# Patient Record
Sex: FEMALE | Race: WHITE | NOT HISPANIC OR LATINO | Employment: OTHER | URBAN - METROPOLITAN AREA
[De-identification: names, ages, dates, MRNs, and addresses within clinical notes are randomized per-mention and may not be internally consistent; named-entity substitution may affect disease eponyms.]

---

## 2017-02-12 ENCOUNTER — APPOINTMENT (OUTPATIENT)
Dept: PREADMISSION TESTING | Facility: HOSPITAL | Age: 67
End: 2017-02-12

## 2017-02-12 ENCOUNTER — HOSPITAL ENCOUNTER (OUTPATIENT)
Dept: GENERAL RADIOLOGY | Facility: HOSPITAL | Age: 67
Discharge: HOME | End: 2017-02-12
Admitting: ORTHOPAEDIC SURGERY

## 2017-02-12 VITALS — HEIGHT: 64 IN | WEIGHT: 195.99 LBS | BODY MASS INDEX: 33.46 KG/M2

## 2017-02-12 LAB
ANION GAP SERPL CALCULATED.3IONS-SCNC: 4 MMOL/L (ref 3–11)
BUN BLD-MCNC: 8 MG/DL (ref 9–23)
BUN/CREAT SERPL: 8 (ref 7–25)
CALCIUM SPEC-SCNC: 9.5 MG/DL (ref 8.7–10.4)
CHLORIDE SERPL-SCNC: 101 MMOL/L (ref 99–109)
CO2 SERPL-SCNC: 33 MMOL/L (ref 20–31)
CREAT BLD-MCNC: 1 MG/DL (ref 0.6–1.3)
DEPRECATED RDW RBC AUTO: 46.6 FL (ref 37–54)
ERYTHROCYTE [DISTWIDTH] IN BLOOD BY AUTOMATED COUNT: 13.5 % (ref 11.3–14.5)
GFR SERPL CREATININE-BSD FRML MDRD: 55 ML/MIN/1.73
GLUCOSE BLD-MCNC: 85 MG/DL (ref 70–100)
HBA1C MFR BLD: 5.8 % (ref 4.8–5.6)
HCT VFR BLD AUTO: 34.4 % (ref 34.5–44)
HGB BLD-MCNC: 11.6 G/DL (ref 11.5–15.5)
MCH RBC QN AUTO: 31.4 PG (ref 27–31)
MCHC RBC AUTO-ENTMCNC: 33.7 G/DL (ref 32–36)
MCV RBC AUTO: 93.2 FL (ref 80–99)
PLATELET # BLD AUTO: 319 10*3/MM3 (ref 150–450)
PMV BLD AUTO: 8.3 FL (ref 6–12)
POTASSIUM BLD-SCNC: 3.8 MMOL/L (ref 3.5–5.5)
RBC # BLD AUTO: 3.69 10*6/MM3 (ref 3.89–5.14)
SODIUM BLD-SCNC: 138 MMOL/L (ref 132–146)
WBC NRBC COR # BLD: 5.88 10*3/MM3 (ref 3.5–10.8)

## 2017-02-12 PROCEDURE — 93010 ELECTROCARDIOGRAM REPORT: CPT | Performed by: INTERNAL MEDICINE

## 2017-02-12 RX ORDER — INDAPAMIDE 1.25 MG/1
1.25 TABLET, FILM COATED ORAL EVERY MORNING
Status: ON HOLD | COMMUNITY
End: 2021-10-14

## 2017-02-12 RX ORDER — DIPHENOXYLATE HYDROCHLORIDE AND ATROPINE SULFATE 2.5; .025 MG/1; MG/1
1 TABLET ORAL 4 TIMES DAILY PRN
COMMUNITY

## 2017-02-12 RX ORDER — RAMELTEON 8 MG/1
8 TABLET ORAL NIGHTLY
Status: ON HOLD | COMMUNITY
End: 2021-10-14

## 2017-02-12 RX ORDER — FESOTERODINE FUMARATE 8 MG/1
8 TABLET, EXTENDED RELEASE ORAL
COMMUNITY

## 2017-02-12 RX ORDER — PREGABALIN 150 MG/1
300 CAPSULE ORAL EVERY MORNING
Status: ON HOLD | COMMUNITY
End: 2022-04-11 | Stop reason: SDUPTHER

## 2017-02-12 RX ORDER — POTASSIUM CHLORIDE 750 MG/1
10 TABLET, FILM COATED, EXTENDED RELEASE ORAL 2 TIMES DAILY
COMMUNITY
End: 2021-09-27

## 2017-02-12 RX ORDER — LISINOPRIL 20 MG/1
20 TABLET ORAL 2 TIMES DAILY
COMMUNITY

## 2017-02-12 RX ORDER — AMLODIPINE BESYLATE 5 MG/1
5 TABLET ORAL DAILY
COMMUNITY
End: 2018-01-09 | Stop reason: ALTCHOICE

## 2017-02-12 RX ORDER — HYDROCODONE BITARTRATE AND ACETAMINOPHEN 5; 300 MG/1; MG/1
1 TABLET ORAL EVERY 6 HOURS PRN
COMMUNITY
End: 2017-02-14 | Stop reason: HOSPADM

## 2017-02-12 RX ORDER — ZOLMITRIPTAN 5 MG/1
5 TABLET, FILM COATED ORAL ONCE AS NEEDED
Status: ON HOLD | COMMUNITY
End: 2022-10-06

## 2017-02-12 RX ORDER — VILAZODONE HYDROCHLORIDE 40 MG/1
40 TABLET ORAL DAILY
COMMUNITY

## 2017-02-12 RX ORDER — CHLORDIAZEPOXIDE HYDROCHLORIDE 10 MG/1
10 CAPSULE, GELATIN COATED ORAL 3 TIMES DAILY PRN
Status: ON HOLD | COMMUNITY
End: 2021-10-14

## 2017-02-13 ENCOUNTER — ANESTHESIA (OUTPATIENT)
Dept: PERIOP | Facility: HOSPITAL | Age: 67
End: 2017-02-13

## 2017-02-13 ENCOUNTER — HOSPITAL ENCOUNTER (INPATIENT)
Facility: HOSPITAL | Age: 67
LOS: 1 days | Discharge: HOME HEALTH | End: 2017-02-14
Attending: ORTHOPAEDIC SURGERY | Admitting: ORTHOPAEDIC SURGERY

## 2017-02-13 ENCOUNTER — ANESTHESIA EVENT (OUTPATIENT)
Dept: PERIOP | Facility: HOSPITAL | Age: 67
End: 2017-02-13

## 2017-02-13 ENCOUNTER — APPOINTMENT (OUTPATIENT)
Dept: GENERAL RADIOLOGY | Facility: HOSPITAL | Age: 67
End: 2017-02-13

## 2017-02-13 DIAGNOSIS — Z96.612 STATUS POST TOTAL REPLACEMENT OF LEFT SHOULDER: Primary | ICD-10-CM

## 2017-02-13 PROBLEM — I10 HYPERTENSION: Chronic | Status: ACTIVE | Noted: 2017-02-13

## 2017-02-13 PROBLEM — M19.019 OSTEOARTHROSIS, LOCALIZED, PRIMARY, SHOULDER REGION: Status: ACTIVE | Noted: 2017-02-13

## 2017-02-13 PROBLEM — F32.A DEPRESSED: Chronic | Status: ACTIVE | Noted: 2017-02-13

## 2017-02-13 PROCEDURE — 25010000002 VANCOMYCIN HCL IN NACL 1.25-0.9 GM/250ML-% SOLUTION: Performed by: ORTHOPAEDIC SURGERY

## 2017-02-13 PROCEDURE — 25010000002 NEOSTIGMINE PER 0.5 MG: Performed by: NURSE ANESTHETIST, CERTIFIED REGISTERED

## 2017-02-13 PROCEDURE — 25010000002 MIDAZOLAM PER 1 MG: Performed by: ANESTHESIOLOGY

## 2017-02-13 PROCEDURE — 0LS40ZZ REPOSITION LEFT UPPER ARM TENDON, OPEN APPROACH: ICD-10-PCS | Performed by: ORTHOPAEDIC SURGERY

## 2017-02-13 PROCEDURE — C1713 ANCHOR/SCREW BN/BN,TIS/BN: HCPCS | Performed by: ORTHOPAEDIC SURGERY

## 2017-02-13 PROCEDURE — 25010000002 DEXAMETHASONE PER 1 MG: Performed by: NURSE ANESTHETIST, CERTIFIED REGISTERED

## 2017-02-13 PROCEDURE — 73030 X-RAY EXAM OF SHOULDER: CPT

## 2017-02-13 PROCEDURE — 25010000002 HYDROMORPHONE PER 4 MG: Performed by: ORTHOPAEDIC SURGERY

## 2017-02-13 PROCEDURE — 0RRK0JZ REPLACEMENT OF LEFT SHOULDER JOINT WITH SYNTHETIC SUBSTITUTE, OPEN APPROACH: ICD-10-PCS | Performed by: ORTHOPAEDIC SURGERY

## 2017-02-13 PROCEDURE — 97166 OT EVAL MOD COMPLEX 45 MIN: CPT

## 2017-02-13 PROCEDURE — 25010000002 FENTANYL CITRATE (PF) 100 MCG/2ML SOLUTION: Performed by: NURSE ANESTHETIST, CERTIFIED REGISTERED

## 2017-02-13 PROCEDURE — 94799 UNLISTED PULMONARY SVC/PX: CPT

## 2017-02-13 PROCEDURE — 99221 1ST HOSP IP/OBS SF/LOW 40: CPT | Performed by: NURSE PRACTITIONER

## 2017-02-13 PROCEDURE — G0378 HOSPITAL OBSERVATION PER HR: HCPCS

## 2017-02-13 PROCEDURE — 25010000002 ONDANSETRON PER 1 MG: Performed by: NURSE ANESTHETIST, CERTIFIED REGISTERED

## 2017-02-13 PROCEDURE — C1776 JOINT DEVICE (IMPLANTABLE): HCPCS | Performed by: ORTHOPAEDIC SURGERY

## 2017-02-13 PROCEDURE — 97530 THERAPEUTIC ACTIVITIES: CPT

## 2017-02-13 PROCEDURE — 25010000002 PHENYLEPHRINE PER 1 ML: Performed by: NURSE ANESTHETIST, CERTIFIED REGISTERED

## 2017-02-13 PROCEDURE — 25010000003 CEFAZOLIN IN DEXTROSE 2-4 GM/100ML-% SOLUTION: Performed by: ORTHOPAEDIC SURGERY

## 2017-02-13 PROCEDURE — 25010000002 PROPOFOL 10 MG/ML EMULSION: Performed by: NURSE ANESTHETIST, CERTIFIED REGISTERED

## 2017-02-13 PROCEDURE — G8988 SELF CARE GOAL STATUS: HCPCS | Performed by: OCCUPATIONAL THERAPIST

## 2017-02-13 PROCEDURE — 25010000002 ROPIVACAINE PER 1 MG: Performed by: NURSE ANESTHETIST, CERTIFIED REGISTERED

## 2017-02-13 PROCEDURE — G8987 SELF CARE CURRENT STATUS: HCPCS | Performed by: OCCUPATIONAL THERAPIST

## 2017-02-13 DEVICE — TOTL SHLDR ARTHROPLASTY SYS: Type: IMPLANTABLE DEVICE | Site: SHOULDER | Status: FUNCTIONAL

## 2017-02-13 DEVICE — IMPLANTABLE DEVICE: Type: IMPLANTABLE DEVICE | Site: SHOULDER | Status: FUNCTIONAL

## 2017-02-13 DEVICE — HD HUM EQUINOXE SHT 44MM: Type: IMPLANTABLE DEVICE | Site: SHOULDER | Status: FUNCTIONAL

## 2017-02-13 DEVICE — SMARTSET HIGH PERFORMANCE MV MEDIUM VISCOSITY BONE CEMENT 40G
Type: IMPLANTABLE DEVICE | Site: SHOULDER | Status: FUNCTIONAL
Brand: SMARTSET

## 2017-02-13 DEVICE — STEM HUM PRI EQUINOXE PRESSFIT 13MM: Type: IMPLANTABLE DEVICE | Site: SHOULDER | Status: FUNCTIONAL

## 2017-02-13 DEVICE — PLT HUM EQUINOXE REPLICAT OFFST 1.5: Type: IMPLANTABLE DEVICE | Site: SHOULDER | Status: FUNCTIONAL

## 2017-02-13 DEVICE — SCRW EQUINOXE TORQ DEFINE KT SQ: Type: IMPLANTABLE DEVICE | Site: SHOULDER | Status: FUNCTIONAL

## 2017-02-13 RX ORDER — SODIUM CHLORIDE 450 MG/100ML
50 INJECTION, SOLUTION INTRAVENOUS CONTINUOUS
Status: DISCONTINUED | OUTPATIENT
Start: 2017-02-13 | End: 2017-02-14 | Stop reason: HOSPADM

## 2017-02-13 RX ORDER — VANCOMYCIN HYDROCHLORIDE
15 ONCE
Status: COMPLETED | OUTPATIENT
Start: 2017-02-13 | End: 2017-02-13

## 2017-02-13 RX ORDER — ATRACURIUM BESYLATE 10 MG/ML
INJECTION, SOLUTION INTRAVENOUS AS NEEDED
Status: DISCONTINUED | OUTPATIENT
Start: 2017-02-13 | End: 2017-02-13 | Stop reason: SURG

## 2017-02-13 RX ORDER — DEXAMETHASONE SODIUM PHOSPHATE 4 MG/ML
INJECTION, SOLUTION INTRA-ARTICULAR; INTRALESIONAL; INTRAMUSCULAR; INTRAVENOUS; SOFT TISSUE AS NEEDED
Status: DISCONTINUED | OUTPATIENT
Start: 2017-02-13 | End: 2017-02-13 | Stop reason: SURG

## 2017-02-13 RX ORDER — LIDOCAINE HYDROCHLORIDE 10 MG/ML
INJECTION, SOLUTION INFILTRATION; PERINEURAL AS NEEDED
Status: DISCONTINUED | OUTPATIENT
Start: 2017-02-13 | End: 2017-02-13 | Stop reason: SURG

## 2017-02-13 RX ORDER — SODIUM CHLORIDE 0.9 % (FLUSH) 0.9 %
1-10 SYRINGE (ML) INJECTION AS NEEDED
Status: DISCONTINUED | OUTPATIENT
Start: 2017-02-13 | End: 2017-02-13 | Stop reason: HOSPADM

## 2017-02-13 RX ORDER — MAGNESIUM HYDROXIDE 1200 MG/15ML
LIQUID ORAL AS NEEDED
Status: DISCONTINUED | OUTPATIENT
Start: 2017-02-13 | End: 2017-02-13 | Stop reason: HOSPADM

## 2017-02-13 RX ORDER — NALOXONE HCL 0.4 MG/ML
0.1 VIAL (ML) INJECTION
Status: DISCONTINUED | OUTPATIENT
Start: 2017-02-13 | End: 2017-02-14 | Stop reason: HOSPADM

## 2017-02-13 RX ORDER — PROMETHAZINE HYDROCHLORIDE 25 MG/ML
6.25 INJECTION, SOLUTION INTRAMUSCULAR; INTRAVENOUS ONCE AS NEEDED
Status: DISCONTINUED | OUTPATIENT
Start: 2017-02-13 | End: 2017-02-13 | Stop reason: HOSPADM

## 2017-02-13 RX ORDER — SODIUM CHLORIDE, SODIUM LACTATE, POTASSIUM CHLORIDE, CALCIUM CHLORIDE 600; 310; 30; 20 MG/100ML; MG/100ML; MG/100ML; MG/100ML
9 INJECTION, SOLUTION INTRAVENOUS CONTINUOUS PRN
Status: DISCONTINUED | OUTPATIENT
Start: 2017-02-13 | End: 2017-02-13 | Stop reason: HOSPADM

## 2017-02-13 RX ORDER — ACETAMINOPHEN 325 MG/1
650 TABLET ORAL ONCE AS NEEDED
Status: COMPLETED | OUTPATIENT
Start: 2017-02-13 | End: 2017-02-13

## 2017-02-13 RX ORDER — PROPOFOL 10 MG/ML
VIAL (ML) INTRAVENOUS CONTINUOUS PRN
Status: DISCONTINUED | OUTPATIENT
Start: 2017-02-13 | End: 2017-02-13 | Stop reason: SURG

## 2017-02-13 RX ORDER — ESMOLOL HYDROCHLORIDE 10 MG/ML
INJECTION INTRAVENOUS AS NEEDED
Status: DISCONTINUED | OUTPATIENT
Start: 2017-02-13 | End: 2017-02-13 | Stop reason: SURG

## 2017-02-13 RX ORDER — ONDANSETRON 2 MG/ML
INJECTION INTRAMUSCULAR; INTRAVENOUS AS NEEDED
Status: DISCONTINUED | OUTPATIENT
Start: 2017-02-13 | End: 2017-02-13 | Stop reason: SURG

## 2017-02-13 RX ORDER — ROPIVACAINE HYDROCHLORIDE 2 MG/ML
4 INJECTION, SOLUTION EPIDURAL; INFILTRATION CONTINUOUS
Status: DISCONTINUED | OUTPATIENT
Start: 2017-02-13 | End: 2017-02-13

## 2017-02-13 RX ORDER — SENNA AND DOCUSATE SODIUM 50; 8.6 MG/1; MG/1
2 TABLET, FILM COATED ORAL 2 TIMES DAILY
Status: DISCONTINUED | OUTPATIENT
Start: 2017-02-13 | End: 2017-02-14 | Stop reason: HOSPADM

## 2017-02-13 RX ORDER — PREGABALIN 75 MG/1
75 CAPSULE ORAL 2 TIMES DAILY
Status: DISCONTINUED | OUTPATIENT
Start: 2017-02-13 | End: 2017-02-14 | Stop reason: HOSPADM

## 2017-02-13 RX ORDER — FAMOTIDINE 10 MG/ML
20 INJECTION, SOLUTION INTRAVENOUS
Status: DISCONTINUED | OUTPATIENT
Start: 2017-02-13 | End: 2017-02-13 | Stop reason: HOSPADM

## 2017-02-13 RX ORDER — CHLORDIAZEPOXIDE HYDROCHLORIDE 5 MG/1
10 CAPSULE, GELATIN COATED ORAL 3 TIMES DAILY PRN
Status: DISCONTINUED | OUTPATIENT
Start: 2017-02-13 | End: 2017-02-14 | Stop reason: HOSPADM

## 2017-02-13 RX ORDER — VANCOMYCIN HYDROCHLORIDE
15 ONCE
Status: COMPLETED | OUTPATIENT
Start: 2017-02-13 | End: 2017-02-14

## 2017-02-13 RX ORDER — OXYBUTYNIN CHLORIDE 5 MG/1
10 TABLET, EXTENDED RELEASE ORAL DAILY
Status: DISCONTINUED | OUTPATIENT
Start: 2017-02-14 | End: 2017-02-14 | Stop reason: HOSPADM

## 2017-02-13 RX ORDER — PROPOFOL 10 MG/ML
VIAL (ML) INTRAVENOUS AS NEEDED
Status: DISCONTINUED | OUTPATIENT
Start: 2017-02-13 | End: 2017-02-13 | Stop reason: SURG

## 2017-02-13 RX ORDER — ACETAMINOPHEN 650 MG/1
650 SUPPOSITORY RECTAL ONCE AS NEEDED
Status: COMPLETED | OUTPATIENT
Start: 2017-02-13 | End: 2017-02-13

## 2017-02-13 RX ORDER — HYDROCODONE BITARTRATE AND ACETAMINOPHEN 10; 325 MG/1; MG/1
1 TABLET ORAL EVERY 4 HOURS PRN
Status: DISCONTINUED | OUTPATIENT
Start: 2017-02-13 | End: 2017-02-14 | Stop reason: HOSPADM

## 2017-02-13 RX ORDER — OXYCODONE HYDROCHLORIDE AND ACETAMINOPHEN 5; 325 MG/1; MG/1
1 TABLET ORAL ONCE AS NEEDED
Status: COMPLETED | OUTPATIENT
Start: 2017-02-13 | End: 2017-02-13

## 2017-02-13 RX ORDER — CEFAZOLIN SODIUM 2 G/100ML
2 INJECTION, SOLUTION INTRAVENOUS ONCE
Status: COMPLETED | OUTPATIENT
Start: 2017-02-13 | End: 2017-02-13

## 2017-02-13 RX ORDER — PROMETHAZINE HYDROCHLORIDE 25 MG/1
25 SUPPOSITORY RECTAL ONCE AS NEEDED
Status: DISCONTINUED | OUTPATIENT
Start: 2017-02-13 | End: 2017-02-13 | Stop reason: HOSPADM

## 2017-02-13 RX ORDER — LIDOCAINE HYDROCHLORIDE 10 MG/ML
0.5 INJECTION, SOLUTION EPIDURAL; INFILTRATION; INTRACAUDAL; PERINEURAL ONCE AS NEEDED
Status: COMPLETED | OUTPATIENT
Start: 2017-02-13 | End: 2017-02-13

## 2017-02-13 RX ORDER — MIDAZOLAM HYDROCHLORIDE 1 MG/ML
INJECTION INTRAMUSCULAR; INTRAVENOUS AS NEEDED
Status: DISCONTINUED | OUTPATIENT
Start: 2017-02-13 | End: 2017-02-13 | Stop reason: SURG

## 2017-02-13 RX ORDER — ROPIVACAINE HYDROCHLORIDE 2 MG/ML
6 INJECTION, SOLUTION EPIDURAL; INFILTRATION CONTINUOUS
Status: DISCONTINUED | OUTPATIENT
Start: 2017-02-13 | End: 2017-02-14 | Stop reason: HOSPADM

## 2017-02-13 RX ORDER — FENTANYL CITRATE 50 UG/ML
50 INJECTION, SOLUTION INTRAMUSCULAR; INTRAVENOUS
Status: DISCONTINUED | OUTPATIENT
Start: 2017-02-13 | End: 2017-02-13 | Stop reason: HOSPADM

## 2017-02-13 RX ORDER — BUPIVACAINE HYDROCHLORIDE 2.5 MG/ML
INJECTION, SOLUTION EPIDURAL; INFILTRATION; INTRACAUDAL AS NEEDED
Status: DISCONTINUED | OUTPATIENT
Start: 2017-02-13 | End: 2017-02-13 | Stop reason: SURG

## 2017-02-13 RX ORDER — DOCUSATE SODIUM 100 MG/1
100 CAPSULE, LIQUID FILLED ORAL 2 TIMES DAILY PRN
Status: DISCONTINUED | OUTPATIENT
Start: 2017-02-13 | End: 2017-02-14 | Stop reason: HOSPADM

## 2017-02-13 RX ORDER — OXYCODONE HYDROCHLORIDE AND ACETAMINOPHEN 5; 325 MG/1; MG/1
1 TABLET ORAL EVERY 4 HOURS PRN
Status: DISCONTINUED | OUTPATIENT
Start: 2017-02-13 | End: 2017-02-14 | Stop reason: HOSPADM

## 2017-02-13 RX ORDER — FAMOTIDINE 20 MG/1
20 TABLET, FILM COATED ORAL
Status: DISCONTINUED | OUTPATIENT
Start: 2017-02-13 | End: 2017-02-13 | Stop reason: HOSPADM

## 2017-02-13 RX ORDER — PROMETHAZINE HYDROCHLORIDE 25 MG/1
25 TABLET ORAL ONCE AS NEEDED
Status: DISCONTINUED | OUTPATIENT
Start: 2017-02-13 | End: 2017-02-13 | Stop reason: HOSPADM

## 2017-02-13 RX ORDER — GLYCOPYRROLATE 0.2 MG/ML
INJECTION INTRAMUSCULAR; INTRAVENOUS AS NEEDED
Status: DISCONTINUED | OUTPATIENT
Start: 2017-02-13 | End: 2017-02-13 | Stop reason: SURG

## 2017-02-13 RX ADMIN — OXYCODONE AND ACETAMINOPHEN 1 TABLET: 5; 325 TABLET ORAL at 21:40

## 2017-02-13 RX ADMIN — PHENYLEPHRINE HYDROCHLORIDE 100 MCG: 10 INJECTION INTRAVENOUS at 11:15

## 2017-02-13 RX ADMIN — SODIUM CHLORIDE 50 ML/HR: 4.5 INJECTION, SOLUTION INTRAVENOUS at 14:15

## 2017-02-13 RX ADMIN — Medication 3 MG: at 11:51

## 2017-02-13 RX ADMIN — LIDOCAINE HYDROCHLORIDE 50 MG: 10 INJECTION, SOLUTION INFILTRATION; PERINEURAL at 10:28

## 2017-02-13 RX ADMIN — OXYCODONE AND ACETAMINOPHEN 1 TABLET: 5; 325 TABLET ORAL at 12:50

## 2017-02-13 RX ADMIN — EPHEDRINE SULFATE 10 MG: 50 INJECTION INTRAMUSCULAR; INTRAVENOUS; SUBCUTANEOUS at 11:15

## 2017-02-13 RX ADMIN — EPHEDRINE SULFATE 10 MG: 50 INJECTION INTRAMUSCULAR; INTRAVENOUS; SUBCUTANEOUS at 11:10

## 2017-02-13 RX ADMIN — PREGABALIN 75 MG: 75 CAPSULE ORAL at 17:32

## 2017-02-13 RX ADMIN — EPHEDRINE SULFATE 10 MG: 50 INJECTION INTRAMUSCULAR; INTRAVENOUS; SUBCUTANEOUS at 10:35

## 2017-02-13 RX ADMIN — CEFAZOLIN SODIUM 2 G: 2 INJECTION, SOLUTION INTRAVENOUS at 09:25

## 2017-02-13 RX ADMIN — DOCUSATE SODIUM AND SENNOSIDES 2 TABLET: 8.6; 5 TABLET, FILM COATED ORAL at 17:31

## 2017-02-13 RX ADMIN — DEXAMETHASONE SODIUM PHOSPHATE 8 MG: 4 INJECTION, SOLUTION INTRAMUSCULAR; INTRAVENOUS at 10:28

## 2017-02-13 RX ADMIN — LIDOCAINE HYDROCHLORIDE 0.2 ML: 10 INJECTION, SOLUTION EPIDURAL; INFILTRATION; INTRACAUDAL; PERINEURAL at 08:01

## 2017-02-13 RX ADMIN — ESMOLOL HYDROCHLORIDE 30 MG: 10 INJECTION, SOLUTION INTRAVENOUS at 10:28

## 2017-02-13 RX ADMIN — HYDROMORPHONE HYDROCHLORIDE 0.5 MG: 1 INJECTION, SOLUTION INTRAMUSCULAR; INTRAVENOUS; SUBCUTANEOUS at 14:33

## 2017-02-13 RX ADMIN — ACETAMINOPHEN 650 MG: 325 TABLET ORAL at 12:50

## 2017-02-13 RX ADMIN — PROPOFOL 25 MCG/KG/MIN: 10 INJECTION, EMULSION INTRAVENOUS at 10:28

## 2017-02-13 RX ADMIN — SODIUM CHLORIDE, POTASSIUM CHLORIDE, SODIUM LACTATE AND CALCIUM CHLORIDE 9 ML/HR: 600; 310; 30; 20 INJECTION, SOLUTION INTRAVENOUS at 08:01

## 2017-02-13 RX ADMIN — BUPIVACAINE HYDROCHLORIDE 20 ML: 2.5 INJECTION, SOLUTION EPIDURAL; INFILTRATION; INTRACAUDAL; PERINEURAL at 08:30

## 2017-02-13 RX ADMIN — FENTANYL CITRATE 50 MCG: 50 INJECTION, SOLUTION INTRAMUSCULAR; INTRAVENOUS at 12:20

## 2017-02-13 RX ADMIN — VANCOMYCIN HYDROCHLORIDE 1250 MG: 1 INJECTION, POWDER, LYOPHILIZED, FOR SOLUTION INTRAVENOUS at 23:49

## 2017-02-13 RX ADMIN — OXYCODONE AND ACETAMINOPHEN 1 TABLET: 5; 325 TABLET ORAL at 17:32

## 2017-02-13 RX ADMIN — EPHEDRINE SULFATE 10 MG: 50 INJECTION INTRAMUSCULAR; INTRAVENOUS; SUBCUTANEOUS at 10:40

## 2017-02-13 RX ADMIN — HYDROMORPHONE HYDROCHLORIDE 0.5 MG: 1 INJECTION, SOLUTION INTRAMUSCULAR; INTRAVENOUS; SUBCUTANEOUS at 23:48

## 2017-02-13 RX ADMIN — Medication 6 ML/HR: at 12:12

## 2017-02-13 RX ADMIN — VANCOMYCIN HYDROCHLORIDE 1250 MG: 1 INJECTION, POWDER, LYOPHILIZED, FOR SOLUTION INTRAVENOUS at 10:26

## 2017-02-13 RX ADMIN — ROBINUL 0.4 MG: 0.2 INJECTION INTRAMUSCULAR; INTRAVENOUS at 11:51

## 2017-02-13 RX ADMIN — PROPOFOL 150 MG: 10 INJECTION, EMULSION INTRAVENOUS at 10:28

## 2017-02-13 RX ADMIN — HYDROMORPHONE HYDROCHLORIDE 0.5 MG: 1 INJECTION, SOLUTION INTRAMUSCULAR; INTRAVENOUS; SUBCUTANEOUS at 19:44

## 2017-02-13 RX ADMIN — FENTANYL CITRATE 50 MCG: 50 INJECTION, SOLUTION INTRAMUSCULAR; INTRAVENOUS at 12:30

## 2017-02-13 RX ADMIN — ONDANSETRON 4 MG: 2 INJECTION INTRAMUSCULAR; INTRAVENOUS at 11:40

## 2017-02-13 RX ADMIN — ATRACURIUM BESYLATE 30 MG: 10 INJECTION, SOLUTION INTRAVENOUS at 10:28

## 2017-02-13 RX ADMIN — MIDAZOLAM HYDROCHLORIDE 2 MG: 1 INJECTION, SOLUTION INTRAMUSCULAR; INTRAVENOUS at 08:25

## 2017-02-13 RX ADMIN — FAMOTIDINE 20 MG: 20 TABLET ORAL at 08:02

## 2017-02-13 NOTE — NURSING NOTE
Acute Pain Service:  On-Q teaching completed with patient's sister Tiffanie Gonzalez.  Video demonstration, handout and bracelet provided with [facility] on call central phone number.  Instructed to call with any questions or concerns.  Patient verbalized understanding.  Service will continue to follow until catheter DC'd.  Please contact Tiffanie at [phone] if needed.

## 2017-02-13 NOTE — PLAN OF CARE
Problem: Patient Care Overview (Adult)  Goal: Plan of Care Review  Outcome: Ongoing (interventions implemented as appropriate)    02/13/17 3840   Coping/Psychosocial Response Interventions   Plan Of Care Reviewed With patient   Patient Care Overview   Progress improving   Outcome Evaluation   Outcome Summary/Follow up Plan Patient ambulating and in halls with therapy and pain is better.       Goal: Adult Individualization and Mutuality  Outcome: Ongoing (interventions implemented as appropriate)  Goal: Discharge Needs Assessment  Outcome: Ongoing (interventions implemented as appropriate)    Problem: Perioperative Period (Adult)  Goal: Signs and Symptoms of Listed Potential Problems Will be Absent or Manageable (Perioperative Period)  Outcome: Ongoing (interventions implemented as appropriate)    Problem: Fall Risk (Adult)  Goal: Identify Related Risk Factors and Signs and Symptoms  Outcome: Ongoing (interventions implemented as appropriate)  Goal: Absence of Falls  Outcome: Ongoing (interventions implemented as appropriate)

## 2017-02-13 NOTE — ANESTHESIA PREPROCEDURE EVALUATION
Anesthesia Evaluation     Patient summary reviewed and Nursing notes reviewed   history of anesthetic complications: PONV     Airway   Mallampati: III  TM distance: >3 FB  Neck ROM: full  possible difficult intubation  Dental    (+) implants    Pulmonary    (+) sleep apnea, decreased breath sounds,   Cardiovascular - normal exam  Exercise tolerance: good (4-7 METS)    ECG reviewed  Rhythm: regular  Rate: normal    (+) hypertension well controlled,       Neuro/Psych  (+) headaches, psychiatric history Depression,    GI/Hepatic/Renal/Endo    (+)  GERD well controlled,     Musculoskeletal     Abdominal   (+) obese,     Abdomen: soft.   Substance History      OB/GYN          Other   (+) arthritis                             MILD ANEMIA    Anesthesia Plan    ASA 2     general and regional     intravenous induction   Anesthetic plan and risks discussed with patient.    Plan discussed with CRNA.

## 2017-02-13 NOTE — BRIEF OP NOTE
TOTAL SHOULDER REVISION  Procedure Note    Jaylyn Fuentes  2/13/2017    Pre-op Diagnosis:   * No pre-op diagnosis entered *    Post-op Diagnosis:     Post-Op Diagnosis Codes:     * Primary localized osteoarthrosis of shoulder region, left [M19.012]     * Left bicipital tenosynovitis [M75.22]    Procedure/CPT® Codes:  OH RECONSTR TOTAL SHOULDER IMPLANT [80942]  OH REPAIR BICEPS LONG TENDON [67162]    Procedure(s):  TOTAL SHOULDER REPLACEMENT LEFT    Surgeon(s):  MD Freeman Robertson MD, Sports Fellow    Anesthesia: General with Block    Staff:   Circulator: Alexandra Mary RN; Sonia Cordon RN  Scrub Person: Carmine Marley; Rosanne Cerda  Vendor Representative: Luis Sweeney  Nursing Assistant: Elisabeth Washington  Assistant: WAYLON Lancaster    Estimated Blood Loss: 75 mL    Specimens:                * No specimens in log *      Drains:           Findings: per dictation    Complications: none      Benigno Chavez MD     Date: 2/13/2017  Time: 11:58 AM

## 2017-02-13 NOTE — PROGRESS NOTES
Acute Care - Occupational Therapy Initial Evaluation  [facility]     Patient Name: Jaylyn Fuentes  : 1950  MRN: [id number]  Today's Date: 2017  Onset of Illness/Injury or Date of Surgery Date: (P) 17  Date of Referral to OT: (P) 17  Referring Physician: (VANNESA) MD Scott    Admit Date: 2017     No diagnosis found.  Patient Active Problem List   Diagnosis   • Osteoarthrosis, localized, primary, shoulder region   • Hypertension   • Depressed   • Status post reverse total arthroplasty of left shoulder     Past Medical History   Diagnosis Date   • Arthritis    • Depressed    • GERD (gastroesophageal reflux disease)    • History of transfusion    • Hypertension    • Migraine    • PONV (postoperative nausea and vomiting)      SCOPOLAMINE HELPS-USED DURING LAST SURGERY    • Urinary incontinence    • Wears contact lenses    • Wears dentures      UPPER AND LOWER    • Wears hearing aid      DOESN'T HAVE WITH HER     Past Surgical History   Procedure Laterality Date   • Cholecystectomy     • Hysterectomy     • Joint replacement       BILATERAL KNEES, BILATERAL HIPS   • Back surgery       LUMBAR FUSION    • Cervical fusion     • Colonoscopy       2 YEARS AGO    • Shoulder ligament repair Right    • Tonsillectomy     • Eye surgery       LASER FOR KERITONOSIS           OT ASSESSMENT FLOWSHEET (last 72 hours)      OT Evaluation       17 1800 17 1600 17 1531 17 1341 17 0744    Rehab Evaluation    Document Type   (P)  evaluation  -HK      Subjective Information   (P)  agree to therapy;no complaints  -HK      Patient Effort, Rehab Treatment   (P)  good  -HK      Symptoms Noted During/After Treatment   (P)  other (see comments)   lethargic  -HK      General Information    Patient Profile Review   (P)  yes  -HK      Onset of Illness/Injury or Date of Surgery Date   (P)  17  -HK      Referring Physician   (P)  MD Scott  -HK      General Observations   (P)  Pt supine  in bed with hospitalist at bedside.   -HK      Pertinent History Of Current Problem   (P)  Pt is a 66 YOF admitted for surgical management for progressive shoulder pain that failed to improve w/ conservative measures. POD #0 L TSR. OT orders indicate PROM forward elevation no limit, IR to chest, ER 30 degrees and AROM elbow, wrist, hand, and scapular retraction. Prior to surgery pt reports having difficulty driving, drying her hair, and reaching in cabinets.   -HK      Precautions/Limitations   (P)  fall precautions;shoulder precautions;non-weight bearing status;insensate limb   interscalene nerve cath  -HK      Prior Level of Function   (P)  independent:;all household mobility;community mobility;min assist:;ADL's;home management  -HK      Equipment Currently Used at Home   (P)  none  -HK  none  -PC    Plans/Goals Discussed With   (P)  patient and family;agreed upon  -HK      Risks Reviewed   (P)  patient and family:;LOB;nausea/vomiting;dizziness;increased discomfort;change in vital signs  -HK      Benefits Reviewed   (P)  patient and family:;improve function;increase independence;increase strength;increase balance;decrease pain;decrease risk of DVT;increase knowledge  -HK      Barriers to Rehab   (P)  none identified  -HK      Living Environment    Lives With   (P)  alone  -HK  alone  -PC    Living Arrangements   (P)  house  -HK  house  -PC    Home Accessibility   (P)  bed and bath are not on the first floor   Pt reports that kitchen, bathroom, bedroom are on 2nd floor  -HK  no concerns  -PC    Stair Railings at Home   (P)  other (see comments)   pt reports brother is currently installing railings  -HK      Type of Financial/Environmental Concern   (P)  none  -HK  none  -PC    Transportation Available   (P)  car  -HK  car  -PC    Living Environment Comment   (P)  Pt lives alone, sister will be staying with her 1 night to assist. Pt unsure of discharge disposition at present  -HK      Clinical Impression    Date of  Referral to OT   (P)  02/13/17  -HK      OT Diagnosis   (P)  Decreased independence w/ ADLs and mobility.   -HK      Patient/Family Goals Statement   (P)  Patient wants to return home   -HK      Criteria for Skilled Therapeutic Interventions Met   (P)  yes;treatment indicated  -HK      Rehab Potential   (P)  good, to achieve stated therapy goals  -HK      Therapy Frequency   (P)  daily   per prioriy policy  -HK      Anticipated Discharge Disposition   (P)  home with assist   If sister is able to provide ongoing care  -HK      Functional Level Prior    Ambulation     0-->independent  -PC    Transferring     0-->independent  -PC    Toileting     0-->independent  -PC    Bathing     0-->independent  -PC    Dressing     0-->independent  -PC    Eating     0-->independent  -PC    Communication     0-->understands/communicates without difficulty  -PC    Swallowing     0-->swallows foods/liquids without difficulty  -PC    Vital Signs    Pre SpO2 (%)   (P)  94  -HK      O2 Delivery Pre Treatment   (P)  supplemental O2  -HK      Intra SpO2 (%)   (P)  86  -HK      O2 Delivery Intra Treatment   (P)  room air  -HK      Post SpO2 (%)   (P)  94  -HK      O2 Delivery Post Treatment   (P)  supplemental O2  -HK      Pain Assessment    Pain Assessment   (P)  No/denies pain  -HK      Vision Assessment/Intervention    Visual Impairment   (P)  WFL with corrective lenses  -HK      Visual Impairment Comment   (P)  Pt states that she wears contacts   -HK      Cognitive Assessment/Intervention    Current Cognitive/Communication Assessment   (P)  functional  -HK      Orientation Status   (P)  oriented x 4  -HK      Follows Commands/Answers Questions   (P)  able to follow single-step instructions;75% of the time;100% of the time   limited by lethargy  -HK      Personal Safety   (P)  WNL/WFL  -HK      Personal Safety Interventions   (P)  fall prevention program maintained;gait belt;nonskid shoes/slippers when out of bed  -HK      ROM (Range of  Motion)    General ROM   (P)  --  -HK      General ROM Detail   (P)  RUE WFL LUE deferred  -HK      MMT (Manual Muscle Testing)    General MMT Assessment   (P)  --  -HK      General MMT Assessment Detail   (P)  RUE WFL LUE deferred  -HK      Muscle Tone Assessment    LUE Muscle Tone Assessment    moderately decreased tone  -TB     Mobility Assessment/Training    Extremity Weight-Bearing Status   (P)  left upper extremity  -HK      Left Upper Extremity Weight-Bearing   (P)  non weight-bearing  -HK      Bed Mobility, Assessment/Treatment    Bed Mobility, Assistive Device   (P)  overhead trapeze  -HK      Bed Mob, Supine to Sit, [place]   (P)  supervision required  -HK      Bed Mob, Sit to Supine, [place]   (P)  supervision required  -HK      Transfer Assessment/Treatment    Transfers, Sit-Stand [place]   (P)  contact guard assist  -HK      Transfers, Stand-Sit [place]   (P)  contact guard assist  -HK      Transfer, Comment   (P)  Pt was recently medicated with IV pain medication   -HK      Functional Mobility    Functional Mobility- Ind. Level   (P)  contact guard assist  -HK      Functional Mobility-Distance (Feet)   (P)  100  -HK      Upper Body Bathing Assessment/Training    UB Bathing Assess/Train, Comment   (P)  Pt educated on shoulder precautions and left axilla care  -HK      Upper Body Dressing Assessment/Training    UB Dressing Assess/Train, Comment   (P)  Pt. educated on ADL retraining to maintain precautions, sling management including application, wear schedule, and proper fit. Care of ON-Q ball during UB ADLS  -HK      Motor Skills/Interventions    Additional Documentation   (P)  Balance Skills Training (Group)  -HK      Balance Skills Training    Sitting-Level of Assistance   (P)  Close supervision  -HK      Sitting-Balance Support   (P)  Right upper extremity supported;Feet supported  -HK      Standing-Level of Assistance   (P)  Contact guard  -HK      Therapy Exercises    Left Upper  Extremity   (P)  AROM:;10 reps;supine;shoulder protraction/retraction;hand pumps;pronation/supination;AAROM:;elbow flexion/extension;PROM:;shoulder ER/IR;shoulder extension/flexion   issued and reviewed LUE HEP, family not present  -HK      Exercise Protocols   (P)  --   tolerated PROM shoulder , IR to chest ER to 30  -HK      Sensory Assessment/Intervention    Light Touch LUE  -TB LUE  -TB  LUE  -TB     LUE Light Touch mild impairment  -TB mild impairment  -TB  mild impairment  -TB     General Therapy Interventions    ADL Retraining   (P)  Pt educated on shoulder precautions, ADL retraining to maintain, sling management, care of ON-Q ball   -HK      Bed Mobility Training   (P)  educated on maintaining non weight bearing LUE during bed mobility   -HK      Home Exercise Program   (P)  issued and reviewed LUE HEP  -HK      Transfer Training   (P)  reviewed safe transfer training  -HK      Positioning and Restraints    Pre-Treatment Position   (P)  in bed  -HK      Post Treatment Position   (P)  bed  -HK      In Bed   (P)  supine;call light within reach;encouraged to call for assist;exit alarm on;with family/caregiver;SCD pump applied;with brace  -HK        User Key  (r) = Recorded By, (t) = Taken By, (c) = Cosigned By    Initials Name Effective Dates    TB Frandy Donaldson RN 06/16/16 -     PC Jaylyn Caceres RN 06/16/16 -     HK Annalee Collado, OT Student 12/19/16 -            Occupational Therapy Education     Title: PT OT SLP Therapies (Done)     Topic: Occupational Therapy (Done)     Point: ADL training (Done)    Description: Instruct learner(s) on proper safety adaptation and remediation techniques during self care or transfers.   Instruct in proper use of assistive devices.    Learning Progress Summary    Learner Readiness Method Response Comment Documented by Status   Patient Acceptance E,D VU Pt educated on L shoulder precautions, ADL retraining to maintain, sling management, care of ON-Q with ADLS,  NWB LUE, LUE HEP, home safety.  02/13/17 1758 Done               Point: Home exercise program (Done)    Description: Instruct learner(s) on appropriate technique for monitoring, assisting and/or progressing therapeutic exercises/activities.    Learning Progress Summary    Learner Readiness Method Response Comment Documented by Status   Patient Acceptance E,D VU Pt educated on L shoulder precautions, ADL retraining to maintain, sling management, care of ON-Q with ADLS, NWB LUE, LUE HEP, home safety.  02/13/17 1758 Done               Point: Precautions (Done)    Description: Instruct learner(s) on prescribed precautions during self-care and functional transfers.    Learning Progress Summary    Learner Readiness Method Response Comment Documented by Status   Patient Acceptance E,D VU Pt educated on L shoulder precautions, ADL retraining to maintain, sling management, care of ON-Q with ADLS, NWB LUE, LUE HEP, home safety.  02/13/17 1758 Done               Point: Body mechanics (Done)    Description: Instruct learner(s) on proper positioning and spine alignment during self-care, functional mobility activities and/or exercises.    Learning Progress Summary    Learner Readiness Method Response Comment Documented by Status   Patient Acceptance E,D VU Pt educated on L shoulder precautions, ADL retraining to maintain, sling management, care of ON-Q with ADLS, NWB LUE, LUE HEP, home safety.  02/13/17 1758 Done                      User Key     Initials Effective Dates Name Provider Type Discipline     12/19/16 -  Annalee Collado OT Student OT Student OT                  OT Recommendation and Plan  Anticipated Discharge Disposition: (P) home with assist (If sister is able to provide ongoing care)  Therapy Frequency: (P) daily (per prioriy policy)  Plan of Care Review  Plan Of Care Reviewed With: (P) patient, sibling  Progress: (P) improving  Outcome Summary/Follow up Plan: (P) OT evaluation complete and family present  for portion of teaching. Pt had no complaints of pain. She tolerated PROM , IR chest, ER 30. Pt lives alone and is unsure of discharge desire. Due to lethargy from pain meds the pt not candidate for SROM teaching today.           OT Goals       02/13/17 1800          Bed Mobility OT LTG    Bed Mobility OT LTG, Date Established (P)  02/13/17  -HK      Bed Mobility OT LTG, Time to Achieve (P)  by discharge  -HK      Bed Mobility OT LTG, Activity Type (P)  supine to sit/sit to supine  -HK      Bed Mobility OT LTG, [place] Level (P)  supervision required   while maintaining NWB LUE  -HK      Bed Mobility OT LTG, Outcome (P)  goal met  -HK      Transfer Training OT LTG    Transfer Training OT LTG, Date Established (P)  02/13/17  -HK      Transfer Training OT LTG, Time to Achieve (P)  by discharge  -HK      Transfer Training OT LTG, Activity Type (P)  sit to stand/stand to sit  -HK      Transfer Training OT LTG, [place] Level (P)  contact guard assist;verbal cues required  -HK      Transfer Training OT LTG, Outcome (P)  goal met  -HK      Patient Education OT LTG    Patient Education OT LTG, Date Established (P)  02/13/17  -HK      Patient Education OT LTG, Time to Achieve (P)  by discharge  -HK      Patient Education OT LTG, Education Type (P)  HEP;precautions per surgeon;home safety;1 hand/fariba technique;brace use/care;melissa/doff brace  -HK      Patient Education OT LTG, Education Understanding (P)  demonstrates adequately;verbalizes understanding  -HK      UB Dressing OT LTG    UB Dressing Goal OT LTG, Date Established (P)  02/13/17  -HK      UB Dressing Goal OT LTG, Time to Achieve (P)  by discharge  -HK      UB Dressing Goal OT LTG, Activity Type (P)  pt and family will don/doff LUE sling w/ min assist and min cues in prep for safe discharge home   -HK        User Key  (r) = Recorded By, (t) = Taken By, (c) = Cosigned By    Initials Name Provider Type    SHABBIR Collado, OT Student OT Student                 Outcome Measures       02/13/17 1531          How much help from another is currently needed...    Putting on and taking off regular lower body clothing? (P)  2  -HK      Bathing (including washing, rinsing, and drying) (P)  2  -HK      Toileting (which includes using toilet bed pan or urinal) (P)  2  -HK      Putting on and taking off regular upper body clothing (P)  2  -HK      Taking care of personal grooming (such as brushing teeth) (P)  3  -HK      Eating meals (P)  4  -HK      Score (P)  15  -HK      Functional Assessment    Outcome Measure Options (P)  AM-PAC 6 Clicks Daily Activity (OT)  -HK        User Key  (r) = Recorded By, (t) = Taken By, (c) = Cosigned By    Initials Name Provider Type     Annalee Collado OT Student OT Student          Time Calculation:   OT Start Time: (P) 1531    Therapy Charges for Today     Code Description Service Date Service Provider Modifiers Qty    [id number]  OT EVAL MOD COMPLEXITY 4 2/13/2017 Annalee Collado, OT Student GO 1    [id number]  OT THERAPEUTIC ACT EA 15 MIN 2/13/2017 Annalee Collado, OT Student GO 1    [id number]  OT THER SUPP EA 15 MIN 2/13/2017 Annalee Collado, OT Student GO 3          OT G-codes  OT Professional Judgement Used?: (P) Yes  OT Functional Scales Options: (P) AM-PAC 6 Clicks Daily Activity (OT)  Functional Assessment Tool Used: (P) 6 clicks   Score: (P) 15  Functional Limitation: (P) Self care  Self Care Current Status (): (P) At least 40 percent but less than 60 percent impaired, limited or restricted  Self Care Goal Status (): (P) At least 20 percent but less than 40 percent impaired, limited or restricted    Annalee Collado OT Student  2/13/2017

## 2017-02-13 NOTE — ANESTHESIA PROCEDURE NOTES
Airway  Urgency: elective    Airway not difficult    General Information and Staff    Patient location during procedure: OR  CRNA: KETAN ESCOBEDO    Indications and Patient Condition  Indications for airway management: airway protection    Preoxygenated: yes  MILS not maintained throughout  Mask difficulty assessment: 1 - vent by mask    Final Airway Details  Final airway type: endotracheal airway      Successful airway: ETT  Cuffed: yes   Successful intubation technique: direct laryngoscopy  Endotracheal tube insertion site: oral  Blade: Janes  Blade size: #3  ETT size: 6.5 mm  Cormack-Lehane Classification: grade I - full view of glottis  Placement verified by: chest auscultation and capnometry   Cuff volume (mL): 7  Measured from: lips  ETT to lips (cm): 20  Number of attempts at approach: 1    Additional Comments  Negative epigastric sounds, Breath sound equal bilaterally with symmetric chest rise and fall. Atraumatic, no damage to lips or teeth during intubation

## 2017-02-13 NOTE — ANESTHESIA PROCEDURE NOTES
Peripheral Block    Patient location during procedure: pre-op  Start time: 2/13/2017 8:25 AM  Stop time: 2/13/2017 8:33 AM  Reason for block: at surgeon's request and post-op pain management  Performed by  Anesthesiologist: JAMES CARSON  Preanesthetic Checklist  Completed: patient identified, site marked, surgical consent, pre-op evaluation, timeout performed, IV checked, risks and benefits discussed and monitors and equipment checked  Peripheral Block Prep:  Sterile barriers:cap, gloves, mask and sterile barriers  Prep: ChloraPrep  Patient monitoring: blood pressure monitoring, continuous pulse oximetry and EKG  Peripheral Procedure  Sedation:yes  Guidance:ultrasound guided  Images:still images obtained  Laterality:leftBlock Type:interscalene  Injection Technique:catheterNeedle Type:Tuohy  Needle Gauge:18 G  Catheter Size:20 G (20g)  Medications  Preservative Free Saline:10ml  Local Injected:bupivacaine 0.25% without epinephrine Local Amount Injected:20mL  Post Assessment  Patient Tolerance:comfortable throughout block  Complications:no  Additional Notes  Procedure:                Catheter at skin 10 cm                                      Anesthesia was provide by 2mg midazolam      The pt was placed in semifowlers position with a slight tilt of the thorax contralateral to the insertion site.  The Insertion Site was prepped and draped in sterile fashion.  The skin was anesthetized with Lidocaine 1% 1ml injection utilizing a 25g needle.  Utilizing ultrasound guidance, a BBraun 2 inch 18 g Contiplex echogenic touhy needle was advanced in-plane.  Hydro dissection of tissue was achieved with Normal saline. Major vessels(carotid and Internal Jugular) where visualized as the brachial plexus was approached at the approximate level of C-7/ T-1.  Cervical 5 and Branches of Cervical 6 nerve roots where visualized and the needle tip was placed posterior at the level of C-6 roots.  LA spread was visualized and  injection was made incrementally every 5 mls with aspiration. Injection pressure was normal or little, there was no intraneural injection, no vascular injection.      The BBraun 20 g wire stylet  catheter was then placed under US guidance on the posterior aspect of the Brachial Plexus.  Location of catheter was confirmed with NS injection visualized with US . The tuohy was then removed and the skin was sealed with Skin AFix at catheter insertion site.  Skin was prepped with mastisol and the labeled catheter  was secured with steristrips and a CHG tegaderm. Thank You.

## 2017-02-13 NOTE — OP NOTE
DATE OF OPERATION: 02/13/17  PREOPERATIVE DIAGNOSIS: Left shoulder degenerative joint disease with pain and limitation of function and motion.    POSTOPERATIVE DIAGNOSES:  1. Left shoulder degenerative joint disease with pain and limitation of function and motion.    2. Biceps tenosynovitis.    PROCEDURES PERFORMED:  1. Left total shoulder arthroplasty.    2. Left biceps tenodesis.    SURGEON: Benigno Chavez MD  ASSISTANTS:  1. Freeman Ladd MD, Sports Fellow  2. Kaitlynn Altman PA-C, medically necessary.    ANESTHESIA: General plus block.    ESTIMATED BLOOD LOSS:75mL.    COMPLICATIONS: None.    DISPOSITION: Recovery room in stable condition.    IMPLANTS: [employer] Equinoxe total shoulder system, 13 mm stem press-fit, 1.5 replicator plate, 44 short humeral head, and small alpha cage glenoid peripherally cemented.    INDICATIONS: This is a 66-year-old female with left shoulder pain and limited function and motion secondary to DJD. They have failed conservative treatment and after a discussion of risks, benefits, and alternatives, wished to proceed with shoulder arthroplasty.  DESCRIPTION OF PROCEDURE: On the day of surgery, she identified the left shoulder as the correct operative extremity. This was initialed by the surgeon with the patients's acknowledgment. The patient underwent placement of an interscalene block and was taken to the operating room and placed in the supine position. Upon induction of adequate anesthesia, the patient was brought up to the beach chair position and the shoulder and upper extremity were prepped and draped in the usual sterile fashion. Timeout confirmed the correct patient and operative extremity as well as that antibiotics were on board. A standard deltopectoral approach to the shoulder was carried out. It was carried sharply through the skin and subcutaneous tissue. Medial and lateral flaps were developed over the deltopectoral fascia. The cephalic vein was identified and mobilized  medially with the deltoid. The subdeltoid and subpectoral spaces were mobilized and a blunt retractor was placed deep to this. The clavipectoral fascia was opened on the lateral edge of the conjoined tendon and the retractor was moved deep to this. The leading edge of the pectoralis was released exposing the long head of the biceps. This was tenosynovitic. It was tenodesed to the pectoralis and released proximal to this. The clavipectoral fascia was opened on the lateral edge of conjoined tendon and the retractor moved deep to this. The 3 sisters were identified and coagulated. A subscapularis tenotomy was performed 1 cm medial to the lesser tuberosity and rotator interval was released to the glenoid exposing the humeral head. The inferior capsule was released directly off the humerus to allow greater than 90° of external rotation. A large inferior osteophyte was present which was removed with rongeur. The anatomic neck was exposed and the humeral head osteotomy was performed in approximately 30° of retroversion. The remainder of the osteophytes were removed. The canal was then entered, reamed, and broached. The final stem impacted in in approximately 30° of retroversion. A head protector was placed. The humerus was subluxed posteriorly. The glenoid exposed. Circumferential labral excision and capsular release were performed. A 270° mobilization of the subscapularis was carried out as well.  A centering hole was drilled. The glenoid was gently reamed and peripheral holes were drilled and trialing was carried out. The appropriate size was chosen. Thrombin-soaked Gelfoam was inserted into the holes to obtain hemostasis. Once the cement was prepared, Gelfoam was removed. Cement was inserted into the 3 peripheral holes and pressurized twice prior to impaction of the caged glenoid. Excellent fit was achieved with no rocking or instability. No excess cement was identified. The humerus was carefully subluxed back  anteriorly. A 1.5 replicator plate was chosen and trialing was carried out. The appropriate head size and position were chosen and allowed approximately 50% posterior subluxation with spontaneous reduction, 25% inferior subluxation with spontaneous reduction, and full passive range of motion. The joint was copiously irrigated with orthopedic irrigation mixed with Betadine after the final implants were assembled and locked into place. The shoulder was reduced. The subscapularis was meticulously repaired with #2 FiberWire as was the rotator interval. This was stable to approximately 60° of external rotation, but will be limited to 30° in the perioperative period. The deltopectoral interval was approximated with 0 Vicryl, the subcutaneous tissue with 2-0 Vicryl, and the skin with Monocryl and Dermabond. A sterile dressing was placed. Anesthesia was reversed and the patient was taken to the recovery room in stable condition. All instrument, needle, and sponge counts were correct.      Benigno Chavez MD*

## 2017-02-13 NOTE — CONSULTS
Hospital Medicine Consult    Date of Admission: 2/13/2017  Date of Consult: 02/13/17    Primary Care Physician: Annie Romero MD  Referring Physician: Dr Chavez    Chief complaint/Reason for consultation:  Medical management s/p left shoulder arthroplasty    Subjective   History of Present Illness   Patient status post left shoulder arthroplasty this afternoon.  Patient just got to the floor prior to consult.  Patient states that she was having some pain in the axilla area, but got a pain shot and that is resolved.  Patient moving around with very little difficulty.  OT came into the room to do an evaluation.    Review of Systems   Constitutional: Negative for activity change, appetite change, chills, fatigue and fever.   HENT: Negative for congestion, facial swelling, rhinorrhea and sore throat.    Eyes: Negative.    Respiratory: Negative for cough, shortness of breath, wheezing and stridor.    Cardiovascular: Negative for chest pain, palpitations and leg swelling.   Gastrointestinal: Negative for abdominal distention, abdominal pain, constipation, diarrhea, nausea and vomiting.   Genitourinary: Negative for difficulty urinating, dysuria, flank pain, frequency and urgency.   Musculoskeletal: Positive for joint swelling. Negative for back pain, gait problem, myalgias, neck pain and neck stiffness.   Skin: Negative for pallor, rash and wound.   Neurological: Negative for dizziness, tremors, facial asymmetry, speech difficulty, weakness, light-headedness and headaches.   Psychiatric/Behavioral: Negative for agitation, behavioral problems, confusion, sleep disturbance and suicidal ideas. The patient is not nervous/anxious.    Otherwise complete ROS is negative except as mentioned above.    Past Medical History:  has a past medical history of Arthritis; Depressed; GERD (gastroesophageal reflux disease); History of transfusion; Hypertension; Migraine; PONV (postoperative nausea and vomiting); Urinary incontinence;  Wears contact lenses; Wears dentures; and Wears hearing aid.    Past Surgical History:  has a past surgical history that includes Cholecystectomy; Hysterectomy; Joint replacement; Back surgery; Cervical fusion; Colonoscopy; Shoulder Ligament Repair (Right); Tonsillectomy; and Eye surgery.    Family History: family history is not on file.    Social History:  reports that she has never smoked. She has never used smokeless tobacco. She reports that she drinks alcohol. She reports that she does not use illicit drugs.    Medications: Scheduled Meds:  sennosides-docusate sodium 2 tablet Oral BID   vancomycin 15 mg/kg Intravenous Once     Continuous Infusions:    ropivacaine 6 mL/hr Last Rate: 6 mL/hr (02/13/17 1212)   sodium chloride 50 mL/hr Last Rate: 50 mL/hr (02/13/17 1415)     PRN Meds:.•  bisacodyl  •  docusate sodium  •  HYDROcodone-acetaminophen  •  HYDROmorphone  •  magnesium hydroxide  •  [DISCONTINUED] HYDROmorphone **AND** naloxone  •  oxyCODONE-acetaminophen  Allergies   Allergen Reactions   • Keflex [Cephalexin] GI Intolerance   • Sulfa Antibiotics Rash     SKIN BRIGHT RED      Objective    Physical Exam:   Vital Signs have been reviewed  Physical Exam   Constitutional: She appears well-developed and well-nourished. No distress.   HENT:   Head: Normocephalic and atraumatic.   Mouth/Throat: Oropharynx is clear and moist.   Eyes: Conjunctivae and EOM are normal. Pupils are equal, round, and reactive to light. No scleral icterus.   Neck: Normal range of motion. Neck supple. No JVD present. No tracheal deviation present.   Cardiovascular: Normal rate, regular rhythm, normal heart sounds and intact distal pulses.  Exam reveals no gallop and no friction rub.    No murmur heard.  Pulmonary/Chest: Effort normal and breath sounds normal. No stridor. No respiratory distress. She has no wheezes. She has no rales.   Abdominal: Soft. Bowel sounds are normal. She exhibits no distension. There is no tenderness. There is no  rebound and no guarding.   Musculoskeletal: She exhibits edema (Left shoulder) and tenderness (Left axilla).   No E/C/C. FROM except left shoulder that is in a shoulder harness status post POD 0 completed left shoulder arthroplasty   Neurological: She is alert.   Oriented to person, place, time and situation.  Speech is clear, follows all commands, recent remote memory intact.   Skin: Skin is warm and dry. No rash noted. She is not diaphoretic. No erythema. No pallor.   Psychiatric: She has a normal mood and affect. Her behavior is normal. Judgment and thought content normal.   Pleasant and cooperative   Nursing note and vitals reviewed.    Results Reviewed:  I have personally reviewed current lab, radiology, and data and agree with results.    Results from last 7 days  Lab Units 02/12/17  1225   WBC 10*3/mm3 5.88   HEMOGLOBIN g/dL 11.6   PLATELETS 10*3/mm3 319       Results from last 7 days  Lab Units 02/12/17  1225   SODIUM mmol/L 138   POTASSIUM mmol/L 3.8   TOTAL CO2 mmol/L 33.0*   BUN mg/dL 8*   CREATININE mg/dL 1.00   GLUCOSE mg/dL 85   CALCIUM mg/dL 9.5         Assessment & Plan  Active Problems:    Osteoarthrosis, localized, primary, shoulder region    Hypertension  -Hold home meds since bBP is low  -Will restart as needed    Depressed  -continue with home meds; ordered    Status post reverse total arthroplasty of left shoulder  -Managed per Dr Chavez  -Shoulder sling  -Pain medication  -PT/OT    DVT ppx: per ortho  Dispo: per ortho, but according to patient, she will be discharged in AM    GRACIE Centeno  02/13/17  3:19 PM    Consults

## 2017-02-13 NOTE — H&P
Pre-Op H&P    Chief complaint: Left shoulder pain    HPI:    Patient is a 66 y.o.female presents with osteoarthritis and here today for left total shoulder replacement     Review of Systems:  General ROS: negative for chills, fever or skin lesions;  No changes since last office visit  Cardiovascular ROS: no chest pain or dyspnea on exertion  Respiratory ROS: no cough, shortness of breath, or wheezing    Allergies:   Allergies   Allergen Reactions   • Keflex [Cephalexin] GI Intolerance   • Sulfa Antibiotics Rash     SKIN BRIGHT RED        Home Meds    Prescriptions Prior to Admission   Medication Sig Dispense Refill Last Dose   • amLODIPine (NORVASC) 5 MG tablet Take 5 mg by mouth Daily.   2/13/2017 at 0600   • chlordiazePOXIDE (LIBRIUM) 10 MG capsule Take 10 mg by mouth 3 (Three) Times a Day As Needed for anxiety.   2/13/2017 at 0600   • diphenoxylate-atropine (LOMOTIL) 2.5-0.025 MG per tablet Take 1 tablet by mouth 4 (Four) Times a Day As Needed for diarrhea.   2/13/2017 at 0600   • esomeprazole (nexIUM) 20 MG capsule Take 20 mg by mouth Every Morning Before Breakfast.   2/13/2017 at 0600   • fesoterodine fumarate (TOVIAZ ER) 8 MG tablet sustained-release 24 hour capsule Take  by mouth Daily.   2/13/2017 at 0600   • Hydrocodone-Acetaminophen (VICODIN) 5-300 MG per tablet Take 1 tablet by mouth Every 6 (Six) Hours As Needed for moderate pain (4-6).   2/12/2017 at 1500   • indapamide (LOZOL) 1.25 MG tablet Take 1.25 mg by mouth Every Morning.   2/13/2017 at 0600   • lisinopril (PRINIVIL,ZESTRIL) 20 MG tablet Take 20 mg by mouth Daily.   2/13/2017 at 0600   • potassium chloride (K-DUR) 10 MEQ CR tablet Take 10 mEq by mouth 2 (Two) Times a Day.   2/13/2017 at 0600   • pregabalin (LYRICA) 75 MG capsule Take 75 mg by mouth 2 (Two) Times a Day.   2/13/2017 at 0600   • vilazodone (VIIBRYD) 40 MG tablet tablet Take 40 mg by mouth Daily.   2/13/2017 at 0600   • ramelteon (ROZEREM) 8 MG tablet Take 8 mg by mouth Every Night.       • ZOLMitriptan (ZOMIG) 5 MG tablet Take 5 mg by mouth 1 (One) Time As Needed for migraine.   More than a month at Unknown time       PMH:   Past Medical History   Diagnosis Date   • Arthritis    • Depressed    • GERD (gastroesophageal reflux disease)    • History of transfusion    • Hypertension    • Migraine    • PONV (postoperative nausea and vomiting)      SCOPOLAMINE HELPS-USED DURING LAST SURGERY    • Urinary incontinence    • Wears contact lenses    • Wears dentures      UPPER AND LOWER    • Wears hearing aid      DOESN'T HAVE WITH HER     PSH:    Past Surgical History   Procedure Laterality Date   • Cholecystectomy     • Hysterectomy     • Joint replacement       BILATERAL KNEES, BILATERAL HIPS   • Back surgery       LUMBAR FUSION    • Cervical fusion     • Colonoscopy       2 YEARS AGO    • Shoulder ligament repair Right    • Tonsillectomy     • Eye surgery       LASER FOR KERITONOSIS        Immunization History:  Influenza: yes 2016  Pneumococcal: yes 2016  Tetanus: unknown    Social History:   Tobacco:   History   Smoking Status   • Never Smoker   Smokeless Tobacco   • Never Used      Alcohol:   History   Alcohol Use   • Yes     Comment: [employer]        Physical Exam:  Visit Vitals   • /83 (BP Location: Right arm, Patient Position: Lying)   • Pulse 74   • Temp 97.4 °F (36.3 °C) (Temporal Artery )   • Resp 18   • SpO2 97%       General Appearance:    Alert, cooperative, no distress, appears stated age   Head:    Normocephalic, without obvious abnormality, atraumatic   Lungs:     Clear to auscultation bilaterally, respirations unlabored    Heart:   Regular rate and rhythm, S1 and S2 normal, no murmur, rub    or gallop    Abdomen:    Soft, non-tender.  +bowel sounds   Breast Exam:    deferred   Genitalia:    deferred   Extremities:   Extremities normal, atraumatic, no cyanosis or edema   Skin:   Skin color, texture, turgor normal, no rashes or lesions   Neurologic:   Grossly intact    Results Review  I reviewed the patient's new clinical results.    Cancer Staging (if applicable)  Cancer Patient: __ yes _x_no __unknown; If yes, clinical stage T:__ N:__M:__, stage group    Impression: Osteoarthritis    Plan: Left total shoulder replacement    Jes Coleman, APRN 2/13/2017 8:18 AM

## 2017-02-13 NOTE — ANESTHESIA POSTPROCEDURE EVALUATION
Patient: Jaylyn Fuentes    Procedure Summary     Date Anesthesia Start Anesthesia Stop Room / Location    02/13/17 1026 1217 [facility] / [facility]       Procedure Diagnosis Surgeon Provider    TOTAL SHOULDER REPLACEMENT LEFT (Left Shoulder) Primary localized osteoarthrosis of shoulder region, left; Left bicipital tenosynovitis MD Jamil [facility] MD          Anesthesia Type: general, regional  Last vitals  BP   127/64   Temp   98   Pulse  58   Resp   16   SpO2   94     Post Anesthesia Care and Evaluation    Patient location during evaluation: PACU  Patient participation: complete - patient participated  Level of consciousness: awake and alert  Pain score: 0  Pain management: adequate  Airway patency: patent  Anesthetic complications: No anesthetic complications  PONV Status: none  Cardiovascular status: hemodynamically stable and acceptable  Respiratory status: nonlabored ventilation, acceptable and nasal cannula  Hydration status: acceptable

## 2017-02-13 NOTE — PLAN OF CARE
Problem: Patient Care Overview (Adult)  Goal: Plan of Care Review  Outcome: Ongoing (interventions implemented as appropriate)    02/13/17 1800   Coping/Psychosocial Response Interventions   Plan Of Care Reviewed With patient;sibling   Patient Care Overview   Progress improving   Outcome Evaluation   Outcome Summary/Follow up Plan OT evaluation complete and family present for portion of teaching. Pt had no complaints of pain. She tolerated PROM , IR chest, ER 30. Pt lives alone and is unsure of discharge desire. Due to lethargy from pain meds the pt not candidate for SROM teaching today. Pt passed mobility screen with score of 18, DC PT order.          Problem: Inpatient Occupational Therapy  Goal: Bed Mobility Goal LTG- OT  Outcome: Outcome(s) achieved Date Met:  02/13/17 02/13/17 1800   Bed Mobility OT LTG   Bed Mobility OT LTG, Date Established 02/13/17   Bed Mobility OT LTG, Time to Achieve by discharge   Bed Mobility OT LTG, Activity Type supine to sit/sit to supine   Bed Mobility OT LTG, Wise Level supervision required  (while maintaining NWB LUE)   Bed Mobility OT LTG, Outcome goal met       Goal: Transfer Training Goal 1 LTG- OT  Outcome: Outcome(s) achieved Date Met:  02/13/17 02/13/17 1800   Transfer Training OT LTG   Transfer Training OT LTG, Date Established 02/13/17   Transfer Training OT LTG, Time to Achieve by discharge   Transfer Training OT LTG, Activity Type sit to stand/stand to sit   Transfer Training OT LTG, Wise Level contact guard assist;verbal cues required   Transfer Training OT LTG, Outcome goal met       Goal: Patient Education Goal LTG- OT  Outcome: Ongoing (interventions implemented as appropriate)    02/13/17 1800   Patient Education OT LTG   Patient Education OT LTG, Date Established 02/13/17   Patient Education OT LTG, Time to Achieve by discharge   Patient Education OT LTG, Education Type HEP;precautions per surgeon;home safety;1 hand/fariba technique;brace  use/care;melissa/doff brace   Patient Education OT LTG, Education Understanding demonstrates adequately;verbalizes understanding       Goal: UB Dressing Goal LTG- OT  Outcome: Ongoing (interventions implemented as appropriate)    02/13/17 1800   UB Dressing OT LTG   UB Dressing Goal OT LTG, Date Established 02/13/17   UB Dressing Goal OT LTG, Time to Achieve by discharge   UB Dressing Goal OT LTG, Activity Type pt and family will don/doff LUE sling w/ min assist and min cues in prep for safe discharge home

## 2017-02-14 VITALS
OXYGEN SATURATION: 92 % | HEART RATE: 86 BPM | SYSTOLIC BLOOD PRESSURE: 118 MMHG | HEIGHT: 64 IN | BODY MASS INDEX: 33.29 KG/M2 | RESPIRATION RATE: 17 BRPM | DIASTOLIC BLOOD PRESSURE: 65 MMHG | WEIGHT: 195 LBS | TEMPERATURE: 98.5 F

## 2017-02-14 LAB
ANION GAP SERPL CALCULATED.3IONS-SCNC: 6 MMOL/L (ref 3–11)
BASOPHILS # BLD AUTO: 0.01 10*3/MM3 (ref 0–0.2)
BASOPHILS NFR BLD AUTO: 0.1 % (ref 0–1)
BUN BLD-MCNC: 17 MG/DL (ref 9–23)
BUN/CREAT SERPL: 18.9 (ref 7–25)
CALCIUM SPEC-SCNC: 9.4 MG/DL (ref 8.7–10.4)
CHLORIDE SERPL-SCNC: 102 MMOL/L (ref 99–109)
CO2 SERPL-SCNC: 29 MMOL/L (ref 20–31)
CREAT BLD-MCNC: 0.9 MG/DL (ref 0.6–1.3)
DEPRECATED RDW RBC AUTO: 47 FL (ref 37–54)
EOSINOPHIL # BLD AUTO: 0 10*3/MM3 (ref 0.1–0.3)
EOSINOPHIL NFR BLD AUTO: 0 % (ref 0–3)
ERYTHROCYTE [DISTWIDTH] IN BLOOD BY AUTOMATED COUNT: 13.6 % (ref 11.3–14.5)
GFR SERPL CREATININE-BSD FRML MDRD: 62 ML/MIN/1.73
GLUCOSE BLD-MCNC: 145 MG/DL (ref 70–100)
HCT VFR BLD AUTO: 32.1 % (ref 34.5–44)
HGB BLD-MCNC: 10.7 G/DL (ref 11.5–15.5)
IMM GRANULOCYTES # BLD: 0.03 10*3/MM3 (ref 0–0.03)
IMM GRANULOCYTES NFR BLD: 0.2 % (ref 0–0.6)
LYMPHOCYTES # BLD AUTO: 0.88 10*3/MM3 (ref 0.6–4.8)
LYMPHOCYTES NFR BLD AUTO: 6.5 % (ref 24–44)
MCH RBC QN AUTO: 31.4 PG (ref 27–31)
MCHC RBC AUTO-ENTMCNC: 33.3 G/DL (ref 32–36)
MCV RBC AUTO: 94.1 FL (ref 80–99)
MONOCYTES # BLD AUTO: 0.96 10*3/MM3 (ref 0–1)
MONOCYTES NFR BLD AUTO: 7.1 % (ref 0–12)
NEUTROPHILS # BLD AUTO: 11.58 10*3/MM3 (ref 1.5–8.3)
NEUTROPHILS NFR BLD AUTO: 86.1 % (ref 41–71)
PLATELET # BLD AUTO: 321 10*3/MM3 (ref 150–450)
PMV BLD AUTO: 8.4 FL (ref 6–12)
POTASSIUM BLD-SCNC: 3.8 MMOL/L (ref 3.5–5.5)
RBC # BLD AUTO: 3.41 10*6/MM3 (ref 3.89–5.14)
SODIUM BLD-SCNC: 137 MMOL/L (ref 132–146)
WBC NRBC COR # BLD: 13.46 10*3/MM3 (ref 3.5–10.8)

## 2017-02-14 PROCEDURE — 85025 COMPLETE CBC W/AUTO DIFF WBC: CPT | Performed by: ORTHOPAEDIC SURGERY

## 2017-02-14 PROCEDURE — G8988 SELF CARE GOAL STATUS: HCPCS | Performed by: OCCUPATIONAL THERAPIST

## 2017-02-14 PROCEDURE — 80048 BASIC METABOLIC PNL TOTAL CA: CPT | Performed by: ORTHOPAEDIC SURGERY

## 2017-02-14 PROCEDURE — 99239 HOSP IP/OBS DSCHRG MGMT >30: CPT | Performed by: NURSE PRACTITIONER

## 2017-02-14 PROCEDURE — 97530 THERAPEUTIC ACTIVITIES: CPT | Performed by: OCCUPATIONAL THERAPIST

## 2017-02-14 PROCEDURE — G8987 SELF CARE CURRENT STATUS: HCPCS | Performed by: OCCUPATIONAL THERAPIST

## 2017-02-14 PROCEDURE — G8989 SELF CARE D/C STATUS: HCPCS | Performed by: OCCUPATIONAL THERAPIST

## 2017-02-14 RX ORDER — ACETAMINOPHEN 325 MG/1
650 TABLET ORAL EVERY 6 HOURS PRN
Status: DISCONTINUED | OUTPATIENT
Start: 2017-02-14 | End: 2017-02-14 | Stop reason: HOSPADM

## 2017-02-14 RX ORDER — HYDROCODONE BITARTRATE AND ACETAMINOPHEN 10; 325 MG/1; MG/1
1 TABLET ORAL EVERY 4 HOURS PRN
Qty: 50 TABLET | Refills: 0 | Status: SHIPPED | OUTPATIENT
Start: 2017-02-14 | End: 2017-02-23

## 2017-02-14 RX ADMIN — DOCUSATE SODIUM AND SENNOSIDES 2 TABLET: 8.6; 5 TABLET, FILM COATED ORAL at 08:56

## 2017-02-14 RX ADMIN — ACETAMINOPHEN 650 MG: 325 TABLET, FILM COATED ORAL at 04:36

## 2017-02-14 RX ADMIN — OXYCODONE AND ACETAMINOPHEN 1 TABLET: 5; 325 TABLET ORAL at 02:01

## 2017-02-14 RX ADMIN — OXYBUTYNIN CHLORIDE 10 MG: 5 TABLET, EXTENDED RELEASE ORAL at 08:56

## 2017-02-14 RX ADMIN — HYDROCODONE BITARTRATE AND ACETAMINOPHEN 1 TABLET: 10; 325 TABLET ORAL at 10:57

## 2017-02-14 RX ADMIN — PREGABALIN 75 MG: 75 CAPSULE ORAL at 08:56

## 2017-02-14 NOTE — PROGRESS NOTES
Acute Care - Occupational Therapy Treatment Note  [facility]     Patient Name: Jaylyn Fuentes  : 1950  MRN: [id number]  Today's Date: 2017  Onset of Illness/Injury or Date of Surgery Date: 17  Date of Referral to OT: 17  Referring Physician: MD Scott      Admit Date: 2017    Visit Dx:     ICD-10-CM ICD-9-CM   1. Status post total replacement of left shoulder Z96.612 V43.61     Patient Active Problem List   Diagnosis   • Osteoarthrosis, localized, primary, shoulder region   • Hypertension   • Depressed   • Status post total replacement of left shoulder             Adult Rehabilitation Note       17 0755          Rehab Assessment/Intervention    Discipline occupational therapist   POD#1 left TSA  -AR      Document Type therapy note (daily note);discharge summary  -AR      Subjective Information no complaints;agree to therapy  -AR      Patient Effort, Rehab Treatment excellent  -AR      Symptoms Noted During/After Treatment none  -AR      Precautions/Limitations shoulder precautions;non-weight bearing status;other (see comments)   interscalene nerve catheter  -AR      Equipment Issued to Patient --   DVD of shoulder sling application  -AR      Recorded by [AR] Yessica Chaudhry OT      Pain Assessment    Pain Assessment 0-10  -AR      Pain Score 1  -AR      Post Pain Score 2  -AR      Pain Type Acute pain  -AR      Pain Location Shoulder  -AR      Pain Orientation Left   axilla area  -AR      Pain Intervention(s) Medication (See MAR);Repositioned;Ambulation/increased activity  -AR      Response to Interventions tolerated  -AR      Recorded by [AR] Yessica Chaudhry OT      Cognitive Assessment/Intervention    Current Cognitive/Communication Assessment functional  -AR      Orientation Status oriented x 4  -AR      Follows Commands/Answers Questions 100% of the time;able to follow single-step instructions  -AR      Personal Safety WNL/WFL  -AR      Personal Safety  Interventions gait belt;nonskid shoes/slippers when out of bed;supervised activity  -AR      Additional Documentation --   limited carry-over with new learning of sling application/HE  -AR      Recorded by [AR] Yessica Chaudhry OT      Mobility Assessment/Training    Extremity Weight-Bearing Status left lower extremity  -AR      Left Upper Extremity Weight-Bearing non weight-bearing  -AR      Recorded by [AR] Yessica Chaudhry OT      Bed Mobility, Assessment/Treatment    Bed Mobility, Scoot/Bridge, [place] independent  -AR      Recorded by [AR] Yessica Chaudhry OT      Transfer Assessment/Treatment    Transfers, Sit-Stand [place] independent  -AR      Transfers, Stand-Sit [place] independent  -AR      Recorded by [AR] Yessica Chaudhry OT      Functional Mobility    Functional Mobility- Ind. Level independent  -AR      Recorded by [AR] Yessica Chaudhry OT      Upper Body Bathing Assessment/Training    UB Bathing Assess/Train, Comment Reviewed shoulder precautions and left axilla care, pt did not recall previous teaching; however sister did and OT re-educated pt on maintaining precautions during axilla care   -AR      Recorded by [AR] Yessica Chaudhry OT      Upper Body Dressing Assessment/Training    UB Dressing Assess/Train, Clothing Type doffing:;donning:;pull over   sling  -AR      UB Dressing Assess/Train, Assist Device fariba technique  -AR      UB Dressing Assess/Train, Position sitting  -AR      UB Dressing Assess/Train, [place] moderate assist (50% patient effort);maximum assist (25% patient effort);verbal cues required  -AR      UB Dressing Assess/Train, Impairments ROM decreased;strength decreased   shoulder precautions  -AR      UB Dressing Assess/Train, Comment Reviewed shoulder precautions and ADL retraining to maintain, fariba-dressing, sling mgmt and care of On_Q ball with ADLs. Issued DVD on sling application. Pt and sister donned sling with supervision. Pt's siter  has been sling herself and is familiar with application. Following extensive teaching on sling, at end of ssession pt asked when she would learn how to don sling. Pt declined rehab placement and lives alone. Sister verbalized good understanding of sling mgmt and HEP and reports she will assist her. Recommend home health for reinforcement, called Dr. Chavez's office and left message requesting HH order.   -AR      Recorded by [AR] Yessica Chaudhry OT      Therapy Exercises    Left Upper Extremity AROM:;10 reps;sitting;elbow flexion/extension;hand pumps;pronation/supination;shoulder protraction/retraction;PROM:;shoulder ER/IR;shoulder extension/flexion   Educated on HEP, tolerated PROM /IR chest/ER30  -AR      Exercise Protocols --   Pt completed SROM with vc and supervision  -AR      Recorded by [AR] Yessica Chaudhry OT      Orthotics Prosthetics    Additional Documentation --   pt wanted to complete wall walking, stressed HEP ONLY   -AR      Recorded by [AR] Yessica Chaudhry OT      Positioning and Restraints    Pre-Treatment Position in bed  -AR      Post Treatment Position bed  -AR      In Bed sitting EOB;call light within reach;encouraged to call for assist;with family/caregiver;with brace  -AR      Recorded by [AR] Yessica Chaudhry OT        User Key  (r) = Recorded By, (t) = Taken By, (c) = Cosigned By    Initials Name Effective Dates    EMILIANO Chaudhry OT 06/22/15 -                 OT Goals       02/14/17 1115 02/13/17 1800       Bed Mobility OT LTG    Bed Mobility OT LTG, Date Established  02/13/17  -AR (r) HK (t) AR (c)     Bed Mobility OT LTG, Time to Achieve  by discharge  -AR (r) HK (t) AR (c)     Bed Mobility OT LTG, Activity Type  supine to sit/sit to supine  -AR (r) HK (t) AR (c)     Bed Mobility OT LTG, [place] Level  supervision required   while maintaining NWB LUE  -AR (r) HK (t) AR (c)     Bed Mobility OT LTG, Outcome  goal met  -AR (r) HK (t) AR (c)     Transfer  Training OT LTG    Transfer Training OT LTG, Date Established  02/13/17  -AR (r) HK (t) AR (c)     Transfer Training OT LTG, Time to Achieve  by discharge  -AR (r) HK (t) AR (c)     Transfer Training OT LTG, Activity Type  sit to stand/stand to sit  -AR (r) HK (t) AR (c)     Transfer Training OT LTG, [place] Level  contact guard assist;verbal cues required  -AR (r) HK (t) AR (c)     Transfer Training OT LTG, Outcome  goal met  -AR (r) HK (t) AR (c)     Patient Education OT LTG    Patient Education OT LTG, Date Established  02/13/17  -AR (r) HK (t) AR (c)     Patient Education OT LTG, Time to Achieve  by discharge  -AR (r) HK (t) AR (c)     Patient Education OT LTG, Education Type  HEP;precautions per surgeon;home safety;1 hand/fariba technique;brace use/care;melissa/doff brace  -AR (r) HK (t) AR (c)     Patient Education OT LTG, Education Understanding  demonstrates adequately;verbalizes understanding  -AR (r) HK (t) AR (c)     Patient Education OT LTG Outcome goal not met   pt needs reinfoercment,sister met goal  -AR      Patient Education OT LTG, Reason Goal Not Met discharged from facility  -AR      UB Dressing OT LTG    UB Dressing Goal OT LTG, Date Established  02/13/17  -AR (r) HK (t) AR (c)     UB Dressing Goal OT LTG, Time to Achieve  by discharge  -AR (r) HK (t) AR (c)     UB Dressing Goal OT LTG, Activity Type  pt and family will don/doff LUE sling w/ min assist and min cues in prep for safe discharge home   -AR (r) HK (t) AR (c)     UB Dressing Goal OT LTG, Outcome goal met  -AR        User Key  (r) = Recorded By, (t) = Taken By, (c) = Cosigned By    Initials Name Provider Type    EMILIANO Chaudhry, OT Occupational Therapist     Annalee Collado, OT Student OT Student          Occupational Therapy Education     Title: PT OT SLP Therapies (Done)     Topic: Occupational Therapy (Done)     Point: ADL training (Done)    Description: Instruct learner(s) on proper safety adaptation and remediation techniques  during self care or transfers.   Instruct in proper use of assistive devices.    Learning Progress Summary    Learner Readiness Method Response Comment Documented by Status   Patient Eager GRACE SIMPSON D,H,V LINDSAY BRANDON Reviewed L shoulder precautions, ADL retraining to maintain, sling management including application, wear schedule and proper fit, care of ON-Q ball during ADL routine, home safety, Issued DVD on sling, pt needs reinforcement, dtr demo/verb understanding AR 02/14/17 1113 Done    Acceptance JOE SIMPSON Pt educated on L shoulder precautions, ADL retraining to maintain, sling management, care of ON-Q with ADLS, NWB LUE, LUE HEP, home safety.  02/13/17 1758 Done   Family EagGRACE Sewell D,H,V LINDSAY BRANDON Reviewed L shoulder precautions, ADL retraining to maintain, sling management including application, wear schedule and proper fit, care of ON-Q ball during ADL routine, home safety, Issued DVD on sling, pt needs reinforcement, dtr demo/verb understanding AR 02/14/17 1113 Done               Point: Home exercise program (Done)    Description: Instruct learner(s) on appropriate technique for monitoring, assisting and/or progressing therapeutic exercises/activities.    Learning Progress Summary    Learner Readiness Method Response Comment Documented by Status   Patient Jadaer GRACE SIMPSON D,H,V LINDSAY BRANDON Reviewed L shoulder precautions, ADL retraining to maintain, sling management including application, wear schedule and proper fit, care of ON-Q ball during ADL routine, home safety, Issued DVD on sling, pt needs reinforcement, dtr demo/verb understanding AR 02/14/17 1113 Done    Acceptance JOE SIMPSON Pt educated on L shoulder precautions, ADL retraining to maintain, sling management, care of ON-Q with ADLS, NWB LUE, LUE HEP, home safety.  02/13/17 1758 Done   Family Eager GRACE SIMPSON D,H,V LINDSAY BRANDON Reviewed L shoulder precautions, ADL retraining to maintain, sling management including application, wear schedule and proper fit, care of ON-Q ball during ADL  routine, home safety, Issued DVD on sling, pt needs reinforcement, dtr demo/verb understanding AR 02/14/17 1113 Done               Point: Precautions (Done)    Description: Instruct learner(s) on prescribed precautions during self-care and functional transfers.    Learning Progress Summary    Learner Readiness Method Response Comment Documented by Status   Patient Eager E,TB,D,H,V VU,LINDSAY Reviewed L shoulder precautions, ADL retraining to maintain, sling management including application, wear schedule and proper fit, care of ON-Q ball during ADL routine, home safety, Issued DVD on sling, pt needs reinforcement, dtr demo/verb understanding AR 02/14/17 1113 Done    Acceptance E,D VU Pt educated on L shoulder precautions, ADL retraining to maintain, sling management, care of ON-Q with ADLS, NWB LUE, LUE HEP, home safety.  02/13/17 1758 Done   Family Eager TATIANA,TB,JOE,H,V ROMA,LINDSAY Reviewed L shoulder precautions, ADL retraining to maintain, sling management including application, wear schedule and proper fit, care of ON-Q ball during ADL routine, home safety, Issued DVD on sling, pt needs reinforcement, dtr demo/verb understanding AR 02/14/17 1113 Done               Point: Body mechanics (Done)    Description: Instruct learner(s) on proper positioning and spine alignment during self-care, functional mobility activities and/or exercises.    Learning Progress Summary    Learner Readiness Method Response Comment Documented by Status   Patient Eager E,TB,D,H,V ROMA,LINDSAY Reviewed L shoulder precautions, ADL retraining to maintain, sling management including application, wear schedule and proper fit, care of ON-Q ball during ADL routine, home safety, Issued DVD on sling, pt needs reinforcement, dtr demo/verb understanding AR 02/14/17 1113 Done    Acceptance E,D VU Pt educated on L shoulder precautions, ADL retraining to maintain, sling management, care of ON-Q with ADLS, NWB LUE, LUE HEP, home safety.  02/13/17 1758 Done   Family Jadamy  E,TB,D,H,V LINDSAY BRANDON Reviewed L shoulder precautions, ADL retraining to maintain, sling management including application, wear schedule and proper fit, care of ON-Q ball during ADL routine, home safety, Issued DVD on sling, pt needs reinforcement, dtr demo/verb understanding AR 02/14/17 1113 Done                      User Key     Initials Effective Dates Name Provider Type Discipline    AR 06/22/15 -  Yessica Chaudhry, OT Occupational Therapist OT     12/19/16 -  Annalee Collado OT Student OT Student OT                  OT Recommendation and Plan  Anticipated Discharge Disposition: home with home health, home with assist  Therapy Frequency: daily (per prioriy policy)  Plan of Care Review  Plan Of Care Reviewed With: patient, sibling  Outcome Summary/Follow up Plan: Pt continues require reinforcment with LUE HEP and sling application. Stressed to patient she is to compelte written HEP only and no additional exercises at this time until advised otherwise by surgeon. Pt's sistger at bedside and demo good understanding on precautions, HEP, sling and care on On-Q ball with ADLS. Pt's pain well controlled. Recommend HHPT to assist pt with carry-over of new learning.         Outcome Measures       02/14/17 0755 02/13/17 1531       How much help from another is currently needed...    Putting on and taking off regular lower body clothing? 2  -AR 2  -AR (r) HK (t) AR (c)     Bathing (including washing, rinsing, and drying) 2  -AR 2  -AR (r) HK (t) AR (c)     Toileting (which includes using toilet bed pan or urinal) 2  -AR 2  -AR (r) HK (t) AR (c)     Putting on and taking off regular upper body clothing 2  -AR 2  -AR (r) HK (t) AR (c)     Taking care of personal grooming (such as brushing teeth) 3  -AR 3  -AR (r) HK (t) AR (c)     Eating meals 4  -AR 4  -AR (r) HK (t) AR (c)     Score 15  -AR 15  -AR (r) HK (t)     Functional Assessment    Outcome Measure Options AM-PAC 6 Clicks Daily Activity (OT)  -AR AM-PAC 6 Clicks Daily  Activity (OT)  -AR (r) HK (t) AR (c)       User Key  (r) = Recorded By, (t) = Taken By, (c) = Cosigned By    Initials Name Provider Type    AR Yessica Chaudhry OT Occupational Therapist    HK Annalee Collado, OT Student OT Student           Time Calculation:         Time Calculation- OT       02/14/17 1123          Time Calculation- OT    OT Start Time 0755  -AR      Total Timed Code Minutes- OT 70 minute(s)  -AR      OT Received On 02/14/17  -AR      OT Goal Re-Cert Due Date 02/24/17  -AR        User Key  (r) = Recorded By, (t) = Taken By, (c) = Cosigned By    Initials Name Provider Type    AR Yessica Chaudhry OT Occupational Therapist           Therapy Charges for Today     Code Description Service Date Service Provider Modifiers Qty    03840571185 HC OT SELFCARE CURRENT 2/13/2017 Yessica Chaudhry, OT GO, CK 1    31078070909 HC OT SELFCARE PROJECTED 2/13/2017 Yessica Chaudhry OT GO, CJ 1    83297616868 HC OT SELFCARE CURRENT 2/14/2017 Yessicabarb Chaudhry, OT GO, CK 1    60295945624 HC OT SELFCARE PROJECTED 2/14/2017 Yessica Chaudhry, OT GO, CJ 1    00411469369 HC OT SELFCARE DISCHARGE 2/14/2017 Yessica Chaudhry OT GO, CK 1    44508780300 HC OT THERAPEUTIC ACT EA 15 MIN 2/14/2017 Yessica Chaudhry OT GO 5          OT G-codes  OT Professional Judgement Used?: Yes  OT Functional Scales Options: AM-PAC 6 Clicks Basic Mobility (PT)  Functional Assessment Tool Used: 6 clicks  Score: 15  Functional Limitation: Self care  Self Care Current Status (): At least 40 percent but less than 60 percent impaired, limited or restricted  Self Care Goal Status (): At least 20 percent but less than 40 percent impaired, limited or restricted  Self Care Discharge Status (): At least 40 percent but less than 60 percent impaired, limited or restricted    Yessica Chaudhry OT  2/14/2017

## 2017-02-14 NOTE — PROGRESS NOTES
[facility]    Acute pain service Inpatient Progress Note    Patient Name: Jaylyn Fuentes  :  1950  MRN:  [id number]        Acute Pain  Service Inpatient Progress Note:    Analgesia:Excellent  Pain Score:0/10  LOC: alert and awake  Side Effects:None  Catheter Site:clean, dressing intact and dry  Cath type: peripheral nerve cath with ON Q  Infusion rate: 6ml/hr  Catheter Plan:Continue catheter infusion rate unchanged and Catheter to remain Insitu  Comments: Doing great, moves fingers, thumb slightly numb

## 2017-02-14 NOTE — PROGRESS NOTES
Discharge Planning Assessment  [facility]     Patient Name: Jaylyn Fuentes  MRN: [id number]  Today's Date: 2/14/2017    Admit Date: 2/13/2017          Discharge Needs Assessment       02/14/17 0924    Living Environment    Lives With alone    Living Arrangements house    Quality Of Family Relationships supportive    Able to Return to Prior Living Arrangements yes    Living Arrangement Comments Ms. Fuentes lives alone in a 2 story home, avoidable stairs, in [facility].  Her sister Tiffanie Gonzalez, will be assisting her at home after discharge.    Discharge Needs Assessment    Concerns To Be Addressed no discharge needs identified    Community Agency Name(S) --   [facility], ph [phone]    Equipment Currently Used at Home cane, straight   walking stick    Equipment Needed After Discharge none    Transportation Available car;family or friend will provide    Discharge Disposition home or self-care            Discharge Plan       02/14/17 0993    Case Management/Social Work Plan    Plan Home    Patient/Family In Agreement With Plan yes    Additional Comments Met with Ms. Fuentes and her sister, Tiffanie, at the bedside to discuss discharge planning.  Prior to admission, Ms. Fuentes was independent with her ADL's and is currently ambulating independently.  No home health in the home.  Ms. Fuentes is requesting [facility] for physical therapy.  She has been given excercises by [facility] PT/OT for her shoulder.  PT has called Dr. Chavez's office for orders.  If agreeable, patient has requested [facility] in [place].  CM wll continue to follow.        Discharge Placement     No information found        Expected Discharge Date and Time     Expected Discharge Date Expected Discharge Time    Feb 14, 2017               Demographic Summary       02/14/17 0920    Referral Information    Arrived From home or self-care    Reason For Consult discharge planning    Record Reviewed clinical discipline  documentation;history and physical;medical record    Contact Information    Permission Granted to Share Information With     Primary Care Physician Information    Name Annie Romero MD            Functional Status       02/14/17 0927    Activity Tolerance    Current Activity Limitations none    Usual Activity Tolerance good      02/14/17 0922    Functional Status Prior    Ambulation 0-->independent    Transferring 0-->independent    Toileting 0-->independent    Bathing 0-->independent    Dressing 0-->independent    Eating 0-->independent    Communication 0-->understands/communicates without difficulty    Swallowing 0-->swallows foods/liquids without difficulty    IADL    Medications independent    Meal Preparation independent    Housekeeping independent    Laundry independent    Shopping independent    Oral Care independent    Cognitive/Perceptual/Developmental    Current Mental Status/Cognitive Functioning no deficits noted    Recent Changes in Mental Status/Cognitive Functioning no changes    Employment/Financial    Employment/Finance Comments Ms. Fuentes has prescription drug coverage with [facility] Medicare Replacement.  Verified insurance with patient.  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            Psychosocial     None            Abuse/Neglect     None            Legal     None            Substance Abuse       02/14/17 0923    Substance Use, Patient    Substance Use current alcohol use    Alcohol Type wine;beer    Alcohol Frequency 2-4 times/mo    Reported Level Of Alcohol Use social            Patient Forms     None          Olimpia Chacko

## 2017-02-14 NOTE — DISCHARGE SUMMARY
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     Presenting Problem/History of Present Illness  Osteoarthrosis, localized, primary, shoulder region, left [M19.012]     Chief Complaint on Day of Discharge:   Patient still complaining of left axilla pain, but states it's much improved since yesterday.    Hospital Course  Ms. Fuentes is a 66-year-old female who was admitted on 12/13/17 status post total left shoulder replacement by Dr. Chavez.  Patient was very mobile immediately after her surgery and apparently walked the halls several time yesterday evening and this morning.  Patient worked with PT/OT this morning and is very comfortable doing her discharge exercises.  Patient states that her left axilla pain has improved, but understands that that will take the longest to resolve.  Patient was dressed and sitting on the side of the bed waiting to be discharged.  Patient's ride was in the room.  Patient has her follow-up instructions from Dr. Chavez.      Procedures Performed  Procedure(s):  TOTAL SHOULDER REPLACEMENT LEFT     Consults:   Consults     Date and Time Order Name Status Description    2/13/2017 1933 Inpatient Consult to Hospitalist In process         Pertinent Test Results:  Imaging Results (last 7 days)     Procedure Component Value Units Date/Time    XR Shoulder 2+ View Left [[id number]] Collected:  02/13/17 1345     Updated:  02/13/17 1400     Narrative:       EXAMINATION: XR SHOULDER 2+ VIEWS, LEFT-02/13/2017:      INDICATION: Postop.      COMPARISON: NONE     FINDINGS: Views of the left shoulder demonstrate a total left shoulder  prosthesis. The prosthetic device is well positioned. There is no  loosening or dislocation. There is no fracture.           Impression:       Expected postoperative findings.     D:  02/13/2017  E:  02/13/2017     This report was finalized on 2/13/2017 1:58 PM by Dr. Alverto Disla MD.           Lab Results (last 7 days)     Procedure Component Value Units Date/Time    CBC Auto Differential [[id number]]  (Abnormal) Collected:  02/14/17 0446    Specimen:  Blood Updated:  02/14/17 0601     WBC 13.46 (H) 10*3/mm3      RBC 3.41 (L) 10*6/mm3      Hemoglobin 10.7 (L) g/dL      Hematocrit 32.1 (L) %      MCV 94.1 fL      MCH 31.4 (H) pg      MCHC 33.3 g/dL      RDW 13.6 %      RDW-SD 47.0 fl      MPV 8.4 fL      Platelets 321 10*3/mm3      Neutrophil % 86.1 (H) %      Lymphocyte % 6.5 (L) %      Monocyte % 7.1 %      Eosinophil % 0.0 %      Basophil % 0.1 %      Immature Grans % 0.2 %      Neutrophils, Absolute 11.58 (H) 10*3/mm3      Lymphocytes, Absolute 0.88 10*3/mm3      Monocytes, Absolute 0.96 10*3/mm3      Eosinophils, Absolute 0.00 (L) 10*3/mm3      Basophils, Absolute 0.01 10*3/mm3      Immature Grans, Absolute 0.03 10*3/mm3     CBC & Differential [[id number]] Collected:  02/14/17 0446    Specimen:  Blood Updated:  02/14/17 0601    Narrative:       The following orders were created for panel order CBC & Differential.  Procedure                               Abnormality         Status                     ---------                               -----------         ------                     CBC Auto Differential[[id number]]         Abnormal            Final result                 Please view results for these tests on the individual orders.    Basic Metabolic Panel [[id number]]  (Abnormal) Collected:  02/14/17 0446    Specimen:  Blood  "Updated:  02/14/17 0622     Glucose 145 (H) mg/dL      BUN 17 mg/dL      Creatinine 0.90 mg/dL      Sodium 137 mmol/L      Potassium 3.8 mmol/L      Chloride 102 mmol/L      CO2 29.0 mmol/L      Calcium 9.4 mg/dL      eGFR Non African Amer 62 mL/min/1.73      BUN/Creatinine Ratio 18.9      Anion Gap 6.0 mmol/L     Narrative:       [employer] Guidelines    Stage                           Description                             GFR                      1                               Normal or High                          90+  2                               Mild decrease                            60-89  3                               Moderate decrease                   30-59  4                               Severe decrease                       15-29  5                               Kidney failure                             <15          Condition on Discharge:  Stable    Physical Exam on Discharge:  Visit Vitals   • /65   • Pulse 86   • Temp 98.5 °F (36.9 °C) (Oral)   • Resp 17   • Ht 64\" (162.6 cm)   • Wt 195 lb (88.5 kg)   • SpO2 92%   • BMI 33.47 kg/m2     Physical Exam  Constitutional: She appears well-developed and well-nourished. No distress.   HENT:   Head: Normocephalic and atraumatic.   Mouth/Throat: Oropharynx is clear and moist.   Eyes: Conjunctivae and EOM are normal. Pupils are equal, round, and reactive to light. No scleral icterus.   Neck: Normal range of motion. Neck supple. No JVD present. No tracheal deviation present.   Cardiovascular: Normal rate, regular rhythm, normal heart sounds and intact distal pulses. Exam reveals no gallop and no friction rub.   No murmur heard.  Pulmonary/Chest: Effort normal and breath sounds normal. No stridor. No respiratory distress. She has no wheezes. She has no rales.   Abdominal: Soft. Bowel sounds are normal. She exhibits no distension. There is no tenderness. There is no rebound and no guarding.   Musculoskeletal: She exhibits edema " (Left shoulder) and tenderness (Left axilla).   No E/C/C. FROM except left shoulder that is in a shoulder harness status post POD 0 completed left shoulder arthroplasty   Neurological: She is alert.   Oriented to person, place, time and situation. Speech is clear, follows all commands, recent remote memory intact.   Skin: Skin is warm and dry. No rash noted. She is not diaphoretic. No erythema. No pallor.   Psychiatric: She has a normal mood and affect. Her behavior is normal. Judgment and thought content normal.   Pleasant and cooperative   Nursing note and vitals reviewed.    Discharge Disposition  Home or Self Care    Discharge Medications   Jaylyn Fuentes TATIANA   Home Medication Instructions SERJIO:[id number]    Printed on:02/14/17 1042   Medication Information                      amLODIPine (NORVASC) 5 MG tablet  Take 5 mg by mouth Daily.             chlordiazePOXIDE (LIBRIUM) 10 MG capsule  Take 10 mg by mouth 3 (Three) Times a Day As Needed for anxiety.             diphenoxylate-atropine (LOMOTIL) 2.5-0.025 MG per tablet  Take 1 tablet by mouth 4 (Four) Times a Day As Needed for diarrhea.             esomeprazole (nexIUM) 20 MG capsule  Take 20 mg by mouth Every Morning Before Breakfast.             fesoterodine fumarate (TOVIAZ ER) 8 MG tablet sustained-release 24 hour capsule  Take  by mouth Daily.             HYDROcodone-acetaminophen (NORCO)  MG per tablet  Take 1 tablet by mouth Every 4 (Four) Hours As Needed for moderate pain (4-6) for up to 9 days.             indapamide (LOZOL) 1.25 MG tablet  Take 1.25 mg by mouth Every Morning.             lisinopril (PRINIVIL,ZESTRIL) 20 MG tablet  Take 20 mg by mouth Daily.             potassium chloride (K-DUR) 10 MEQ CR tablet  Take 10 mEq by mouth 2 (Two) Times a Day.             pregabalin (LYRICA) 75 MG capsule  Take 75 mg by mouth 2 (Two) Times a Day.             ramelteon (ROZEREM) 8 MG tablet  Take 8 mg by mouth Every Night.             vilazodone  (VIIBRYD) 40 MG tablet tablet  Take 40 mg by mouth Daily.             ZOLMitriptan (ZOMIG) 5 MG tablet  Take 5 mg by mouth 1 (One) Time As Needed for migraine.               Discharge Diet:   Diet Instructions     Diet: Regular; Thin Liquids, No Restrictions       Discharge Diet:  Regular   Fluid Consistency:  Thin Liquids, No Restrictions               Discharge Care Plan / Instructions:  Activity at Discharge:   Activity Instructions     Activity as Tolerated                   Follow-up Appointments  No future appointments.  Referrals and Follow-ups to Schedule     Discharge Follow-up    As directed    Follow Up:  1 Week               Test Results Pending at Discharge  None     GRACIE Centeno 02/14/17 10:42 AM    Time: Discharge 35 min    Please note that portions of this note may have been completed with a voice recognition program. Efforts were made to edit the dictations, but occasionally words are mistranscribed.

## 2017-02-14 NOTE — PROGRESS NOTES
Continued Stay Note  [facility]     Patient Name: Jaylyn Fuentes  MRN: [id number]  Today's Date: 2/14/2017    Admit Date: 2/13/2017          Discharge Plan       02/14/17 1236    Case Management/Social Work Plan    Plan Home with [facility]    Patient/Family In Agreement With Plan yes    Additional Comments Contacted [facility] per patient's request and they are unable to take Ms. Fuentes's insurance.  Spoke with Ms. Fuentes and she was agreeable to [facility].  Contacted Molly with [facility] and she will forward referral to [place] office.                Discharge Codes       02/14/17 1235    Discharge Codes    Discharge Codes DD  Home with A other        Expected Discharge Date and Time     Expected Discharge Date Expected Discharge Time    Feb 14, 2017             Olimpia Chacko

## 2017-02-14 NOTE — PLAN OF CARE
Problem: Patient Care Overview (Adult)  Goal: Plan of Care Review    02/14/17 0347   Coping/Psychosocial Response Interventions   Plan Of Care Reviewed With patient   Patient Care Overview   Progress improving   Outcome Evaluation   Outcome Summary/Follow up Plan Pt is alert and oriented x4, VSS, c/o pain managed with PRN pain medication. Pt ambulated in [place] approximately 300 feet with minimal assist x1 with a gait belt. Left radial pulse strong,  moderate, no c/o numbness or tingling.        Goal: Discharge Needs Assessment    02/14/17 0347   Discharge Needs Assessment   Concerns To Be Addressed no discharge needs identified   Readmission Within The Last 30 Days no previous admission in last 30 days   Self-Care   Equipment Currently Used at Home none         Problem: Fall Risk (Adult)  Intervention: Monitor/Assist with Self Care    02/13/17 2140 02/14/17 0300   Monitor/Assist with Self Care   Ambulation 2-->assistive person --    Transferring 2-->assistive person --    Toileting 2-->assistive person --    Bathing 2-->assistive person --    Dressing 2-->assistive person --    Eating 0-->independent --    Communication 0-->understands/communicates without difficulty --    Swallowing (if score 2 or more for any item, consult Rehab Services) 0-->swallows foods/liquids without difficulty --    Activity   Activity Type --  activity adjusted per tolerance   Activity Level of Assistance assistance, 1 person --        Intervention: Reduce Risk/Promote Restraint Free Environment    02/14/17 0300   Safety Interventions   Safety/Security Measures bed alarm set   Environmental Safety Modification assistive device/personal items within reach;clutter free environment maintained;lighting adjusted;room organization consistent   Restraint Interventions   Safety Promotion/Fall Prevention safety round/check completed;fall prevention program maintained;nonskid shoes/slippers when out of bed;toileting scheduled         Goal:  Identify Related Risk Factors and Signs and Symptoms    02/14/17 0347   Fall Risk   Fall Risk: Related Risk Factors gait/mobility problems  (recent surgery)   Fall Risk: Signs and Symptoms presence of risk factors       Goal: Absence of Falls    02/14/17 0347   Fall Risk (Adult)   Absence of Falls making progress toward outcome

## 2017-02-14 NOTE — PLAN OF CARE
Problem: Patient Care Overview (Adult)  Goal: Plan of Care Review  Outcome: Outcome(s) achieved Date Met:  02/14/17 02/14/17 1115   Coping/Psychosocial Response Interventions   Plan Of Care Reviewed With patient;sibling   Outcome Evaluation   Outcome Summary/Follow up Plan Pt continues requires reinforcment with LUE HEP and sling application. Stressed to patient she is to compelte written HEP only and no additional exercises at this time until advised otherwise by surgeon. Pt's sistger at bedside and demo good understanding on precautions, HEP, sling and care on On-Q ball with ADLS. Pt's pain well controlled. Recommend HHPT to assist pt with carry-over of new learning.Pt continues decline rehab.         Problem: Inpatient Occupational Therapy  Goal: Patient Education Goal LTG- OT  Outcome: Unable to achieve outcome(s) by discharge Date Met:  02/14/17 02/13/17 1800 02/14/17 1115   Patient Education OT LTG   Patient Education OT LTG, Date Established 02/13/17 --    Patient Education OT LTG, Time to Achieve by discharge --    Patient Education OT LTG, Education Type HEP;precautions per surgeon;home safety;1 hand/fariba technique;brace use/care;melissa/doff brace --    Patient Education OT LTG, Education Understanding demonstrates adequately;verbalizes understanding --    Patient Education OT LTG Outcome --  goal not met  (pt needs reinfoercment,sister met goal)   Patient Education OT LTG, Reason Goal Not Met --  discharged from facility       Goal: UB Dressing Goal LTG- OT  Outcome: Outcome(s) achieved Date Met:  02/14/17 02/13/17 1800 02/14/17 1115   UB Dressing OT LTG   UB Dressing Goal OT LTG, Date Established 02/13/17 --    UB Dressing Goal OT LTG, Time to Achieve by discharge --    UB Dressing Goal OT LTG, Activity Type pt and family will don/doff LUE sling w/ min assist and min cues in prep for safe discharge home  --    UB Dressing Goal OT LTG, Outcome --  goal met

## 2017-02-14 NOTE — PROGRESS NOTES
Orthopedic Daily Progress Note      CC: Left shoulder pain      Pain controlled. Complains of some pain in axilla.  General: no fevers, chills  Abdomen: No nausea, vomiting, or diarrhea    No other complaints    Physical Exam:  I have reviewed the vital signs.  Temp:  [97.2 °F (36.2 °C)-98.1 °F (36.7 °C)] 97.2 °F (36.2 °C)  Heart Rate:  [73-85] 85  Resp:  [16-18] 16  BP: ()/(59-74) 118/73    Objective  General Appearance:    Alert, cooperative, no distress  Extremities: No clubbing, cyanosis, or edema to lower extremities  Pulses:  2+ in distal surgical extremity  Skin: Dressing Clean/dry/intact      Results Review:    I have reviewed the labs, radiology results and diagnostic studies:Yes      Results from last 7 days  Lab Units 02/14/17  0446   WBC 10*3/mm3 13.46*   HEMOGLOBIN g/dL 10.7*   PLATELETS 10*3/mm3 321       Results from last 7 days  Lab Units 02/14/17  0446   SODIUM mmol/L 137   POTASSIUM mmol/L 3.8   TOTAL CO2 mmol/L 29.0   CREATININE mg/dL 0.90   GLUCOSE mg/dL 145*       I have reviewed the medications.    Assessment/Problem List  POD# 1 S/p Left total shoulder arthroplasty    Plan  - PT exercises  - D/c home today      Discharge Planning: I expect patient to be discharged to home today.     Boyd Fitzgerald MD  02/14/17  7:51 AM

## 2017-02-15 NOTE — PROGRESS NOTES
[facility]    Nerve Cath Post Op Call    Patient Name: Jaylyn Fuentes  :  1950  MRN:  [id number]  Date of Discharge: 2017    Nerve Cath Post Op Call:    Catheter Plan:Patient called/No answer/Message left to call [facility] pain service for any questions or complaints

## 2017-02-15 NOTE — THERAPY DISCHARGE NOTE
Acute Care - Occupational Therapy Discharge Summary  [facility]     Patient Name: Jaylyn Fuentes  : 1950  MRN: [id number]    Today's Date: 2/15/2017  Onset of Illness/Injury or Date of Surgery Date: 17    Date of Referral to OT: 17  Referring Physician: MD Scott      Admit Date: 2017        OT Recommendation and Plan    Visit Dx:    ICD-10-CM ICD-9-CM   1. Status post total replacement of left shoulder Z96.612 V43.61                     OT Goals       17 1115 17 1800       Bed Mobility OT LTG    Bed Mobility OT LTG, Date Established  17  -AR (r) HK (t) AR (c)     Bed Mobility OT LTG, Time to Achieve  by discharge  -AR (r) HK (t) AR (c)     Bed Mobility OT LTG, Activity Type  supine to sit/sit to supine  -AR (r) HK (t) AR (c)     Bed Mobility OT LTG, [place] Level  supervision required   while maintaining NWB LUE  -AR (r) HK (t) AR (c)     Bed Mobility OT LTG, Outcome  goal met  -AR (r) HK (t) AR (c)     Transfer Training OT LTG    Transfer Training OT LTG, Date Established  17  -AR (r) HK (t) AR (c)     Transfer Training OT LTG, Time to Achieve  by discharge  -AR (r) HK (t) AR (c)     Transfer Training OT LTG, Activity Type  sit to stand/stand to sit  -AR (r) HK (t) AR (c)     Transfer Training OT LTG, [place] Level  contact guard assist;verbal cues required  -AR (r) HK (t) AR (c)     Transfer Training OT LTG, Outcome  goal met  -AR (r) HK (t) AR (c)     Patient Education OT LTG    Patient Education OT LTG, Date Established  17  -AR (r) HK (t) AR (c)     Patient Education OT LTG, Time to Achieve  by discharge  -AR (r) HK (t) AR (c)     Patient Education OT LTG, Education Type  HEP;precautions per surgeon;home safety;1 hand/fariba technique;brace use/care;melissa/doff brace  -AR (r) HK (t) AR (c)     Patient Education OT LTG, Education Understanding  demonstrates adequately;verbalizes understanding  -AR (r) HK (t) AR (c)     Patient Education OT LTG  Outcome goal not met   pt needs reinfoercment,sister met goal  -AR      Patient Education OT LTG, Reason Goal Not Met discharged from facility  -AR      UB Dressing OT LTG    UB Dressing Goal OT LTG, Date Established  02/13/17  -AR (r) HK (t) AR (c)     UB Dressing Goal OT LTG, Time to Achieve  by discharge  -AR (r) HK (t) AR (c)     UB Dressing Goal OT LTG, Activity Type  pt and family will don/doff LUE sling w/ min assist and min cues in prep for safe discharge home   -AR (r) HK (t) AR (c)     UB Dressing Goal OT LTG, Outcome goal met  -AR        User Key  (r) = Recorded By, (t) = Taken By, (c) = Cosigned By    Initials Name Provider Type    EMILIANO Chaudhry OT Occupational Therapist    SHABBIR Collado, OT Student OT Student                Outcome Measures       02/14/17 0755 02/13/17 1531       How much help from another is currently needed...    Putting on and taking off regular lower body clothing? 2  -AR 2  -AR (r) HK (t) AR (c)     Bathing (including washing, rinsing, and drying) 2  -AR 2  -AR (r) HK (t) AR (c)     Toileting (which includes using toilet bed pan or urinal) 2  -AR 2  -AR (r) HK (t) AR (c)     Putting on and taking off regular upper body clothing 2  -AR 2  -AR (r) HK (t) AR (c)     Taking care of personal grooming (such as brushing teeth) 3  -AR 3  -AR (r) HK (t) AR (c)     Eating meals 4  -AR 4  -AR (r) HK (t) AR (c)     Score 15  -AR 15  -AR (r) HK (t)     Functional Assessment    Outcome Measure Options AM-PAC 6 Clicks Daily Activity (OT)  -AR AM-PAC 6 Clicks Daily Activity (OT)  -AR (r) HK (t) AR (c)       User Key  (r) = Recorded By, (t) = Taken By, (c) = Cosigned By    Initials Name Provider Type    EMILIANO Chaudhry OT Occupational Therapist    SHABBIR Collado, OT Student OT Student              OT Discharge Summary  Reason for Discharge: Discharge from facility  Outcomes Achieved: Refer to plan of care for updates on goals achieved  Discharge Destination: Home      Nathalia GARCIA  Jorge, OT  2/15/2017

## 2017-02-16 NOTE — PROGRESS NOTES
[facility]    Nerve Cath Post Op Call    Patient Name: Jaylyn Fuentes  :  1950  MRN:  [id number]  Date of Discharge: 2017    Nerve Cath Post Op Call:    Catheter Plan:Patient called/No answer/Message left to call [facility] pain service for any questions or complaints

## 2017-02-17 NOTE — PROGRESS NOTES
[facility]    Nerve Cath Post Op Call    Patient Name: Jaylyn Fuentes  :  1950  MRN:  [id number]  Date of Discharge: 2017    Nerve Cath Post Op Call:    Catheter Plan:Patient/Family member report nerve catheter previously discontinued, tip intact

## 2017-02-20 ENCOUNTER — TRANSCRIBE ORDERS (OUTPATIENT)
Dept: ADMINISTRATIVE | Facility: HOSPITAL | Age: 67
End: 2017-02-20

## 2017-02-20 ENCOUNTER — HOSPITAL ENCOUNTER (OUTPATIENT)
Dept: CARDIOLOGY | Facility: HOSPITAL | Age: 67
Discharge: HOME | End: 2017-02-20
Admitting: ORTHOPAEDIC SURGERY

## 2017-02-20 DIAGNOSIS — I82.622 ARM DVT (DEEP VENOUS THROMBOEMBOLISM), ACUTE, LEFT (HCC): Primary | ICD-10-CM

## 2017-02-20 DIAGNOSIS — I82.622 ARM DVT (DEEP VENOUS THROMBOEMBOLISM), ACUTE, LEFT (HCC): ICD-10-CM

## 2017-02-20 LAB
BH CV UPPER VENOUS LEFT AXILLARY AUGMENT: NORMAL
BH CV UPPER VENOUS LEFT AXILLARY COMPETENT: NORMAL
BH CV UPPER VENOUS LEFT AXILLARY COMPRESS: NORMAL
BH CV UPPER VENOUS LEFT AXILLARY PHASIC: NORMAL
BH CV UPPER VENOUS LEFT AXILLARY SPONT: NORMAL
BH CV UPPER VENOUS LEFT BASILIC FOREARM COMPRESS: NORMAL
BH CV UPPER VENOUS LEFT BASILIC UPPER COMPRESS: NORMAL
BH CV UPPER VENOUS LEFT BRACHIAL COMPRESS: NORMAL
BH CV UPPER VENOUS LEFT CEPHALIC FOREARM COMPRESS: NORMAL
BH CV UPPER VENOUS LEFT CEPHALIC UPPER COLOR: 1
BH CV UPPER VENOUS LEFT CEPHALIC UPPER COMPRESS: NORMAL
BH CV UPPER VENOUS LEFT CEPHALIC UPPER THROMBUS: NORMAL
BH CV UPPER VENOUS LEFT INTERNAL JUGULAR AUGMENT: NORMAL
BH CV UPPER VENOUS LEFT INTERNAL JUGULAR COMPETENT: NORMAL
BH CV UPPER VENOUS LEFT INTERNAL JUGULAR COMPRESS: NORMAL
BH CV UPPER VENOUS LEFT INTERNAL JUGULAR PHASIC: NORMAL
BH CV UPPER VENOUS LEFT INTERNAL JUGULAR SPONT: NORMAL
BH CV UPPER VENOUS LEFT RADIAL COMPRESS: NORMAL
BH CV UPPER VENOUS LEFT SUBCLAVIAN AUGMENT: NORMAL
BH CV UPPER VENOUS LEFT SUBCLAVIAN COMPETENT: NORMAL
BH CV UPPER VENOUS LEFT SUBCLAVIAN COMPRESS: NORMAL
BH CV UPPER VENOUS LEFT SUBCLAVIAN PHASIC: NORMAL
BH CV UPPER VENOUS LEFT SUBCLAVIAN SPONT: NORMAL
BH CV UPPER VENOUS LEFT ULNAR COMPRESS: NORMAL
BH CV UPPER VENOUS RIGHT INTERNAL JUGULAR AUGMENT: NORMAL
BH CV UPPER VENOUS RIGHT INTERNAL JUGULAR COMPETENT: NORMAL
BH CV UPPER VENOUS RIGHT INTERNAL JUGULAR COMPRESS: NORMAL
BH CV UPPER VENOUS RIGHT INTERNAL JUGULAR PHASIC: NORMAL
BH CV UPPER VENOUS RIGHT INTERNAL JUGULAR SPONT: NORMAL
BH CV UPPER VENOUS RIGHT SUBCLAVIAN AUGMENT: NORMAL
BH CV UPPER VENOUS RIGHT SUBCLAVIAN COMPETENT: NORMAL
BH CV UPPER VENOUS RIGHT SUBCLAVIAN COMPRESS: NORMAL
BH CV UPPER VENOUS RIGHT SUBCLAVIAN PHASIC: NORMAL
BH CV UPPER VENOUS RIGHT SUBCLAVIAN SPONT: NORMAL

## 2017-02-20 PROCEDURE — 93971 EXTREMITY STUDY: CPT

## 2017-02-20 NOTE — PROGRESS NOTES
Left upper extremity venous doppler study completed. Preliminary positive for acute superficial thrombophlebitis, no extension to the deep system called to Ade. Report of Cephalic vein prox to the [facility] no extension to the SCV given to Dr. Chavez. OK to release patient, she will se him in the morning

## 2018-01-09 ENCOUNTER — TRANSCRIBE ORDERS (OUTPATIENT)
Dept: ADMINISTRATIVE | Facility: HOSPITAL | Age: 68
End: 2018-01-09

## 2018-01-09 ENCOUNTER — HOSPITAL ENCOUNTER (OUTPATIENT)
Dept: INFUSION THERAPY | Facility: HOSPITAL | Age: 68
Discharge: HOME | End: 2018-01-09
Admitting: INTERNAL MEDICINE

## 2018-01-09 VITALS
TEMPERATURE: 98.3 F | SYSTOLIC BLOOD PRESSURE: 125 MMHG | HEART RATE: 86 BPM | RESPIRATION RATE: 16 BRPM | DIASTOLIC BLOOD PRESSURE: 83 MMHG | OXYGEN SATURATION: 96 %

## 2018-01-09 DIAGNOSIS — E86.0 DEHYDRATION: Primary | ICD-10-CM

## 2018-01-09 PROCEDURE — 96361 HYDRATE IV INFUSION ADD-ON: CPT

## 2018-01-09 PROCEDURE — 96360 HYDRATION IV INFUSION INIT: CPT

## 2018-01-09 RX ORDER — METOPROLOL TARTRATE 50 MG/1
50 TABLET, FILM COATED ORAL 2 TIMES DAILY
Status: ON HOLD | COMMUNITY
End: 2021-10-14

## 2018-01-09 RX ORDER — SODIUM CHLORIDE 9 MG/ML
1000 INJECTION, SOLUTION INTRAVENOUS ONCE
Status: COMPLETED | OUTPATIENT
Start: 2018-01-09 | End: 2018-01-09

## 2018-01-09 RX ADMIN — SODIUM CHLORIDE 1000 ML: 9 INJECTION, SOLUTION INTRAVENOUS at 15:25

## 2018-01-09 NOTE — PROGRESS NOTES
Pt tolerate infusion well.  Sat in recliner most of infusion in bed for a short part of infusion.  Up to bathroom with assistance several times.  Had ginger ale and crackers.  D/C per ambulation with friend @ 6620.

## 2020-02-14 ENCOUNTER — APPOINTMENT (OUTPATIENT)
Dept: GENERAL RADIOLOGY | Facility: HOSPITAL | Age: 70
End: 2020-02-14

## 2020-02-14 ENCOUNTER — APPOINTMENT (OUTPATIENT)
Dept: CT IMAGING | Facility: HOSPITAL | Age: 70
End: 2020-02-14

## 2020-02-14 ENCOUNTER — HOSPITAL ENCOUNTER (EMERGENCY)
Facility: HOSPITAL | Age: 70
Discharge: HOME | End: 2020-02-15
Attending: EMERGENCY MEDICINE | Admitting: EMERGENCY MEDICINE

## 2020-02-14 DIAGNOSIS — R11.2 NON-INTRACTABLE VOMITING WITH NAUSEA, UNSPECIFIED VOMITING TYPE: ICD-10-CM

## 2020-02-14 DIAGNOSIS — R10.12 COLICKY LUQ ABDOMINAL PAIN: Primary | ICD-10-CM

## 2020-02-14 LAB
ALBUMIN SERPL-MCNC: 4.4 G/DL (ref 3.5–5.2)
ALBUMIN/GLOB SERPL: 1.3 G/DL
ALP SERPL-CCNC: 79 U/L (ref 39–117)
ALT SERPL W P-5'-P-CCNC: 7 U/L (ref 1–33)
ANION GAP SERPL CALCULATED.3IONS-SCNC: 12.3 MMOL/L (ref 5–15)
AST SERPL-CCNC: 13 U/L (ref 1–32)
BACTERIA UR QL AUTO: ABNORMAL /HPF
BASOPHILS # BLD AUTO: 0.03 10*3/MM3 (ref 0–0.2)
BASOPHILS NFR BLD AUTO: 0.4 % (ref 0–1.5)
BILIRUB SERPL-MCNC: 0.5 MG/DL (ref 0.2–1.2)
BILIRUB UR QL STRIP: NEGATIVE
BUN BLD-MCNC: 13 MG/DL (ref 8–23)
BUN/CREAT SERPL: 12 (ref 7–25)
CALCIUM SPEC-SCNC: 9.4 MG/DL (ref 8.6–10.5)
CHLORIDE SERPL-SCNC: 99 MMOL/L (ref 98–107)
CLARITY UR: ABNORMAL
CO2 SERPL-SCNC: 30.7 MMOL/L (ref 22–29)
COLOR UR: YELLOW
CREAT BLD-MCNC: 1.08 MG/DL (ref 0.57–1)
DEPRECATED RDW RBC AUTO: 47.8 FL (ref 37–54)
EOSINOPHIL # BLD AUTO: 0.07 10*3/MM3 (ref 0–0.4)
EOSINOPHIL NFR BLD AUTO: 1 % (ref 0.3–6.2)
ERYTHROCYTE [DISTWIDTH] IN BLOOD BY AUTOMATED COUNT: 15.5 % (ref 12.3–15.4)
GFR SERPL CREATININE-BSD FRML MDRD: 50 ML/MIN/1.73
GLOBULIN UR ELPH-MCNC: 3.4 GM/DL
GLUCOSE BLD-MCNC: 104 MG/DL (ref 65–99)
GLUCOSE UR STRIP-MCNC: NEGATIVE MG/DL
HCT VFR BLD AUTO: 35.4 % (ref 34–46.6)
HGB BLD-MCNC: 12.1 G/DL (ref 12–15.9)
HGB UR QL STRIP.AUTO: ABNORMAL
HOLD SPECIMEN: NORMAL
HOLD SPECIMEN: NORMAL
HYALINE CASTS UR QL AUTO: ABNORMAL /LPF
IMM GRANULOCYTES # BLD AUTO: 0.02 10*3/MM3 (ref 0–0.05)
IMM GRANULOCYTES NFR BLD AUTO: 0.3 % (ref 0–0.5)
KETONES UR QL STRIP: ABNORMAL
LEUKOCYTE ESTERASE UR QL STRIP.AUTO: ABNORMAL
LIPASE SERPL-CCNC: 16 U/L (ref 13–60)
LYMPHOCYTES # BLD AUTO: 1.31 10*3/MM3 (ref 0.7–3.1)
LYMPHOCYTES NFR BLD AUTO: 19 % (ref 19.6–45.3)
MCH RBC QN AUTO: 29.2 PG (ref 26.6–33)
MCHC RBC AUTO-ENTMCNC: 34.2 G/DL (ref 31.5–35.7)
MCV RBC AUTO: 85.3 FL (ref 79–97)
MONOCYTES # BLD AUTO: 0.65 10*3/MM3 (ref 0.1–0.9)
MONOCYTES NFR BLD AUTO: 9.4 % (ref 5–12)
NEUTROPHILS # BLD AUTO: 4.82 10*3/MM3 (ref 1.7–7)
NEUTROPHILS NFR BLD AUTO: 69.9 % (ref 42.7–76)
NITRITE UR QL STRIP: NEGATIVE
NRBC BLD AUTO-RTO: 0 /100 WBC (ref 0–0.2)
PH UR STRIP.AUTO: 6 [PH] (ref 5–8)
PLATELET # BLD AUTO: 304 10*3/MM3 (ref 140–450)
PMV BLD AUTO: 8.4 FL (ref 6–12)
POTASSIUM BLD-SCNC: 3.1 MMOL/L (ref 3.5–5.2)
PROT SERPL-MCNC: 7.8 G/DL (ref 6–8.5)
PROT UR QL STRIP: NEGATIVE
RBC # BLD AUTO: 4.15 10*6/MM3 (ref 3.77–5.28)
RBC # UR: ABNORMAL /HPF
REF LAB TEST METHOD: ABNORMAL
SODIUM BLD-SCNC: 142 MMOL/L (ref 136–145)
SP GR UR STRIP: 1.01 (ref 1–1.03)
SQUAMOUS #/AREA URNS HPF: ABNORMAL /HPF
TROPONIN T SERPL-MCNC: <0.01 NG/ML (ref 0–0.03)
UROBILINOGEN UR QL STRIP: ABNORMAL
WBC NRBC COR # BLD: 6.9 10*3/MM3 (ref 3.4–10.8)
WBC UR QL AUTO: ABNORMAL /HPF
WHOLE BLOOD HOLD SPECIMEN: NORMAL
WHOLE BLOOD HOLD SPECIMEN: NORMAL

## 2020-02-14 PROCEDURE — 84484 ASSAY OF TROPONIN QUANT: CPT

## 2020-02-14 PROCEDURE — 83690 ASSAY OF LIPASE: CPT

## 2020-02-14 PROCEDURE — 96375 TX/PRO/DX INJ NEW DRUG ADDON: CPT

## 2020-02-14 PROCEDURE — 99284 EMERGENCY DEPT VISIT MOD MDM: CPT

## 2020-02-14 PROCEDURE — 74177 CT ABD & PELVIS W/CONTRAST: CPT

## 2020-02-14 PROCEDURE — 25010000002 ONDANSETRON PER 1 MG: Performed by: EMERGENCY MEDICINE

## 2020-02-14 PROCEDURE — 81001 URINALYSIS AUTO W/SCOPE: CPT

## 2020-02-14 PROCEDURE — 85025 COMPLETE CBC W/AUTO DIFF WBC: CPT

## 2020-02-14 PROCEDURE — 93005 ELECTROCARDIOGRAM TRACING: CPT

## 2020-02-14 PROCEDURE — 93010 ELECTROCARDIOGRAM REPORT: CPT | Performed by: INTERNAL MEDICINE

## 2020-02-14 PROCEDURE — 25010000002 MORPHINE PER 10 MG: Performed by: EMERGENCY MEDICINE

## 2020-02-14 PROCEDURE — 25010000002 IOPAMIDOL 61 % SOLUTION: Performed by: EMERGENCY MEDICINE

## 2020-02-14 PROCEDURE — 80053 COMPREHEN METABOLIC PANEL: CPT

## 2020-02-14 PROCEDURE — 96374 THER/PROPH/DIAG INJ IV PUSH: CPT

## 2020-02-14 PROCEDURE — 93005 ELECTROCARDIOGRAM TRACING: CPT | Performed by: EMERGENCY MEDICINE

## 2020-02-14 RX ORDER — ONDANSETRON 2 MG/ML
4 INJECTION INTRAMUSCULAR; INTRAVENOUS ONCE
Status: COMPLETED | OUTPATIENT
Start: 2020-02-14 | End: 2020-02-14

## 2020-02-14 RX ORDER — FAMOTIDINE 10 MG/ML
20 INJECTION, SOLUTION INTRAVENOUS ONCE
Status: COMPLETED | OUTPATIENT
Start: 2020-02-14 | End: 2020-02-14

## 2020-02-14 RX ORDER — MORPHINE SULFATE 2 MG/ML
2 INJECTION, SOLUTION INTRAMUSCULAR; INTRAVENOUS ONCE
Status: COMPLETED | OUTPATIENT
Start: 2020-02-14 | End: 2020-02-14

## 2020-02-14 RX ORDER — SODIUM CHLORIDE 0.9 % (FLUSH) 0.9 %
10 SYRINGE (ML) INJECTION AS NEEDED
Status: DISCONTINUED | OUTPATIENT
Start: 2020-02-14 | End: 2020-02-15 | Stop reason: HOSPADM

## 2020-02-14 RX ADMIN — MORPHINE SULFATE 2 MG: 2 INJECTION, SOLUTION INTRAMUSCULAR; INTRAVENOUS at 22:34

## 2020-02-14 RX ADMIN — FAMOTIDINE 20 MG: 10 INJECTION INTRAVENOUS at 22:34

## 2020-02-14 RX ADMIN — ONDANSETRON 4 MG: 2 INJECTION INTRAMUSCULAR; INTRAVENOUS at 22:34

## 2020-02-14 RX ADMIN — SODIUM CHLORIDE 1000 ML: 9 INJECTION, SOLUTION INTRAVENOUS at 22:34

## 2020-02-14 RX ADMIN — IOPAMIDOL 85 ML: 612 INJECTION, SOLUTION INTRAVENOUS at 23:06

## 2020-02-15 VITALS
TEMPERATURE: 98.5 F | OXYGEN SATURATION: 96 % | SYSTOLIC BLOOD PRESSURE: 129 MMHG | RESPIRATION RATE: 16 BRPM | DIASTOLIC BLOOD PRESSURE: 92 MMHG | HEART RATE: 84 BPM

## 2020-02-15 PROCEDURE — 63710000001 ONDANSETRON ODT 4 MG TABLET DISPERSIBLE: Performed by: EMERGENCY MEDICINE

## 2020-02-15 RX ORDER — ONDANSETRON 4 MG/1
4 TABLET, ORALLY DISINTEGRATING ORAL ONCE
Status: COMPLETED | OUTPATIENT
Start: 2020-02-15 | End: 2020-02-15

## 2020-02-15 RX ORDER — ONDANSETRON HYDROCHLORIDE 8 MG/1
8 TABLET, FILM COATED ORAL EVERY 8 HOURS PRN
Qty: 15 TABLET | Refills: 0 | Status: SHIPPED | OUTPATIENT
Start: 2020-02-15 | End: 2022-10-03 | Stop reason: SDUPTHER

## 2020-02-15 RX ADMIN — ONDANSETRON 4 MG: 4 TABLET, ORALLY DISINTEGRATING ORAL at 00:19

## 2020-02-15 NOTE — ED TRIAGE NOTES
Pt c/o LUQ abd pain, nausea, vomiting, diarrhea, cough that started yesterday. Pt not actively vomiting at this time.

## 2020-02-15 NOTE — ED PROVIDER NOTES
EMERGENCY DEPARTMENT ENCOUNTER    Room Number:  32/32  Date of encounter:  2/15/2020  PCP: Annie Romero MD  Historian: Patient      HPI:  Chief Complaint: Abdominal pain  A complete HPI/ROS/PMH/PSH/SH/FH are unobtainable due to: Nothing    Context: Jaylyn Fuentes is a 69 y.o. female who presents to the ED c/o severe left upper quadrant abdominal pain that started yesterday.  The pain is constant at this point and nonradiating.  At first it was intermittent and less severe, but earlier today it became constant and severe.    Patient said that yesterday when this all started she had quite a bit of vomiting and diarrhea but no fever.  She denies cough.    Patient says she has been so sick that she cannot take any of her medications, and she is never had anything like this happen to her before.      PAST MEDICAL HISTORY  Active Ambulatory Problems     Diagnosis Date Noted   • Osteoarthrosis, localized, primary, shoulder region 02/13/2017   • Hypertension 02/13/2017   • Depressed 02/13/2017   • Status post total replacement of left shoulder 02/13/2017     Resolved Ambulatory Problems     Diagnosis Date Noted   • No Resolved Ambulatory Problems     Past Medical History:   Diagnosis Date   • Arthritis    • GERD (gastroesophageal reflux disease)    • History of transfusion    • Migraine    • PONV (postoperative nausea and vomiting)    • Urinary incontinence    • Wears contact lenses    • Wears dentures    • Wears hearing aid          PAST SURGICAL HISTORY  Past Surgical History:   Procedure Laterality Date   • BACK SURGERY      LUMBAR FUSION    • CERVICAL FUSION      plates, rods, and screws   • CHOLECYSTECTOMY     • COLONOSCOPY      2 YEARS AGO    • EYE SURGERY      LASER FOR KERITONOSIS    • HYSTERECTOMY     • JOINT REPLACEMENT      BILATERAL KNEES, BILATERAL HIPS   • ND RECONSTR TOTAL SHOULDER IMPLANT Left 2/13/2017    Procedure: TOTAL SHOULDER REPLACEMENT LEFT;  Surgeon: Benigno Chavez MD;  Location:   [facility];  Service: Orthopedics   • SHOULDER LIGAMENT REPAIR Right    • TONSILLECTOMY           FAMILY HISTORY  No family history on file.      SOCIAL HISTORY  Social History     Socioeconomic History   • Marital status: Single     Spouse name: Not on file   • Number of children: Not on file   • Years of education: Not on file   • Highest education level: Not on file   Tobacco Use   • Smoking status: Never Smoker   • Smokeless tobacco: Never Used   Substance and Sexual Activity   • Alcohol use: Yes     Comment: [employer] SOCIALLY    • Drug use: No   • Sexual activity: Defer         ALLERGIES  Keflex [cephalexin] and Sulfa antibiotics        REVIEW OF SYSTEMS  Review of Systems     All systems reviewed and negative except for those discussed in HPI.       PHYSICAL EXAM    I have reviewed the triage vital signs and nursing notes.    ED Triage Vitals [02/14/20 1915]   Temp Heart Rate Resp BP SpO2   98.5 °F (36.9 °C) 105 18 130/90 100 %      Temp src Heart Rate Source Patient Position BP Location FiO2 (%)   Tympanic Monitor -- -- --       Physical Exam  GENERAL: Moderate distress, anxious and tremulous  HENT: nares patent, dry mucous membrane  EYES: no scleral icterus  CV: Sinus tachycardia  RESPIRATORY: normal effort  ABDOMEN: soft, mild left upper quadrant tenderness but no rebound or guarding.  No hepatosplenomegaly is palpated, and the CVA is nontender bilaterally  MUSCULOSKELETAL: no deformity  NEURO: alert, moves all extremities, follows commands  SKIN: warm, dry        LAB RESULTS  Recent Results (from the past 24 hour(s))   Comprehensive Metabolic Panel    Collection Time: 02/14/20  8:46 PM   Result Value Ref Range    Glucose 104 (H) 65 - 99 mg/dL    BUN 13 8 - 23 mg/dL    Creatinine 1.08 (H) 0.57 - 1.00 mg/dL    Sodium 142 136 - 145 mmol/L    Potassium 3.1 (L) 3.5 - 5.2 mmol/L    Chloride 99 98 - 107 mmol/L    CO2 30.7 (H) 22.0 - 29.0 mmol/L    Calcium 9.4 8.6 - 10.5 mg/dL    Total Protein 7.8 6.0 - 8.5 g/dL     Albumin 4.40 3.50 - 5.20 g/dL    ALT (SGPT) 7 1 - 33 U/L    AST (SGOT) 13 1 - 32 U/L    Alkaline Phosphatase 79 39 - 117 U/L    Total Bilirubin 0.5 0.2 - 1.2 mg/dL    eGFR Non African Amer 50 (L) >60 mL/min/1.73    Globulin 3.4 gm/dL    A/G Ratio 1.3 g/dL    BUN/Creatinine Ratio 12.0 7.0 - 25.0    Anion Gap 12.3 5.0 - 15.0 mmol/L   Lipase    Collection Time: 02/14/20  8:46 PM   Result Value Ref Range    Lipase 16 13 - 60 U/L   Troponin    Collection Time: 02/14/20  8:46 PM   Result Value Ref Range    Troponin T <0.010 0.000 - 0.030 ng/mL   Urinalysis With Microscopic If Indicated (No Culture) - Urine, Clean Catch    Collection Time: 02/14/20  8:46 PM   Result Value Ref Range    Color, UA Yellow Yellow, Straw    Appearance, UA Cloudy (A) Clear    pH, UA 6.0 5.0 - 8.0    Specific Gravity, UA 1.015 1.005 - 1.030    Glucose, UA Negative Negative    Ketones, UA 15 mg/dL (1+) (A) Negative    Bilirubin, UA Negative Negative    Blood, UA Moderate (2+) (A) Negative    Protein, UA Negative Negative    Leuk Esterase, UA Small (1+) (A) Negative    Nitrite, UA Negative Negative    Urobilinogen, UA 0.2 E.U./dL 0.2 - 1.0 E.U./dL   Light Blue Top    Collection Time: 02/14/20  8:46 PM   Result Value Ref Range    Extra Tube hold for add-on    Green Top (Gel)    Collection Time: 02/14/20  8:46 PM   Result Value Ref Range    Extra Tube Hold for add-ons.    Lavender Top    Collection Time: 02/14/20  8:46 PM   Result Value Ref Range    Extra Tube hold for add-on    Gold Top - SST    Collection Time: 02/14/20  8:46 PM   Result Value Ref Range    Extra Tube Hold for add-ons.    CBC Auto Differential    Collection Time: 02/14/20  8:46 PM   Result Value Ref Range    WBC 6.90 3.40 - 10.80 10*3/mm3    RBC 4.15 3.77 - 5.28 10*6/mm3    Hemoglobin 12.1 12.0 - 15.9 g/dL    Hematocrit 35.4 34.0 - 46.6 %    MCV 85.3 79.0 - 97.0 fL    MCH 29.2 26.6 - 33.0 pg    MCHC 34.2 31.5 - 35.7 g/dL    RDW 15.5 (H) 12.3 - 15.4 %    RDW-SD 47.8 37.0 - 54.0 fl     MPV 8.4 6.0 - 12.0 fL    Platelets 304 140 - 450 10*3/mm3    Neutrophil % 69.9 42.7 - 76.0 %    Lymphocyte % 19.0 (L) 19.6 - 45.3 %    Monocyte % 9.4 5.0 - 12.0 %    Eosinophil % 1.0 0.3 - 6.2 %    Basophil % 0.4 0.0 - 1.5 %    Immature Grans % 0.3 0.0 - 0.5 %    Neutrophils, Absolute 4.82 1.70 - 7.00 10*3/mm3    Lymphocytes, Absolute 1.31 0.70 - 3.10 10*3/mm3    Monocytes, Absolute 0.65 0.10 - 0.90 10*3/mm3    Eosinophils, Absolute 0.07 0.00 - 0.40 10*3/mm3    Basophils, Absolute 0.03 0.00 - 0.20 10*3/mm3    Immature Grans, Absolute 0.02 0.00 - 0.05 10*3/mm3    nRBC 0.0 0.0 - 0.2 /100 WBC   Urinalysis, Microscopic Only - Urine, Clean Catch    Collection Time: 02/14/20  8:46 PM   Result Value Ref Range    RBC, UA 6-12 (A) None Seen, 0-2 /HPF    WBC, UA 3-5 (A) None Seen, 0-2 /HPF    Bacteria, UA None Seen None Seen /HPF    Squamous Epithelial Cells, UA 0-2 None Seen, 0-2 /HPF    Hyaline Casts, UA 0-2 None Seen /LPF    Methodology Manual Light Microscopy        Ordered the above labs and independently reviewed the results.        RADIOLOGY  Ct Abdomen Pelvis With Contrast    Result Date: 2/15/2020  CT OF THE ABDOMEN AND PELVIS WITH CONTRAST  HISTORY: Left upper quadrant pain  COMPARISON: 03/20/2009  TECHNIQUE: Axial CT imaging was obtained from the dome the diaphragm to the symphysis pubis. IV contrast was administered.  FINDINGS: Images through the lung bases demonstrate some bibasilar scarring, with potentially some additional atelectasis noted at the left lung base. The stomach and duodenum appear unremarkable, as are the adrenal glands. No focal hepatic lesions are seen. There is dilatation of the common bile duct, measuring up to 1.4 cm. This was present in 2009, but has increased, and there is increasing intrahepatic biliary dilatation as well. Some of the appearance may be postcholecystectomy nature, but correlation with liver function tests is recommended. MRCP or ERCP could be considered for additional  evaluation, if these are abnormal. The patient does have some pancreatic atrophy. Assessment of the kidneys is somewhat limited due to streak artifact from spinal hardware. Kidneys appear to enhance symmetrically, and there is no hydronephrosis. The pelvis is not well assessed, due to streak artifact from bilateral hip arthroplasties. Urinary bladder appears grossly unremarkable. The patient has no evidence of mechanical bowel obstruction. I do question a mildly thick-walled appearance to the splenic flexure of the colon. This may represent colitis, especially at the given the patient's history of left upper quadrant pain. No pneumatosis or free air is seen. There is no evidence of obstruction. The appendix is not well-seen on these images, but there is no evidence of appendicitis. There is calcification of the abdominal aorta. Extensive spinal fusion hardware is seen. No acute osseous abnormalities are identified.       1. Mildly thick-walled appearance to the patient's splenic flexure of the colon. This may represent colitis, given history. There is no evidence of obstruction. No pneumatosis, free air, or free fluid is seen. 2. Increasing intra and extrahepatic biliary dilatation when compared to the prior study. Some of the appearance may be postcholecystectomy nature, but correlation with liver function tests is recommended. If these are abnormal, MRCP or ERCP could be considered for additional evaluation.  Radiation dose reduction techniques were utilized, including automated exposure control and exposure modulation based on body size.  This report was finalized on 2/15/2020 12:01 AM by Dr. Saba Armijo M.D.        I ordered the above noted radiological studies. Reviewed by me and discussed with radiologist.  See dictation for official radiology interpretation.      PROCEDURES    Procedures      MEDICATIONS GIVEN IN ER    Medications   sodium chloride 0.9 % flush 10 mL (has no administration in time range)    sodium chloride 0.9 % bolus 1,000 mL (0 mL Intravenous Stopped 2/15/20 0025)   ondansetron (ZOFRAN) injection 4 mg (4 mg Intravenous Given 2/14/20 2234)   famotidine (PEPCID) injection 20 mg (20 mg Intravenous Given 2/14/20 2234)   morphine injection 2 mg (2 mg Intravenous Given 2/14/20 2234)   iopamidol (ISOVUE-300) 61 % injection 100 mL (85 mL Intravenous Given by Other 2/14/20 2306)   ondansetron ODT (ZOFRAN-ODT) disintegrating tablet 4 mg (4 mg Oral Given 2/15/20 0019)         PROGRESS, DATA ANALYSIS, CONSULTS, AND MEDICAL DECISION MAKING    All labs have been independently reviewed by me.  All radiology studies have been reviewed by me and discussed with radiologist dictating the report.   EKG's independently viewed and interpreted by me.  Discussion below represents my analysis of pertinent findings related to patient's condition, differential diagnosis, treatment plan and final disposition.        ED Course as of Feb 15 0212   Sat Feb 15, 2020   0210 CBC is unremarkable, chemistry shows mild hypokalemia and a few ketones in the urine    [DP]   0210 Lipase and troponin are negative    [DP]   0211 EKG at 1936  Sinus tachycardia at 107  Normal CA, QRS and QT  No acute ST segment elevations or depressions and this is unchanged when compared to February 12, 2017    [DP]   0211 Patient was given IV morphine, Zofran and Pepcid.  She was also given a liter of normal saline.    [DP]   0211 CT scan of the abdomen pelvis was negative except for small area of localized inflammation in the wall of the splenic flexure.  There is no pneumatosis, significant stranding or free air.  This would go along with the symptoms she has been having.  Based on the evaluation I would not think this is bacterial in any way.  We will just treat her symptomatically at home    [DP]   0211 On re-eval prior to discharge, the patient felt much better, she was no longer tremulous and anxious.  Her pain was much improved and she was no longer  nauseated.  Her abdomen was nontender to palpation on exam    [DP]      ED Course User Index  [DP] Deepak Sheriff MD           AS OF 2:12 AM VITALS:    BP - 129/92  HR - 84  TEMP - 98.5 °F (36.9 °C) (Tympanic)  O2 SATS - 96%        DIAGNOSIS  Final diagnoses:   Colicky LUQ abdominal pain   Non-intractable vomiting with nausea, unspecified vomiting type         DISPOSITION  Discharge           Deepak Sheriff MD  02/15/20 0217

## 2020-06-06 ENCOUNTER — HOSPITAL ENCOUNTER (EMERGENCY)
Facility: HOSPITAL | Age: 70
Discharge: HOME | End: 2020-06-06
Attending: EMERGENCY MEDICINE | Admitting: EMERGENCY MEDICINE

## 2020-06-06 VITALS
OXYGEN SATURATION: 93 % | TEMPERATURE: 99.7 F | BODY MASS INDEX: 33.29 KG/M2 | WEIGHT: 195 LBS | RESPIRATION RATE: 16 BRPM | SYSTOLIC BLOOD PRESSURE: 140 MMHG | HEIGHT: 64 IN | DIASTOLIC BLOOD PRESSURE: 81 MMHG | HEART RATE: 85 BPM

## 2020-06-06 DIAGNOSIS — S81.811A LACERATION OF RIGHT LOWER LEG, INITIAL ENCOUNTER: Primary | ICD-10-CM

## 2020-06-06 PROCEDURE — 90715 TDAP VACCINE 7 YRS/> IM: CPT | Performed by: EMERGENCY MEDICINE

## 2020-06-06 PROCEDURE — 25010000002 TDAP 5-2.5-18.5 LF-MCG/0.5 SUSPENSION: Performed by: EMERGENCY MEDICINE

## 2020-06-06 PROCEDURE — 99283 EMERGENCY DEPT VISIT LOW MDM: CPT

## 2020-06-06 PROCEDURE — 90471 IMMUNIZATION ADMIN: CPT | Performed by: EMERGENCY MEDICINE

## 2020-06-06 RX ORDER — LIDOCAINE HYDROCHLORIDE AND EPINEPHRINE 10; 10 MG/ML; UG/ML
10 INJECTION, SOLUTION INFILTRATION; PERINEURAL ONCE
Status: DISCONTINUED | OUTPATIENT
Start: 2020-06-06 | End: 2020-06-06 | Stop reason: HOSPADM

## 2020-06-06 RX ADMIN — TETANUS TOXOID, REDUCED DIPHTHERIA TOXOID AND ACELLULAR PERTUSSIS VACCINE, ADSORBED 0.5 ML: 5; 2.5; 8; 8; 2.5 SUSPENSION INTRAMUSCULAR at 17:15

## 2021-07-13 ENCOUNTER — TRANSCRIBE ORDERS (OUTPATIENT)
Dept: PHYSICAL THERAPY | Facility: CLINIC | Age: 71
End: 2021-07-13

## 2021-07-13 DIAGNOSIS — G89.18 POST-OPERATIVE PAIN: ICD-10-CM

## 2021-07-13 DIAGNOSIS — M43.10 DEGENERATIVE SPONDYLOLISTHESIS: ICD-10-CM

## 2021-07-13 DIAGNOSIS — Z98.1 S/P LUMBAR FUSION: Primary | ICD-10-CM

## 2021-07-13 DIAGNOSIS — R26.81 UNSTABLE GAIT: ICD-10-CM

## 2021-07-13 DIAGNOSIS — M54.16 RADICULOPATHY, LUMBAR REGION: ICD-10-CM

## 2021-09-15 ENCOUNTER — APPOINTMENT (OUTPATIENT)
Dept: CT IMAGING | Facility: HOSPITAL | Age: 71
End: 2021-09-15

## 2021-09-15 ENCOUNTER — APPOINTMENT (OUTPATIENT)
Dept: GENERAL RADIOLOGY | Facility: HOSPITAL | Age: 71
End: 2021-09-15

## 2021-09-15 ENCOUNTER — HOSPITAL ENCOUNTER (EMERGENCY)
Facility: HOSPITAL | Age: 71
Discharge: HOME | End: 2021-09-15
Attending: EMERGENCY MEDICINE | Admitting: EMERGENCY MEDICINE

## 2021-09-15 VITALS
BODY MASS INDEX: 36.14 KG/M2 | OXYGEN SATURATION: 98 % | SYSTOLIC BLOOD PRESSURE: 145 MMHG | RESPIRATION RATE: 20 BRPM | HEART RATE: 81 BPM | HEIGHT: 63 IN | DIASTOLIC BLOOD PRESSURE: 90 MMHG | TEMPERATURE: 98.2 F | WEIGHT: 204 LBS

## 2021-09-15 DIAGNOSIS — R10.84 GENERALIZED ABDOMINAL PAIN: ICD-10-CM

## 2021-09-15 DIAGNOSIS — K29.80 DUODENITIS: Primary | ICD-10-CM

## 2021-09-15 DIAGNOSIS — R07.89 ATYPICAL CHEST PAIN: ICD-10-CM

## 2021-09-15 LAB
ALBUMIN SERPL-MCNC: 4.6 G/DL (ref 3.5–5.2)
ALBUMIN/GLOB SERPL: 1.4 G/DL
ALP SERPL-CCNC: 89 U/L (ref 39–117)
ALT SERPL W P-5'-P-CCNC: 19 U/L (ref 1–33)
ANION GAP SERPL CALCULATED.3IONS-SCNC: 13.9 MMOL/L (ref 5–15)
AST SERPL-CCNC: 27 U/L (ref 1–32)
BACTERIA UR QL AUTO: NORMAL /HPF
BASOPHILS # BLD AUTO: 0.04 10*3/MM3 (ref 0–0.2)
BASOPHILS NFR BLD AUTO: 0.4 % (ref 0–1.5)
BILIRUB SERPL-MCNC: 0.7 MG/DL (ref 0–1.2)
BILIRUB UR QL STRIP: NEGATIVE
BUN SERPL-MCNC: 18 MG/DL (ref 8–23)
BUN/CREAT SERPL: 17.8 (ref 7–25)
CALCIUM SPEC-SCNC: 9.5 MG/DL (ref 8.6–10.5)
CHLORIDE SERPL-SCNC: 97 MMOL/L (ref 98–107)
CLARITY UR: CLEAR
CO2 SERPL-SCNC: 28.1 MMOL/L (ref 22–29)
COLOR UR: ABNORMAL
CREAT SERPL-MCNC: 1.01 MG/DL (ref 0.57–1)
DEPRECATED RDW RBC AUTO: 44.4 FL (ref 37–54)
EOSINOPHIL # BLD AUTO: 0.03 10*3/MM3 (ref 0–0.4)
EOSINOPHIL NFR BLD AUTO: 0.3 % (ref 0.3–6.2)
ERYTHROCYTE [DISTWIDTH] IN BLOOD BY AUTOMATED COUNT: 14.1 % (ref 12.3–15.4)
GFR SERPL CREATININE-BSD FRML MDRD: 54 ML/MIN/1.73
GLOBULIN UR ELPH-MCNC: 3.2 GM/DL
GLUCOSE SERPL-MCNC: 108 MG/DL (ref 65–99)
GLUCOSE UR STRIP-MCNC: NEGATIVE MG/DL
HCT VFR BLD AUTO: 41.1 % (ref 34–46.6)
HGB BLD-MCNC: 14.5 G/DL (ref 12–15.9)
HGB UR QL STRIP.AUTO: ABNORMAL
HYALINE CASTS UR QL AUTO: NORMAL /LPF
IMM GRANULOCYTES # BLD AUTO: 0.04 10*3/MM3 (ref 0–0.05)
IMM GRANULOCYTES NFR BLD AUTO: 0.4 % (ref 0–0.5)
KETONES UR QL STRIP: ABNORMAL
LEUKOCYTE ESTERASE UR QL STRIP.AUTO: ABNORMAL
LYMPHOCYTES # BLD AUTO: 1.61 10*3/MM3 (ref 0.7–3.1)
LYMPHOCYTES NFR BLD AUTO: 17.1 % (ref 19.6–45.3)
MAGNESIUM SERPL-MCNC: 1.9 MG/DL (ref 1.6–2.4)
MCH RBC QN AUTO: 30.7 PG (ref 26.6–33)
MCHC RBC AUTO-ENTMCNC: 35.3 G/DL (ref 31.5–35.7)
MCV RBC AUTO: 87.1 FL (ref 79–97)
MONOCYTES # BLD AUTO: 0.83 10*3/MM3 (ref 0.1–0.9)
MONOCYTES NFR BLD AUTO: 8.8 % (ref 5–12)
NEUTROPHILS NFR BLD AUTO: 6.88 10*3/MM3 (ref 1.7–7)
NEUTROPHILS NFR BLD AUTO: 73 % (ref 42.7–76)
NITRITE UR QL STRIP: NEGATIVE
NRBC BLD AUTO-RTO: 0 /100 WBC (ref 0–0.2)
PH UR STRIP.AUTO: 6 [PH] (ref 5–8)
PLATELET # BLD AUTO: 366 10*3/MM3 (ref 140–450)
PMV BLD AUTO: 8.8 FL (ref 6–12)
POTASSIUM SERPL-SCNC: 3.1 MMOL/L (ref 3.5–5.2)
PROT SERPL-MCNC: 7.8 G/DL (ref 6–8.5)
PROT UR QL STRIP: ABNORMAL
RBC # BLD AUTO: 4.72 10*6/MM3 (ref 3.77–5.28)
RBC # UR: NORMAL /HPF
REF LAB TEST METHOD: NORMAL
SARS-COV-2 RNA RESP QL NAA+PROBE: NOT DETECTED
SODIUM SERPL-SCNC: 139 MMOL/L (ref 136–145)
SP GR UR STRIP: 1.03 (ref 1–1.03)
SQUAMOUS #/AREA URNS HPF: NORMAL /HPF
TROPONIN T SERPL-MCNC: <0.01 NG/ML (ref 0–0.03)
UROBILINOGEN UR QL STRIP: ABNORMAL
WBC # BLD AUTO: 9.43 10*3/MM3 (ref 3.4–10.8)
WBC UR QL AUTO: NORMAL /HPF

## 2021-09-15 PROCEDURE — 81001 URINALYSIS AUTO W/SCOPE: CPT | Performed by: NURSE PRACTITIONER

## 2021-09-15 PROCEDURE — 80053 COMPREHEN METABOLIC PANEL: CPT | Performed by: NURSE PRACTITIONER

## 2021-09-15 PROCEDURE — 93010 ELECTROCARDIOGRAM REPORT: CPT | Performed by: INTERNAL MEDICINE

## 2021-09-15 PROCEDURE — 74177 CT ABD & PELVIS W/CONTRAST: CPT

## 2021-09-15 PROCEDURE — 25010000002 IOPAMIDOL 61 % SOLUTION: Performed by: EMERGENCY MEDICINE

## 2021-09-15 PROCEDURE — 93005 ELECTROCARDIOGRAM TRACING: CPT

## 2021-09-15 PROCEDURE — P9612 CATHETERIZE FOR URINE SPEC: HCPCS

## 2021-09-15 PROCEDURE — 99284 EMERGENCY DEPT VISIT MOD MDM: CPT

## 2021-09-15 PROCEDURE — 85025 COMPLETE CBC W/AUTO DIFF WBC: CPT | Performed by: NURSE PRACTITIONER

## 2021-09-15 PROCEDURE — U0003 INFECTIOUS AGENT DETECTION BY NUCLEIC ACID (DNA OR RNA); SEVERE ACUTE RESPIRATORY SYNDROME CORONAVIRUS 2 (SARS-COV-2) (CORONAVIRUS DISEASE [COVID-19]), AMPLIFIED PROBE TECHNIQUE, MAKING USE OF HIGH THROUGHPUT TECHNOLOGIES AS DESCRIBED BY CMS-2020-01-R: HCPCS | Performed by: NURSE PRACTITIONER

## 2021-09-15 PROCEDURE — 71045 X-RAY EXAM CHEST 1 VIEW: CPT

## 2021-09-15 PROCEDURE — 83735 ASSAY OF MAGNESIUM: CPT | Performed by: NURSE PRACTITIONER

## 2021-09-15 PROCEDURE — 84484 ASSAY OF TROPONIN QUANT: CPT | Performed by: NURSE PRACTITIONER

## 2021-09-15 RX ORDER — SUCRALFATE ORAL 1 G/10ML
1 SUSPENSION ORAL
Qty: 420 ML | Refills: 0 | Status: SHIPPED | OUTPATIENT
Start: 2021-09-15 | End: 2021-09-27

## 2021-09-15 RX ORDER — SODIUM CHLORIDE 0.9 % (FLUSH) 0.9 %
10 SYRINGE (ML) INJECTION AS NEEDED
Status: DISCONTINUED | OUTPATIENT
Start: 2021-09-15 | End: 2021-09-15 | Stop reason: HOSPADM

## 2021-09-15 RX ADMIN — SODIUM CHLORIDE 1000 ML: 9 INJECTION, SOLUTION INTRAVENOUS at 13:39

## 2021-09-15 RX ADMIN — IOPAMIDOL 85 ML: 612 INJECTION, SOLUTION INTRAVENOUS at 15:48

## 2021-09-16 LAB — QT INTERVAL: 362 MS

## 2021-09-21 NOTE — PROGRESS NOTES
"Chief Complaint   Patient presents with   • Abdominal Pain     See ER note           History of Present Illness  Patient is a 71-year-old female who presents today for evaluation. She was recently seen in the emergency department for abdominal pain, diarrhea, nausea.  CT scan was performed and showed duodenitis.    Patient presents today for follow-up.  She reports she woke up with acute onset of symptoms on September 11, 2021.  She may experiencing right-sided abdominal pain, chest pain, nausea, vomiting, and diarrhea.  Symptoms persisted for several days.  She ultimately was evaluated in the ER where the CT scan showed duodenitis.  She was given a prescription for sucralfate.    Patient reports since that time her symptoms have resolved.  She is no longer experiencing abdominal pain, nausea, or vomiting.    She reports a longstanding history of reflux, continues on Dexilant for this.  Reports symptoms are controlled.    She reports intermittent issues with diarrhea for which she will use Lomotil on an as-needed basis.  Denies any bleeding.    She is uncertain when her last colonoscopy was but believes she is up-to-date.         Result Review :       CBC & Differential (09/15/2021 13:30)   Comprehensive Metabolic Panel (09/15/2021 13:30)   CT Abdomen Pelvis With Contrast (09/15/2021 15:48)   ED with Yao Larkin MD (09/15/2021)         Vital Signs:   /88 (BP Location: Left arm, Patient Position: Sitting, Cuff Size: Adult)   Pulse 83   Temp 97.6 °F (36.4 °C) (Temporal)   Ht 160 cm (63\")   Wt 98.5 kg (217 lb 3.2 oz)   SpO2 97%   BMI 38.48 kg/m²     Body mass index is 38.48 kg/m².     Physical Exam  Vitals reviewed.   Constitutional:       General: She is not in acute distress.     Appearance: She is well-developed.   HENT:      Head: Normocephalic and atraumatic.   Pulmonary:      Effort: Pulmonary effort is normal. No respiratory distress.   Abdominal:      General: Abdomen is flat. Bowel sounds are " normal. There is no distension.      Palpations: Abdomen is soft.      Tenderness: There is no abdominal tenderness.   Skin:     General: Skin is dry.      Coloration: Skin is not pale.   Neurological:      Mental Status: She is alert and oriented to person, place, and time.   Psychiatric:         Thought Content: Thought content normal.           Assessment and Plan    Diagnoses and all orders for this visit:    1. Abnormal CT scan, small bowel (Primary)    2. Duodenitis    3. Nausea and vomiting, intractability of vomiting not specified, unspecified vomiting type    4. Diarrhea, unspecified type         Discussion  patient presents today for follow-up after recent ER visit with CT scan showing duodenitis.  Recommended EGD for further evaluation which we will schedule today.  We will obtain her previous colonoscopy for review and if she is due for updated colonoscopy, plan for colonoscopy to be performed at time of EGD.          Follow Up   Return for Follow up to review results after testing complete.    Patient Instructions   Schedule EGD for further evaluation of symptoms.

## 2021-09-27 ENCOUNTER — OFFICE VISIT (OUTPATIENT)
Dept: GASTROENTEROLOGY | Facility: CLINIC | Age: 71
End: 2021-09-27

## 2021-09-27 ENCOUNTER — PREP FOR SURGERY (OUTPATIENT)
Dept: SURGERY | Facility: SURGERY CENTER | Age: 71
End: 2021-09-27

## 2021-09-27 ENCOUNTER — TRANSCRIBE ORDERS (OUTPATIENT)
Dept: LAB | Facility: SURGERY CENTER | Age: 71
End: 2021-09-27

## 2021-09-27 VITALS
SYSTOLIC BLOOD PRESSURE: 143 MMHG | WEIGHT: 217.2 LBS | HEART RATE: 83 BPM | HEIGHT: 63 IN | OXYGEN SATURATION: 97 % | TEMPERATURE: 97.6 F | BODY MASS INDEX: 38.48 KG/M2 | DIASTOLIC BLOOD PRESSURE: 88 MMHG

## 2021-09-27 DIAGNOSIS — R93.3 ABNORMAL CT SCAN, SMALL BOWEL: ICD-10-CM

## 2021-09-27 DIAGNOSIS — K29.80 DUODENITIS: Primary | ICD-10-CM

## 2021-09-27 DIAGNOSIS — R11.2 NAUSEA AND VOMITING, INTRACTABILITY OF VOMITING NOT SPECIFIED, UNSPECIFIED VOMITING TYPE: ICD-10-CM

## 2021-09-27 DIAGNOSIS — R19.7 DIARRHEA, UNSPECIFIED TYPE: ICD-10-CM

## 2021-09-27 DIAGNOSIS — Z01.818 OTHER SPECIFIED PRE-OPERATIVE EXAMINATION: Primary | ICD-10-CM

## 2021-09-27 DIAGNOSIS — K29.80 DUODENITIS: ICD-10-CM

## 2021-09-27 DIAGNOSIS — R93.3 ABNORMAL CT SCAN, SMALL BOWEL: Primary | ICD-10-CM

## 2021-09-27 PROCEDURE — 99203 OFFICE O/P NEW LOW 30 MIN: CPT | Performed by: NURSE PRACTITIONER

## 2021-09-27 RX ORDER — SODIUM CHLORIDE 0.9 % (FLUSH) 0.9 %
3 SYRINGE (ML) INJECTION EVERY 12 HOURS SCHEDULED
Status: CANCELLED | OUTPATIENT
Start: 2021-09-27

## 2021-09-27 RX ORDER — ERGOCALCIFEROL 1.25 MG/1
CAPSULE ORAL
Status: ON HOLD | COMMUNITY
Start: 2021-06-24 | End: 2021-10-14

## 2021-09-27 RX ORDER — SODIUM CHLORIDE, SODIUM LACTATE, POTASSIUM CHLORIDE, CALCIUM CHLORIDE 600; 310; 30; 20 MG/100ML; MG/100ML; MG/100ML; MG/100ML
30 INJECTION, SOLUTION INTRAVENOUS CONTINUOUS PRN
Status: CANCELLED | OUTPATIENT
Start: 2021-09-27

## 2021-09-27 RX ORDER — ALBUTEROL SULFATE 90 UG/1
2 AEROSOL, METERED RESPIRATORY (INHALATION) EVERY 6 HOURS PRN
COMMUNITY
Start: 2021-04-07 | End: 2022-05-03

## 2021-09-27 RX ORDER — SODIUM CHLORIDE 0.9 % (FLUSH) 0.9 %
10 SYRINGE (ML) INJECTION AS NEEDED
Status: CANCELLED | OUTPATIENT
Start: 2021-09-27

## 2021-09-27 RX ORDER — DEXLANSOPRAZOLE 60 MG/1
1 CAPSULE, DELAYED RELEASE ORAL DAILY
COMMUNITY
End: 2022-10-09 | Stop reason: HOSPADM

## 2021-09-27 RX ORDER — CYCLOBENZAPRINE HCL 10 MG
TABLET ORAL DAILY PRN
COMMUNITY
Start: 2021-08-03 | End: 2021-10-26 | Stop reason: HOSPADM

## 2021-09-27 RX ORDER — HYDROCODONE BITARTRATE AND ACETAMINOPHEN 10; 325 MG/1; MG/1
1 TABLET ORAL EVERY 8 HOURS PRN
COMMUNITY
End: 2022-04-11 | Stop reason: HOSPADM

## 2021-09-27 RX ORDER — CHLORDIAZEPOXIDE AND AMITRIPTYLINE HYDROCHLORIDE 10; 27.98 MG/1; MG/1
TABLET, FILM COATED ORAL
Status: ON HOLD | COMMUNITY
Start: 2021-08-20 | End: 2021-10-14

## 2021-09-30 ENCOUNTER — OFFICE VISIT (OUTPATIENT)
Dept: SLEEP MEDICINE | Facility: HOSPITAL | Age: 71
End: 2021-09-30

## 2021-09-30 VITALS
BODY MASS INDEX: 38.98 KG/M2 | OXYGEN SATURATION: 96 % | HEIGHT: 63 IN | DIASTOLIC BLOOD PRESSURE: 94 MMHG | HEART RATE: 90 BPM | WEIGHT: 220 LBS | SYSTOLIC BLOOD PRESSURE: 148 MMHG

## 2021-09-30 DIAGNOSIS — R41.3 POOR MEMORY: ICD-10-CM

## 2021-09-30 DIAGNOSIS — G47.10 HYPERSOMNIA: ICD-10-CM

## 2021-09-30 DIAGNOSIS — R06.83 SNORING: ICD-10-CM

## 2021-09-30 DIAGNOSIS — G47.30 OBSERVED SLEEP APNEA: Primary | ICD-10-CM

## 2021-09-30 DIAGNOSIS — G47.8 NON-RESTORATIVE SLEEP: ICD-10-CM

## 2021-09-30 DIAGNOSIS — E66.9 CLASS 2 OBESITY: ICD-10-CM

## 2021-09-30 DIAGNOSIS — I10 ESSENTIAL HYPERTENSION: Chronic | ICD-10-CM

## 2021-09-30 PROBLEM — E66.812 CLASS 2 OBESITY: Status: ACTIVE | Noted: 2021-09-30

## 2021-09-30 PROCEDURE — 99204 OFFICE O/P NEW MOD 45 MIN: CPT | Performed by: INTERNAL MEDICINE

## 2021-09-30 PROCEDURE — G0463 HOSPITAL OUTPT CLINIC VISIT: HCPCS

## 2021-09-30 NOTE — PROGRESS NOTES
[facility]  [address] [facility]  [address] [place] [address]  Phone   Fax       Jaylyn Fuentes  1950  71 y.o.  female      PCP:Annie Romero MD    Type of service: Initial New Patient Office Visit  Date of service: 9/30/2021    Chief Complaint   Patient presents with   • Witnessed Apnea   • Snoring   • Fatigue   • Non-restorative Sleep   • Daytime Sleepiness   • Obesity       History of present illness;  Jaylyn Fuentes is a new patient for me and was seen today for sleep related problems of snoring, nonrestorative sleep and witnessed apneas. The symptoms are present for 15 to 20 years and they are persistent in nature.  The snoring is present in all positions and it is loud.  Has  history of prior sleep evaluation and sleep studies very long time ago  She lives with her friend who is accompanying her today. She tells me that she snores a lot and stops breathing as abnormal sleep pattern.  She can sleep for about 10 to 12 hours and still be sleepy in the daytime and she is tired exhausted.    Patient gives the following sleep history.  Sleep schedule:  Bedtime: 11PM  Wake time: 11 AM  Normally takes about 30-60 minutes to fall asleep  Average hours of sleep 10-12  Number of naps per day 1 or 2  Symptoms  In addition to snoring, nonrestorative sleep and witnessed apneas patient gives the following associated symptoms.  Have you ever awakened gasping for breath, coughing, choking: Yes   Change in weight,  Yes and about 50 pounds  Morning headaches  Yes   Awaken with a sore throat or dry mouth  No   Leg jerking at night:  No   Crawly feeling/urge sensation to move in the legs: No   Teeth grinding:Yes   Have you ever awakened at night with a sour taste or burning sensation in your chest:  No   Do you have muscle weakness with laughing or anger or sleep paralysis:  No   Have you ever felt paralyzed while going to sleep or waking up:  No   Sleepwalking,  "nightmares, No   Nocturia (urination at night): 3 times per night  Memory Problem:Yes     Past medical history: (Relevant to sleep medicine)  1. Hypertension  2. Poor memory  3. Hyperlipidemia  4. Arthritis    • Medications are reviewed by me and documented in the encounter  • Allergies reviewed and documented in encounter    Social history:  Do you drive a commercial vehicle:  No   Shift work:  No   Tobacco use:  No   Alcohol use:  2 per week  Caffeinated drinks: 3    FAMILY HISTORY (Your mother, father, brothers and sisters)  1. Sleep apnea, brother  2. Hypertension  3. Heart disease    REVIEW OF SYSTEMS.  Full review of systems available on the intake form which is scanned in the media tab.  The relevant positive are noted below  1. Daytime excessive sleepiness with [place] Sleepiness Scale :Total score: 6   2. Snoring  3. Nonrestorative sleep  4. Recurrent urination  5. Anxiety  6. Depression  7. Frequent heartburn      Physical exam:  Vitals:    09/30/21 1100   BP: 148/94   Pulse: 90   SpO2: 96%   Weight: 99.8 kg (220 lb)   Height: 160 cm (63\")    Body mass index is 38.97 kg/m². Neck Circumference: 16 inches  HEENT: Head is atraumatic, normocephalic  Eyes: pupils are round equal and reacting to light and accommodation, conjunctiva normal  Nose: no nasal septal defects or deviation and the nasal passages are clear, no nasal polyps,  Throat: tonsils are not enlarged, tongue normal, oral airway Mallampati class 3  NECK:Neck Circumference: 16 inches, trachea is in the midline, thyroid not enlarged  RESPIRATORY SYSTEM: Breath sounds are equal on both sides, there are no wheezes   CARDIOVASULAR SYSTEM: Heart sounds are regular rhythm and ava rate, no edema  EXTREMITES: No cyanosis, clubbing  NEUROLOGICAL SYSTEM: Oriented x 3, no gross motor defects, gait normal    I have reviewed the office notes from Dr. Annie Romero's office which has been faxed to me.  The office note is dated September 2, 2021    Assessment " and plan:  · Witnessed apnea (R06.81) patient's symptoms and examination is consistent with sleep apnea (G47.30)  I have talked to the patient about the signs and symptoms of sleep apnea. In addition, I have also discussed pathophysiology of sleep apnea.  I also discussed the complications of untreated sleep apnea including effects on hypertension, diabetes mellitus and nonrestorative sleep with hypersomnia which can increase risk for motor vehicle accidents.  Untreated sleep apnea is also a risk factor for development of atrial fibrillation, pulmonary hypertension and stroke.  Discussed in detail of various testing methods including home-based and lab based sleep studies.  Based on history and physical examination the most appropriate study is split-night study.  The order for the sleep study is placed in [facility].  The test will be scheduled after approval from insurance. Treatment and management will be discussed after the test is completed.  Patient was given opportunity to ask questions and all the questions were answered.   · Snoring (R06.83) snoring is the sound created by turbulent airflow vibrating upper airway soft tissue.  I have also discussed factors affecting snoring including sleep deprivation, sleeping on the back and alcohol ingestion. To minimize snoring, patient is advised to have adequate sleep, sleep on the side and avoid alcohol and sedative medications before bedtime  · Daytime excessive sleepiness .  It was assessed with Kiester Sleepiness Scale of Total score: 6.  There are many causes for daytime excessive sleepiness including sleep depression, shiftwork syndrome, depression and other medical disorders including heart, kidney and liver failure.  The most serious cause of excessive sleepiness is due to neurological conditions like narcolepsy/cataplexy.  But the most common cause of excessive sleepiness is due to sleep apnea with frequent awakenings during sleep time.  I have discussed safety of  driving and to remain vigilant while driving.  · Obesity class 2, with BMI Body mass index is 38.97 kg/m².. I have discussed the relationship between weight and sleep apnea.There is direct correlation between weight and severity of sleep apnea.  Weight reduction is encouraged, as it is going to reduce the severity of sleep apnea. I have also discussed with the patient diet and exercise to achieve ideal body weight    I have also discussed with the patient the following  • Sleep hygiene: Maintaining a regular bedtime and wake time, not to watch television or work in bed, limit caffeine-containing beverages before bed time and avoid naps during the day  • Adequate amount of sleep.  Generally most people needs about 7 to 8 hours of sleep.  • Return for 31 to 90 days after PAP setup with down load..  Patient's questions were answered.        Murphy Clark MD  Sleep Medicine.  Medical Director, [facility] sleep centers  9/30/2021 ,

## 2021-10-12 ENCOUNTER — LAB (OUTPATIENT)
Dept: LAB | Facility: SURGERY CENTER | Age: 71
End: 2021-10-12

## 2021-10-12 DIAGNOSIS — Z01.818 OTHER SPECIFIED PRE-OPERATIVE EXAMINATION: ICD-10-CM

## 2021-10-12 LAB — SARS-COV-2 ORF1AB RESP QL NAA+PROBE: NOT DETECTED

## 2021-10-12 PROCEDURE — C9803 HOPD COVID-19 SPEC COLLECT: HCPCS

## 2021-10-12 PROCEDURE — U0004 COV-19 TEST NON-CDC HGH THRU: HCPCS | Performed by: SURGERY

## 2021-10-14 ENCOUNTER — ANESTHESIA EVENT (OUTPATIENT)
Dept: SURGERY | Facility: SURGERY CENTER | Age: 71
End: 2021-10-14

## 2021-10-14 ENCOUNTER — HOSPITAL ENCOUNTER (OUTPATIENT)
Facility: SURGERY CENTER | Age: 71
Setting detail: HOSPITAL OUTPATIENT SURGERY
Discharge: HOME | End: 2021-10-14
Attending: INTERNAL MEDICINE | Admitting: INTERNAL MEDICINE

## 2021-10-14 ENCOUNTER — ANESTHESIA (OUTPATIENT)
Dept: SURGERY | Facility: SURGERY CENTER | Age: 71
End: 2021-10-14

## 2021-10-14 VITALS
OXYGEN SATURATION: 95 % | BODY MASS INDEX: 37.82 KG/M2 | TEMPERATURE: 97.8 F | DIASTOLIC BLOOD PRESSURE: 89 MMHG | HEART RATE: 72 BPM | SYSTOLIC BLOOD PRESSURE: 135 MMHG | RESPIRATION RATE: 16 BRPM | WEIGHT: 213.5 LBS

## 2021-10-14 DIAGNOSIS — K29.80 DUODENITIS: ICD-10-CM

## 2021-10-14 DIAGNOSIS — R93.3 ABNORMAL CT SCAN, SMALL BOWEL: ICD-10-CM

## 2021-10-14 DIAGNOSIS — R11.2 NAUSEA AND VOMITING, INTRACTABILITY OF VOMITING NOT SPECIFIED, UNSPECIFIED VOMITING TYPE: ICD-10-CM

## 2021-10-14 LAB — UREASE TISS QL: POSITIVE

## 2021-10-14 PROCEDURE — 88305 TISSUE EXAM BY PATHOLOGIST: CPT | Performed by: INTERNAL MEDICINE

## 2021-10-14 PROCEDURE — 0DB98ZX EXCISION OF DUODENUM, VIA NATURAL OR ARTIFICIAL OPENING ENDOSCOPIC, DIAGNOSTIC: ICD-10-PCS | Performed by: NURSE PRACTITIONER

## 2021-10-14 PROCEDURE — 25010000002 PROPOFOL 10 MG/ML EMULSION: Performed by: ANESTHESIOLOGY

## 2021-10-14 PROCEDURE — 43239 EGD BIOPSY SINGLE/MULTIPLE: CPT | Performed by: INTERNAL MEDICINE

## 2021-10-14 PROCEDURE — 87081 CULTURE SCREEN ONLY: CPT | Performed by: INTERNAL MEDICINE

## 2021-10-14 RX ORDER — SODIUM CHLORIDE 0.9 % (FLUSH) 0.9 %
3 SYRINGE (ML) INJECTION EVERY 12 HOURS SCHEDULED
Status: DISCONTINUED | OUTPATIENT
Start: 2021-10-14 | End: 2021-10-14 | Stop reason: HOSPADM

## 2021-10-14 RX ORDER — LIDOCAINE HYDROCHLORIDE 20 MG/ML
INJECTION, SOLUTION INFILTRATION; PERINEURAL AS NEEDED
Status: DISCONTINUED | OUTPATIENT
Start: 2021-10-14 | End: 2021-10-14 | Stop reason: SURG

## 2021-10-14 RX ORDER — PRAVASTATIN SODIUM 20 MG
20 TABLET ORAL DAILY
COMMUNITY
End: 2022-04-08

## 2021-10-14 RX ORDER — CETIRIZINE HYDROCHLORIDE 10 MG/1
10 TABLET ORAL DAILY PRN
COMMUNITY

## 2021-10-14 RX ORDER — SODIUM CHLORIDE 0.9 % (FLUSH) 0.9 %
10 SYRINGE (ML) INJECTION AS NEEDED
Status: DISCONTINUED | OUTPATIENT
Start: 2021-10-14 | End: 2021-10-14 | Stop reason: HOSPADM

## 2021-10-14 RX ORDER — PROPOFOL 10 MG/ML
VIAL (ML) INTRAVENOUS AS NEEDED
Status: DISCONTINUED | OUTPATIENT
Start: 2021-10-14 | End: 2021-10-14 | Stop reason: SURG

## 2021-10-14 RX ORDER — MAGNESIUM HYDROXIDE 1200 MG/15ML
LIQUID ORAL AS NEEDED
Status: DISCONTINUED | OUTPATIENT
Start: 2021-10-14 | End: 2021-10-14 | Stop reason: HOSPADM

## 2021-10-14 RX ORDER — SODIUM CHLORIDE, SODIUM LACTATE, POTASSIUM CHLORIDE, CALCIUM CHLORIDE 600; 310; 30; 20 MG/100ML; MG/100ML; MG/100ML; MG/100ML
30 INJECTION, SOLUTION INTRAVENOUS CONTINUOUS PRN
Status: DISCONTINUED | OUTPATIENT
Start: 2021-10-14 | End: 2021-10-14 | Stop reason: HOSPADM

## 2021-10-14 RX ADMIN — SODIUM CHLORIDE, POTASSIUM CHLORIDE, SODIUM LACTATE AND CALCIUM CHLORIDE 30 ML/HR: 600; 310; 30; 20 INJECTION, SOLUTION INTRAVENOUS at 10:34

## 2021-10-14 RX ADMIN — PROPOFOL 70 MG: 10 INJECTION, EMULSION INTRAVENOUS at 10:45

## 2021-10-14 RX ADMIN — LIDOCAINE HYDROCHLORIDE 60 MG: 20 INJECTION, SOLUTION INFILTRATION; PERINEURAL at 10:45

## 2021-10-14 RX ADMIN — PROPOFOL 160 MCG/KG/MIN: 10 INJECTION, EMULSION INTRAVENOUS at 10:45

## 2021-10-14 RX ADMIN — FAMOTIDINE 20 MG: 10 INJECTION INTRAVENOUS at 10:36

## 2021-10-14 NOTE — ANESTHESIA PREPROCEDURE EVALUATION
Anesthesia Evaluation     Patient summary reviewed and Nursing notes reviewed   no history of anesthetic complications:  NPO Solid Status: > 6 hours  NPO Liquid Status: > 6 hours           Airway   Mallampati: II  TM distance: >3 FB  Neck ROM: full  no difficulty expected and No difficulty expected  Dental - normal exam     Pulmonary - normal exam    breath sounds clear to auscultation  (+) sleep apnea,   (-) rhonchi, decreased breath sounds, wheezes, rales, stridor  Cardiovascular - normal exam    NYHA Classification: I  ECG reviewed  Rhythm: regular  Rate: normal    (+) hypertension, hyperlipidemia,   (-) murmur, weak pulses, friction rub, systolic click, carotid bruits, JVD, peripheral edema      Neuro/Psych  (+) headaches, psychiatric history Depression,     GI/Hepatic/Renal/Endo    (+) obesity,  hiatal hernia, GERD,      Musculoskeletal     Abdominal  - normal exam    Abdomen: soft.   Substance History   (+) alcohol use,      OB/GYN negative ob/gyn ROS         Other   arthritis,                      Anesthesia Plan    ASA 3     MAC     intravenous induction     Anesthetic plan, all risks, benefits, and alternatives have been provided, discussed and informed consent has been obtained with: patient.

## 2021-10-15 LAB
LAB AP CASE REPORT: NORMAL
LAB AP CLINICAL INFORMATION: NORMAL
PATH REPORT.FINAL DX SPEC: NORMAL
PATH REPORT.GROSS SPEC: NORMAL

## 2021-10-21 DIAGNOSIS — A04.8 BACTERIAL INFECTION DUE TO HELICOBACTER PYLORI: Primary | ICD-10-CM

## 2021-10-21 RX ORDER — METRONIDAZOLE 250 MG/1
250 TABLET ORAL 4 TIMES DAILY
Qty: 56 TABLET | Refills: 0 | Status: SHIPPED | OUTPATIENT
Start: 2021-10-21 | End: 2021-10-26 | Stop reason: HOSPADM

## 2021-10-21 RX ORDER — BISMUTH SUBSALICYLATE 262 MG
524 TABLET,CHEWABLE ORAL 4 TIMES DAILY
Qty: 112 TABLET | Refills: 0 | Status: SHIPPED | OUTPATIENT
Start: 2021-10-21 | End: 2022-04-11 | Stop reason: HOSPADM

## 2021-10-21 RX ORDER — OMEPRAZOLE 40 MG/1
40 CAPSULE, DELAYED RELEASE ORAL 2 TIMES DAILY
Qty: 28 CAPSULE | Refills: 0 | Status: SHIPPED | OUTPATIENT
Start: 2021-10-21 | End: 2021-11-04

## 2021-10-21 RX ORDER — TETRACYCLINE HYDROCHLORIDE 500 MG/1
500 CAPSULE ORAL 4 TIMES DAILY
Qty: 56 CAPSULE | Refills: 0 | Status: SHIPPED | OUTPATIENT
Start: 2021-10-21 | End: 2021-10-26 | Stop reason: HOSPADM

## 2021-10-22 ENCOUNTER — APPOINTMENT (OUTPATIENT)
Dept: GENERAL RADIOLOGY | Facility: HOSPITAL | Age: 71
End: 2021-10-22

## 2021-10-22 ENCOUNTER — APPOINTMENT (OUTPATIENT)
Dept: CT IMAGING | Facility: HOSPITAL | Age: 71
End: 2021-10-22

## 2021-10-22 ENCOUNTER — HOSPITAL ENCOUNTER (OUTPATIENT)
Facility: HOSPITAL | Age: 71
Setting detail: OBSERVATION
LOS: 2 days | Discharge: HOME | End: 2021-10-26
Attending: EMERGENCY MEDICINE | Admitting: INTERNAL MEDICINE

## 2021-10-22 DIAGNOSIS — R91.1 PULMONARY NODULE SEEN ON IMAGING STUDY: ICD-10-CM

## 2021-10-22 DIAGNOSIS — R41.82 ALTERED MENTAL STATUS, UNSPECIFIED ALTERED MENTAL STATUS TYPE: ICD-10-CM

## 2021-10-22 DIAGNOSIS — R29.6 FREQUENT FALLS: Primary | ICD-10-CM

## 2021-10-22 DIAGNOSIS — R44.3 HALLUCINATIONS: ICD-10-CM

## 2021-10-22 DIAGNOSIS — R41.3 MEMORY DEFICIT: ICD-10-CM

## 2021-10-22 LAB
ALBUMIN SERPL-MCNC: 3.4 G/DL (ref 3.5–5.2)
ALBUMIN/GLOB SERPL: 1.3 G/DL
ALP SERPL-CCNC: 70 U/L (ref 39–117)
ALT SERPL W P-5'-P-CCNC: 12 U/L (ref 1–33)
AMMONIA BLD-SCNC: 22 UMOL/L (ref 11–51)
ANION GAP SERPL CALCULATED.3IONS-SCNC: 6.5 MMOL/L (ref 5–15)
AST SERPL-CCNC: 19 U/L (ref 1–32)
BASOPHILS # BLD AUTO: 0.03 10*3/MM3 (ref 0–0.2)
BASOPHILS NFR BLD AUTO: 0.5 % (ref 0–1.5)
BILIRUB SERPL-MCNC: 0.3 MG/DL (ref 0–1.2)
BILIRUB UR QL STRIP: NEGATIVE
BUN SERPL-MCNC: 10 MG/DL (ref 8–23)
BUN/CREAT SERPL: 9.3 (ref 7–25)
CALCIUM SPEC-SCNC: 8.5 MG/DL (ref 8.6–10.5)
CHLORIDE SERPL-SCNC: 106 MMOL/L (ref 98–107)
CLARITY UR: CLEAR
CO2 SERPL-SCNC: 29.5 MMOL/L (ref 22–29)
COLOR UR: YELLOW
CREAT SERPL-MCNC: 1.07 MG/DL (ref 0.57–1)
DEPRECATED RDW RBC AUTO: 47.4 FL (ref 37–54)
EOSINOPHIL # BLD AUTO: 0.13 10*3/MM3 (ref 0–0.4)
EOSINOPHIL NFR BLD AUTO: 2.2 % (ref 0.3–6.2)
ERYTHROCYTE [DISTWIDTH] IN BLOOD BY AUTOMATED COUNT: 14.1 % (ref 12.3–15.4)
GFR SERPL CREATININE-BSD FRML MDRD: 50 ML/MIN/1.73
GLOBULIN UR ELPH-MCNC: 2.7 GM/DL
GLUCOSE SERPL-MCNC: 124 MG/DL (ref 65–99)
GLUCOSE UR STRIP-MCNC: NEGATIVE MG/DL
HCT VFR BLD AUTO: 34.6 % (ref 34–46.6)
HGB BLD-MCNC: 11.1 G/DL (ref 12–15.9)
HGB UR QL STRIP.AUTO: NEGATIVE
HOLD SPECIMEN: NORMAL
HOLD SPECIMEN: NORMAL
IMM GRANULOCYTES # BLD AUTO: 0.01 10*3/MM3 (ref 0–0.05)
IMM GRANULOCYTES NFR BLD AUTO: 0.2 % (ref 0–0.5)
KETONES UR QL STRIP: NEGATIVE
LEUKOCYTE ESTERASE UR QL STRIP.AUTO: NEGATIVE
LYMPHOCYTES # BLD AUTO: 1.61 10*3/MM3 (ref 0.7–3.1)
LYMPHOCYTES NFR BLD AUTO: 27.2 % (ref 19.6–45.3)
MCH RBC QN AUTO: 29.7 PG (ref 26.6–33)
MCHC RBC AUTO-ENTMCNC: 32.1 G/DL (ref 31.5–35.7)
MCV RBC AUTO: 92.5 FL (ref 79–97)
MONOCYTES # BLD AUTO: 0.6 10*3/MM3 (ref 0.1–0.9)
MONOCYTES NFR BLD AUTO: 10.1 % (ref 5–12)
NEUTROPHILS NFR BLD AUTO: 3.55 10*3/MM3 (ref 1.7–7)
NEUTROPHILS NFR BLD AUTO: 59.8 % (ref 42.7–76)
NITRITE UR QL STRIP: NEGATIVE
NRBC BLD AUTO-RTO: 0 /100 WBC (ref 0–0.2)
PH UR STRIP.AUTO: 5.5 [PH] (ref 5–8)
PLATELET # BLD AUTO: 249 10*3/MM3 (ref 140–450)
PMV BLD AUTO: 8.7 FL (ref 6–12)
POTASSIUM SERPL-SCNC: 4.2 MMOL/L (ref 3.5–5.2)
PROT SERPL-MCNC: 6.1 G/DL (ref 6–8.5)
PROT UR QL STRIP: NEGATIVE
RBC # BLD AUTO: 3.74 10*6/MM3 (ref 3.77–5.28)
SARS-COV-2 RNA PNL SPEC NAA+PROBE: NOT DETECTED
SODIUM SERPL-SCNC: 142 MMOL/L (ref 136–145)
SP GR UR STRIP: 1.01 (ref 1–1.03)
UROBILINOGEN UR QL STRIP: NORMAL
WBC # BLD AUTO: 5.93 10*3/MM3 (ref 3.4–10.8)
WHOLE BLOOD HOLD SPECIMEN: NORMAL
WHOLE BLOOD HOLD SPECIMEN: NORMAL

## 2021-10-22 PROCEDURE — 96361 HYDRATE IV INFUSION ADD-ON: CPT

## 2021-10-22 PROCEDURE — 71045 X-RAY EXAM CHEST 1 VIEW: CPT

## 2021-10-22 PROCEDURE — 72125 CT NECK SPINE W/O DYE: CPT

## 2021-10-22 PROCEDURE — 80307 DRUG TEST PRSMV CHEM ANLYZR: CPT | Performed by: INTERNAL MEDICINE

## 2021-10-22 PROCEDURE — 85025 COMPLETE CBC W/AUTO DIFF WBC: CPT | Performed by: NURSE PRACTITIONER

## 2021-10-22 PROCEDURE — 82140 ASSAY OF AMMONIA: CPT | Performed by: INTERNAL MEDICINE

## 2021-10-22 PROCEDURE — G0378 HOSPITAL OBSERVATION PER HR: HCPCS

## 2021-10-22 PROCEDURE — 81003 URINALYSIS AUTO W/O SCOPE: CPT | Performed by: NURSE PRACTITIONER

## 2021-10-22 PROCEDURE — 80053 COMPREHEN METABOLIC PANEL: CPT | Performed by: NURSE PRACTITIONER

## 2021-10-22 PROCEDURE — 70450 CT HEAD/BRAIN W/O DYE: CPT

## 2021-10-22 PROCEDURE — 99285 EMERGENCY DEPT VISIT HI MDM: CPT

## 2021-10-22 PROCEDURE — 87635 SARS-COV-2 COVID-19 AMP PRB: CPT | Performed by: NURSE PRACTITIONER

## 2021-10-22 PROCEDURE — 36415 COLL VENOUS BLD VENIPUNCTURE: CPT | Performed by: INTERNAL MEDICINE

## 2021-10-22 PROCEDURE — C9803 HOPD COVID-19 SPEC COLLECT: HCPCS

## 2021-10-22 RX ORDER — NITROGLYCERIN 0.4 MG/1
0.4 TABLET SUBLINGUAL
Status: DISCONTINUED | OUTPATIENT
Start: 2021-10-22 | End: 2021-10-26 | Stop reason: HOSPADM

## 2021-10-22 RX ORDER — ONDANSETRON 4 MG/1
4 TABLET, FILM COATED ORAL EVERY 6 HOURS PRN
Status: DISCONTINUED | OUTPATIENT
Start: 2021-10-22 | End: 2021-10-26 | Stop reason: HOSPADM

## 2021-10-22 RX ORDER — VILAZODONE HYDROCHLORIDE 40 MG/1
40 TABLET ORAL DAILY
Status: DISCONTINUED | OUTPATIENT
Start: 2021-10-23 | End: 2021-10-26 | Stop reason: HOSPADM

## 2021-10-22 RX ORDER — DIPHENOXYLATE HYDROCHLORIDE AND ATROPINE SULFATE 2.5; .025 MG/1; MG/1
1 TABLET ORAL DAILY
Status: COMPLETED | OUTPATIENT
Start: 2021-10-23 | End: 2021-10-25

## 2021-10-22 RX ORDER — SODIUM CHLORIDE 9 MG/ML
75 INJECTION, SOLUTION INTRAVENOUS CONTINUOUS
Status: DISCONTINUED | OUTPATIENT
Start: 2021-10-22 | End: 2021-10-26 | Stop reason: HOSPADM

## 2021-10-22 RX ORDER — ONDANSETRON 2 MG/ML
4 INJECTION INTRAMUSCULAR; INTRAVENOUS EVERY 6 HOURS PRN
Status: DISCONTINUED | OUTPATIENT
Start: 2021-10-22 | End: 2021-10-26 | Stop reason: HOSPADM

## 2021-10-22 RX ORDER — CLONIDINE HYDROCHLORIDE 0.1 MG/1
0.1 TABLET ORAL EVERY 4 HOURS PRN
Status: DISCONTINUED | OUTPATIENT
Start: 2021-10-22 | End: 2021-10-26 | Stop reason: HOSPADM

## 2021-10-22 RX ORDER — LISINOPRIL 20 MG/1
20 TABLET ORAL DAILY
Status: DISCONTINUED | OUTPATIENT
Start: 2021-10-23 | End: 2021-10-26 | Stop reason: HOSPADM

## 2021-10-22 RX ORDER — PREGABALIN 75 MG/1
150 CAPSULE ORAL EVERY 12 HOURS SCHEDULED
Status: DISCONTINUED | OUTPATIENT
Start: 2021-10-22 | End: 2021-10-26 | Stop reason: HOSPADM

## 2021-10-22 RX ORDER — UREA 10 %
3 LOTION (ML) TOPICAL NIGHTLY PRN
Status: DISCONTINUED | OUTPATIENT
Start: 2021-10-22 | End: 2021-10-26 | Stop reason: HOSPADM

## 2021-10-22 RX ORDER — ACETAMINOPHEN 325 MG/1
650 TABLET ORAL EVERY 4 HOURS PRN
Status: DISCONTINUED | OUTPATIENT
Start: 2021-10-22 | End: 2021-10-26 | Stop reason: HOSPADM

## 2021-10-22 RX ORDER — PANTOPRAZOLE SODIUM 40 MG/1
40 TABLET, DELAYED RELEASE ORAL
Status: DISCONTINUED | OUTPATIENT
Start: 2021-10-23 | End: 2021-10-24 | Stop reason: SDUPTHER

## 2021-10-22 RX ORDER — FOLIC ACID 1 MG/1
1 TABLET ORAL DAILY
Status: COMPLETED | OUTPATIENT
Start: 2021-10-23 | End: 2021-10-25

## 2021-10-22 RX ADMIN — Medication 100 MG: at 20:30

## 2021-10-22 RX ADMIN — PREGABALIN 150 MG: 75 CAPSULE ORAL at 22:41

## 2021-10-22 RX ADMIN — SODIUM CHLORIDE 75 ML/HR: 9 INJECTION, SOLUTION INTRAVENOUS at 20:30

## 2021-10-22 NOTE — H&P
HISTORY AND PHYSICAL   [facility]        Date of Admission: 10/22/2021  Patient Identification:  Name: Jaylyn Fuentes  Age: 71 y.o.  Sex: female  :  1950  MRN: [id number]                     Primary Care Physician: Annie Romero MD    Chief Complaint:  71 year old female who presented to the emergency room with confusion and frequent falls; she fell out of bed and her partner found her on the floor; the patient remains confused and cannot give any history; she denies pain presently; her partner is no longer present    History of Present Illness:   As above    Past Medical History:  Past Medical History:   Diagnosis Date   • Abdominal pain    • Abnormal CT of the abdomen    • Abnormal glucose    • Abnormal liver enzymes    • Abnormal urinalysis    • Achilles tendinitis     left   • Acid reflux disease    • Acromioclavicular joint arthritis    • Acromioclavicular joint pain, bilateral    • Aftercare following knee joint replacement surgery    • Alcohol abuse    • Alternating constipation and diarrhea    • Anemia    • Arthralgia of multiple sites    • Arthritis    • BMI 34.0-34.9,adult    • Carpal tunnel syndrome    • Cataract    • Cellulitis of extremity    • Chronic insomnia    • Chronic kidney disease, stage III (moderate) ([facility])    • Contact dermatitis    • Contusion of bone    • Cough    • Decreased range of motion    • Degeneration of cervical intervertebral disc    • Depressed    • Dermatitis    • Diarrhea    • Dry eye    • Dysphagia    • Edema of extremities    • Esophagitis    • Fatigue    • Flu vaccine need    • GERD (gastroesophageal reflux disease)    • Glenohumeral arthritis, left    • Hemorrhoids    • Hiatal hernia    • Hip pain    • History of colonic polyps    • History of DVT in adulthood    • History of transfusion    • Hyperactivity of bladder    • Hyperlipidemia    • Hypertension    • Hypokalemia    • Irritable bowel syndrome with diarrhea    • Lactose intolerance    •  Left ankle pain    • Leg cramps    • Leg swelling    • Limitation of joint motion of ankle, left    • Lower extremity tendinopathy    • Medicare annual wellness visit, subsequent    • Methicillin resistant Staphylococcus aureus infection    • Microscopic hematuria    • Migraine    • Obstructive sleep apnea    • Osteoarthritis, chronic    • Pain, foot, left, chronic    • Pancreatitis, chronic (HCC)    • Pes planus    • PONV (postoperative nausea and vomiting)     SCOPOLAMINE HELPS-USED DURING LAST SURGERY    • Rectal leakage    • Retrocalcaneal bursitis    • Right ankle instability    • Right ankle pain    • Right ankle sprain    • Right ankle swelling    • Rotator cuff tear, left    • S/P shoulder surgery    • Shoulder region pain    • Spinal stenosis    • Swallowing difficulty    • Urge incontinence of urine    • Urinary incontinence    • Visit for screening mammogram    • Wears contact lenses    • Wears dentures     UPPER AND LOWER    • Wears hearing aid     DOESN'T HAVE WITH HER   • Weight gain      Past Surgical History:  Past Surgical History:   Procedure Laterality Date   • BACK SURGERY      LUMBAR FUSION    • CERVICAL FUSION      plates, rods, and screws   • CHOLECYSTECTOMY     • COLONOSCOPY      2 YEARS AGO    • ENDOSCOPY N/A 10/14/2021    Procedure: ESOPHAGOGASTRODUODENOSCOPY with biopsy and polypectomy;  Surgeon: Hussain Basilio MD;  Location: [facility] OR;  Service: Gastroenterology;  Laterality: N/A;  gastritis and polyps   • EYE SURGERY      LASER FOR KERITONOSIS    • GALLBLADDER SURGERY     • HERNIA REPAIR     • HYSTERECTOMY     • INGUINAL HERNIA REPAIR     • JOINT REPLACEMENT      BILATERAL KNEES, BILATERAL HIPS   • NECK SURGERY     • IA RECONSTR TOTAL SHOULDER IMPLANT Left 2/13/2017    Procedure: TOTAL SHOULDER REPLACEMENT LEFT;  Surgeon: Benigno Chavez MD;  Location: [facility] OR;  Service: Orthopedics   • REPLACEMENT TOTAL KNEE     • SHOULDER LIGAMENT REPAIR Right    • TONSILLECTOMY     •  "TOTAL HIP ARTHROPLASTY     • UPPER GASTROINTESTINAL ENDOSCOPY        Home Meds:  (Not in a hospital admission)      Allergies:  Allergies   Allergen Reactions   • Baclofen Other (See Comments)     \"makes me crazy\"   • Keflex [Cephalexin] GI Intolerance   • Sulfa Antibiotics Rash     SKIN BRIGHT RED      Immunizations:  Immunization History   Administered Date(s) Administered   • COVID-19 (PFIZER) 2021, 2021   • Tdap 2020     Social History:   Social History     Social History Narrative   • Not on file     Social History     Socioeconomic History   • Marital status: Single   Tobacco Use   • Smoking status: Former Smoker   • Smokeless tobacco: Never Used   Vaping Use   • Vaping Use: Never used   Substance and Sexual Activity   • Alcohol use: Yes     Comment: [employer]    • Drug use: No   • Sexual activity: Defer       Family History:  Family History   Problem Relation Age of Onset   • Arthritis Other    • Colonic polyp Other    • Coronary artery disease Other         Review of Systems  Not obtainable from the patient due to confusion    Objective:  T Max 24 hrs: Temp (24hrs), Av.4 °F (36.3 °C), Min:97.4 °F (36.3 °C), Max:97.4 °F (36.3 °C)    Vitals Ranges:   Temp:  [97.4 °F (36.3 °C)] 97.4 °F (36.3 °C)  Heart Rate:  [70-82] 70  Resp:  [16-18] 16  BP: (114-130)/(67-85) 130/85      Exam:  /85   Pulse 70   Temp 97.4 °F (36.3 °C)   Resp 16   Ht 162.6 cm (64\")   Wt 95.3 kg (210 lb)   SpO2 97%   BMI 36.05 kg/m²     General Appearance:    Alert, cooperative, no distress, appears stated age   Head:    Normocephalic, without obvious abnormality, atraumatic   Eyes:    PERRL, conjunctivae/corneas clear, EOM's intact, both eyes   Ears:    Normal external ear canals, both ears   Nose:   Nares normal, septum midline, mucosa normal, no drainage    or sinus tenderness   Throat:   Lips, mucosa, and tongue normal   Neck:   Supple, symmetrical, trachea midline, no adenopathy;     thyroid:  " no enlargement/tenderness/nodules; no carotid    bruit or JVD   Back:     Symmetric, no curvature, ROM normal, no CVA tenderness   Lungs:     Decreased breath sounds bilaterally, respirations unlabored   Chest Wall:    No tenderness or deformity    Heart:    Regular rate and rhythm, S1 and S2 normal, no murmur, rub   or gallop   Abdomen:     Soft, nontender, bowel sounds active all four quadrants,     no masses, no hepatomegaly, no splenomegaly   Extremities:   Extremities normal, atraumatic, no cyanosis or edema   Pulses:   2+ and symmetric all extremities   Skin:   Skin color, texture, turgor normal, no rashes or lesions   Lymph nodes:   Cervical, supraclavicular, and axillary nodes normal   Neurologic:   CNII-XII intact, normal strength, sensation intact throughout      .    Data Review:  Labs in chart were reviewed.  WBC   Date Value Ref Range Status   10/22/2021 5.93 3.40 - 10.80 10*3/mm3 Final     Hemoglobin   Date Value Ref Range Status   10/22/2021 11.1 (L) 12.0 - 15.9 g/dL Final     Hematocrit   Date Value Ref Range Status   10/22/2021 34.6 34.0 - 46.6 % Final     Platelets   Date Value Ref Range Status   10/22/2021 249 140 - 450 10*3/mm3 Final     Sodium   Date Value Ref Range Status   10/22/2021 142 136 - 145 mmol/L Final     Potassium   Date Value Ref Range Status   10/22/2021 4.2 3.5 - 5.2 mmol/L Final     Chloride   Date Value Ref Range Status   10/22/2021 106 98 - 107 mmol/L Final     CO2   Date Value Ref Range Status   10/22/2021 29.5 (H) 22.0 - 29.0 mmol/L Final     BUN   Date Value Ref Range Status   10/22/2021 10 8 - 23 mg/dL Final     Creatinine   Date Value Ref Range Status   10/22/2021 1.07 (H) 0.57 - 1.00 mg/dL Final     Glucose   Date Value Ref Range Status   10/22/2021 124 (H) 65 - 99 mg/dL Final     Calcium   Date Value Ref Range Status   10/22/2021 8.5 (L) 8.6 - 10.5 mg/dL Final     AST (SGOT)   Date Value Ref Range Status   10/22/2021 19 1 - 32 U/L Final     ALT (SGPT)   Date Value Ref  Range Status   10/22/2021 12 1 - 33 U/L Final     Alkaline Phosphatase   Date Value Ref Range Status   10/22/2021 70 39 - 117 U/L Final     No results found for: APTT, INR             Imaging Results (All)     Procedure Component Value Units Date/Time    CT Head Without Contrast [[id number]] Collected: 10/22/21 1816     Updated: 10/22/21 1825    Narrative:      CT HEAD WO CONTRAST-, CT CERVICAL SPINE WO CONTRAST-     INDICATIONS: Trauma     TECHNIQUE: Radiation dose reduction techniques were utilized, including  automated exposure control and exposure modulation based on body size.  Noncontrast head CT, cervical spine CT     COMPARISON: None available     FINDINGS:     Head CT:     No acute intracranial hemorrhage, midline shift or mass effect. No acute  territorial infarct is identified. Bilateral basal ganglia  mineralization is present.     Mild periventricular hypodensities suggest chronic small vessel ischemic  change in a patient this age.           Ventricles, cisterns, cerebral sulci are unremarkable for patient age.     Mild paranasal sinus mucosal thickening is noted. The visualized  paranasal sinuses, orbits, mastoid air cells are otherwise unremarkable.  Circumscribed subcutaneous densities at the anterior upper scalp, about  1 cm on axial image 42, and another measuring 0.9 cm on image 47, could  be a sebaceous cysts, correlate with clinical exam.           Cervical spine CT:     No acute fracture or malalignment is noted. Anterior plate and screw  fusion hardware is seen at C3, C5, with intervening corpectomy cage.     A calcified nodule is apparent in the partly included right upper lung,  as well as adjacent partly included indeterminate nodule in the lower  most image, axial image 141 (measures 4 mm to extent partly included),  CT follow-up recommended.                   Impression:         1. Partly included nonspecific pulmonary nodule, follow-up recommended.  2. No acute intracranial hemorrhage or  hydrocephalus. No acute cervical  fracture identified; degenerative and surgical changes of the cervical  spine. If there is further clinical concern, MRI could be considered for  further evaluation.     This report was finalized on 10/22/2021 6:22 PM by Dr. Serg Figueroa M.D.       CT Cervical Spine Without Contrast [[id number]] Collected: 10/22/21 1816     Updated: 10/22/21 1825    Narrative:      CT HEAD WO CONTRAST-, CT CERVICAL SPINE WO CONTRAST-     INDICATIONS: Trauma     TECHNIQUE: Radiation dose reduction techniques were utilized, including  automated exposure control and exposure modulation based on body size.  Noncontrast head CT, cervical spine CT     COMPARISON: None available     FINDINGS:     Head CT:     No acute intracranial hemorrhage, midline shift or mass effect. No acute  territorial infarct is identified. Bilateral basal ganglia  mineralization is present.     Mild periventricular hypodensities suggest chronic small vessel ischemic  change in a patient this age.           Ventricles, cisterns, cerebral sulci are unremarkable for patient age.     Mild paranasal sinus mucosal thickening is noted. The visualized  paranasal sinuses, orbits, mastoid air cells are otherwise unremarkable.  Circumscribed subcutaneous densities at the anterior upper scalp, about  1 cm on axial image 42, and another measuring 0.9 cm on image 47, could  be a sebaceous cysts, correlate with clinical exam.           Cervical spine CT:     No acute fracture or malalignment is noted. Anterior plate and screw  fusion hardware is seen at C3, C5, with intervening corpectomy cage.     A calcified nodule is apparent in the partly included right upper lung,  as well as adjacent partly included indeterminate nodule in the lower  most image, axial image 141 (measures 4 mm to extent partly included),  CT follow-up recommended.                   Impression:         1. Partly included nonspecific pulmonary nodule, follow-up  recommended.  2. No acute intracranial hemorrhage or hydrocephalus. No acute cervical  fracture identified; degenerative and surgical changes of the cervical  spine. If there is further clinical concern, MRI could be considered for  further evaluation.     This report was finalized on 10/22/2021 6:22 PM by Dr. Serg Figueroa M.D.       XR Chest 1 View [[id number]] Collected: 10/22/21 1645     Updated: 10/22/21 1650    Narrative:      PORTABLE CHEST 10/22/2021 AT 4:23 PM     CLINICAL HISTORY: Confusion.     Compared to a previous chest dated 09/15/2021.     The lungs appear fairly well-expanded and are free of focal infiltrates.  There are no pleural effusions. The heart is mildly prominent.. A left  shoulder arthroplasty is noted.     IMPRESSIONS: Mild cardiomegaly. No evidence of acute disease within the  chest.     This report was finalized on 10/22/2021 4:47 PM by Dr. Gaston Driscoll M.D.           Patient Active Problem List   Diagnosis Code   • Osteoarthrosis, localized, primary, shoulder region M19.019   • Hypertension I10   • Depressed F32.A   • Status post total replacement of left shoulder Z96.612   • Duodenitis K29.80   • Abnormal CT scan, small bowel R93.3   • Nausea and vomiting R11.2   • Observed sleep apnea G47.30   • Snoring R06.83   • Hypersomnia G47.10   • Non-restorative sleep G47.8   • Class 2 obesity E66.9   • Poor memory R41.3   • Frequent falls R29.6       Assessment:    Frequent falls  altered mental status  Sleep apnea  Morbid obesity  Hyperglycemia  Anemia  Pulmonary nodule    Plan:  She has a history of alcohol abuse according to the chart  Will give thiamine, folate, mvi  Check urine drug screen  Monitor on telemetry  She also has a history of memory problems according to her medical record  Check labs in am  Physical therapy to see    Noris Arriola MD  10/22/2021  19:53 EDT

## 2021-10-22 NOTE — ED PROVIDER NOTES
EMERGENCY DEPARTMENT ENCOUNTER    Room Number:  08/08  Date seen:  10/22/2021  Time seen: 16:46 EDT  PCP: Annie Romero MD  Historian: EMS    HPI:  Chief complaint:AMS, confusion  A complete HPI/ROS/PMH/PSH/SH/FH are unobtainable due to: altered mental status  Context:Jaylyn Fuentes is a 71 y.o. female who presents to the ED with c/o increased confusion and frequent falls over the past week or so.  Other history is limited due to patient's pleasantly AMS.     Patient was placed in face mask in first look. Patient was wearing facemask when I entered the room and throughout our encounter. I wore full protective equipment throughout this patient encounter including a N 95 face mask, eye shield and gloves. Hand hygiene/washing of hands was performed before donning protective equipment and after removal when leaving the room.      MEDICAL RECORD REVIEW    ALLERGIES  Baclofen, Keflex [cephalexin], and Sulfa antibiotics    PAST MEDICAL HISTORY  Active Ambulatory Problems     Diagnosis Date Noted   • Osteoarthrosis, localized, primary, shoulder region 02/13/2017   • Hypertension 02/13/2017   • Depressed 02/13/2017   • Status post total replacement of left shoulder 02/13/2017   • Duodenitis 09/27/2021   • Abnormal CT scan, small bowel 09/27/2021   • Nausea and vomiting 09/27/2021   • Observed sleep apnea 09/30/2021   • Snoring 09/30/2021   • Hypersomnia 09/30/2021   • Non-restorative sleep 09/30/2021   • Class 2 obesity 09/30/2021   • Poor memory 09/30/2021     Resolved Ambulatory Problems     Diagnosis Date Noted   • No Resolved Ambulatory Problems     Past Medical History:   Diagnosis Date   • Abdominal pain    • Abnormal CT of the abdomen    • Abnormal glucose    • Abnormal liver enzymes    • Abnormal urinalysis    • Achilles tendinitis    • Acid reflux disease    • Acromioclavicular joint arthritis    • Acromioclavicular joint pain, bilateral    • Aftercare following knee joint replacement surgery    • Alcohol  abuse    • Alternating constipation and diarrhea    • Anemia    • Arthralgia of multiple sites    • Arthritis    • BMI 34.0-34.9,adult    • Carpal tunnel syndrome    • Cataract    • Cellulitis of extremity    • Chronic insomnia    • Chronic kidney disease, stage III (moderate) ([facility])    • Contact dermatitis    • Contusion of bone    • Cough    • Decreased range of motion    • Degeneration of cervical intervertebral disc    • Dermatitis    • Diarrhea    • Dry eye    • Dysphagia    • Edema of extremities    • Esophagitis    • Fatigue    • Flu vaccine need    • GERD (gastroesophageal reflux disease)    • Glenohumeral arthritis, left    • Hemorrhoids    • Hiatal hernia    • Hip pain    • History of colonic polyps    • History of DVT in adulthood    • History of transfusion    • Hyperactivity of bladder    • Hyperlipidemia    • Hypokalemia    • Irritable bowel syndrome with diarrhea    • Lactose intolerance    • Left ankle pain    • Leg cramps    • Leg swelling    • Limitation of joint motion of ankle, left    • Lower extremity tendinopathy    • Medicare annual wellness visit, subsequent    • Methicillin resistant Staphylococcus aureus infection    • Microscopic hematuria    • Migraine    • Obstructive sleep apnea    • Osteoarthritis, chronic    • Pain, foot, left, chronic    • Pancreatitis, chronic ([facility])    • Pes planus    • PONV (postoperative nausea and vomiting)    • Rectal leakage    • Retrocalcaneal bursitis    • Right ankle instability    • Right ankle pain    • Right ankle sprain    • Right ankle swelling    • Rotator cuff tear, left    • S/P shoulder surgery    • Shoulder region pain    • Spinal stenosis    • Swallowing difficulty    • Urge incontinence of urine    • Urinary incontinence    • Visit for screening mammogram    • Wears contact lenses    • Wears dentures    • Wears hearing aid    • Weight gain        PAST SURGICAL HISTORY  Past Surgical History:   Procedure Laterality Date   • BACK SURGERY       LUMBAR FUSION    • CERVICAL FUSION      plates, rods, and screws   • CHOLECYSTECTOMY     • COLONOSCOPY      2 YEARS AGO    • ENDOSCOPY N/A 10/14/2021    Procedure: ESOPHAGOGASTRODUODENOSCOPY with biopsy and polypectomy;  Surgeon: Hussain Basilio MD;  Location: [facility] OR;  Service: Gastroenterology;  Laterality: N/A;  gastritis and polyps   • EYE SURGERY      LASER FOR KERITONOSIS    • GALLBLADDER SURGERY     • HERNIA REPAIR     • HYSTERECTOMY     • INGUINAL HERNIA REPAIR     • JOINT REPLACEMENT      BILATERAL KNEES, BILATERAL HIPS   • NECK SURGERY     • CT RECONSTR TOTAL SHOULDER IMPLANT Left 2/13/2017    Procedure: TOTAL SHOULDER REPLACEMENT LEFT;  Surgeon: Benigno Chavez MD;  Location: [facility] OR;  Service: Orthopedics   • REPLACEMENT TOTAL KNEE     • SHOULDER LIGAMENT REPAIR Right    • TONSILLECTOMY     • TOTAL HIP ARTHROPLASTY     • UPPER GASTROINTESTINAL ENDOSCOPY         FAMILY HISTORY  Family History   Problem Relation Age of Onset   • Arthritis Other    • Colonic polyp Other    • Coronary artery disease Other        SOCIAL HISTORY  Social History     Socioeconomic History   • Marital status: Single   Tobacco Use   • Smoking status: Former Smoker   • Smokeless tobacco: Never Used   Vaping Use   • Vaping Use: Never used   Substance and Sexual Activity   • Alcohol use: Yes     Comment: [employer]IALLY    • Drug use: No   • Sexual activity: Defer       REVIEW OF SYSTEMS  Review of Systems   Unable to perform ROS: Mental status change     Pleasantly confused and not oriented x 4 @ baseline    PHYSICAL EXAM    ED Triage Vitals [10/22/21 1619]   Temp Heart Rate Resp BP SpO2   97.4 °F (36.3 °C) 82 18 114/67 93 %      Temp src Heart Rate Source Patient Position BP Location FiO2 (%)   -- -- -- -- --     Physical Exam    I have reviewed the triage vital signs and nursing notes.      GENERAL: not distressed, cooperative, pleasantly confused  HENT: nares patent, mm moist  EYES: no scleral icterus,  PERRL  NECK: no ROM limitations  CV: regular rhythm, regular rate, no murmur, no rubs, no gallups  RESPIRATORY: normal effort, CTAB  ABDOMEN: soft  : deferred  MUSCULOSKELETAL: no deformity  NEURO: alert, moves all extremities, follows commands  SKIN: warm, dry    LAB RESULTS  Recent Results (from the past 24 hour(s))   Comprehensive Metabolic Panel    Collection Time: 10/22/21  4:46 PM    Specimen: Blood   Result Value Ref Range    Glucose 124 (H) 65 - 99 mg/dL    BUN 10 8 - 23 mg/dL    Creatinine 1.07 (H) 0.57 - 1.00 mg/dL    Sodium 142 136 - 145 mmol/L    Potassium 4.2 3.5 - 5.2 mmol/L    Chloride 106 98 - 107 mmol/L    CO2 29.5 (H) 22.0 - 29.0 mmol/L    Calcium 8.5 (L) 8.6 - 10.5 mg/dL    Total Protein 6.1 6.0 - 8.5 g/dL    Albumin 3.40 (L) 3.50 - 5.20 g/dL    ALT (SGPT) 12 1 - 33 U/L    AST (SGOT) 19 1 - 32 U/L    Alkaline Phosphatase 70 39 - 117 U/L    Total Bilirubin 0.3 0.0 - 1.2 mg/dL    eGFR Non African Amer 50 (L) >60 mL/min/1.73    Globulin 2.7 gm/dL    A/G Ratio 1.3 g/dL    BUN/Creatinine Ratio 9.3 7.0 - 25.0    Anion Gap 6.5 5.0 - 15.0 mmol/L   CBC Auto Differential    Collection Time: 10/22/21  4:46 PM    Specimen: Blood   Result Value Ref Range    WBC 5.93 3.40 - 10.80 10*3/mm3    RBC 3.74 (L) 3.77 - 5.28 10*6/mm3    Hemoglobin 11.1 (L) 12.0 - 15.9 g/dL    Hematocrit 34.6 34.0 - 46.6 %    MCV 92.5 79.0 - 97.0 fL    MCH 29.7 26.6 - 33.0 pg    MCHC 32.1 31.5 - 35.7 g/dL    RDW 14.1 12.3 - 15.4 %    RDW-SD 47.4 37.0 - 54.0 fl    MPV 8.7 6.0 - 12.0 fL    Platelets 249 140 - 450 10*3/mm3    Neutrophil % 59.8 42.7 - 76.0 %    Lymphocyte % 27.2 19.6 - 45.3 %    Monocyte % 10.1 5.0 - 12.0 %    Eosinophil % 2.2 0.3 - 6.2 %    Basophil % 0.5 0.0 - 1.5 %    Immature Grans % 0.2 0.0 - 0.5 %    Neutrophils, Absolute 3.55 1.70 - 7.00 10*3/mm3    Lymphocytes, Absolute 1.61 0.70 - 3.10 10*3/mm3    Monocytes, Absolute 0.60 0.10 - 0.90 10*3/mm3    Eosinophils, Absolute 0.13 0.00 - 0.40 10*3/mm3    Basophils,  Absolute 0.03 0.00 - 0.20 10*3/mm3    Immature Grans, Absolute 0.01 0.00 - 0.05 10*3/mm3    nRBC 0.0 0.0 - 0.2 /100 WBC   Green Top (Gel)    Collection Time: 10/22/21  4:46 PM   Result Value Ref Range    Extra Tube Hold for add-ons.    Lavender Top    Collection Time: 10/22/21  4:46 PM   Result Value Ref Range    Extra Tube hold for add-on    Gold Top - SST    Collection Time: 10/22/21  4:46 PM   Result Value Ref Range    Extra Tube Hold for add-ons.    Light Blue Top    Collection Time: 10/22/21  4:46 PM   Result Value Ref Range    Extra Tube hold for add-on    Urinalysis With Culture If Indicated - Urine, Catheter In/Out    Collection Time: 10/22/21  4:55 PM    Specimen: Urine, Catheter In/Out   Result Value Ref Range    Color, UA Yellow Yellow, Straw    Appearance, UA Clear Clear    pH, UA 5.5 5.0 - 8.0    Specific Gravity, UA 1.013 1.005 - 1.030    Glucose, UA Negative Negative    Ketones, UA Negative Negative    Bilirubin, UA Negative Negative    Blood, UA Negative Negative    Protein, UA Negative Negative    Leuk Esterase, UA Negative Negative    Nitrite, UA Negative Negative    Urobilinogen, UA 0.2 E.U./dL 0.2 - 1.0 E.U./dL   COVID-19, [facility] IN-HOUSE CEPHEID/CHICHO NP SWAB IN TRANSPORT MEDIA 8-12 HR TAT - Swab, Nasopharynx    Collection Time: 10/22/21  5:47 PM    Specimen: Nasopharynx; Swab   Result Value Ref Range    COVID19 Not Detected Not Detected - Ref. Range         RADIOLOGY RESULTS  CT Head Without Contrast   Final Result       1. Partly included nonspecific pulmonary nodule, follow-up recommended.   2. No acute intracranial hemorrhage or hydrocephalus. No acute cervical   fracture identified; degenerative and surgical changes of the cervical   spine. If there is further clinical concern, MRI could be considered for   further evaluation.       This report was finalized on 10/22/2021 6:22 PM by Dr. Serg Figueroa M.D.          CT Cervical Spine Without Contrast   Final Result       1. Partly  included nonspecific pulmonary nodule, follow-up recommended.   2. No acute intracranial hemorrhage or hydrocephalus. No acute cervical   fracture identified; degenerative and surgical changes of the cervical   spine. If there is further clinical concern, MRI could be considered for   further evaluation.       This report was finalized on 10/22/2021 6:22 PM by Dr. Serg Figueroa M.D.          XR Chest 1 View   Final Result            PROGRESS, DATA ANALYSIS, CONSULTS AND MEDICAL DECISION MAKING  All labs have been independently reviewed by me.  All radiology studies have been reviewed by me and discussed with radiologist dictating the report.  EKG's independently viewed and interpreted by me unless stated otherwise. Discussion below represents my analysis of pertinent findings related to patient's condition, differential diagnosis, treatment plan and final disposition.     ED Course as of 10/22/21 2017   Fri Oct 22, 2021   1738 I have updated patient and her partner (who has POA) on status of workup thus far.  Labs are reassuring.  CT head and CT cervical spine pending at this time.  Pt is definitely confused.  I discussed she should be using walker more frequently at home and I also discussed with her partner that the patient needs supervision when getting her pills together for the week.     Partner states she had fallen out of bed this am and then when partner returned home she had fallen down again in kitchen.     Additionally, I discussed incidental finding of pulmonary nodule with patient and her partner.  [EW]   1830 CT head and CT cervical spine are not acute.  Pt clearly is confused and speaking out of her head a bit.  I discussed again the plan for admission for further workup.  [EW]   1845 Discussed admission with Dr. Arriola [facility].  She agrees to admit patient for further workup.  [EW]      ED Course User Index  [EW] Mary Giraldo, APRN     DDX: Differential diagnosis for altered mental status  "includes but is not limited to:  - vital sign abnormalities such as HTN encephalopathy, hypotension, hypoxemia, hypercarbia, heat stroke  - toxic/metabolic pathology such as hypoglycemia, DKA, hypo/hyper-natremia, thyroid storm, myxedema coma, medication side effect (either intentional or accidental)  - infectious etiology  - intracranial pathology such as stroke, seizure, intracranial mass, intracranial hemorrhage  - psychiatric pathology    MDM: No clear cause has been found to contribute to the patient's altered mental status.  In talking with her partner I do feel that the patient has had some memory problems as of late and these are possibly progressing as well as she has some polypharmacy which could be interfering with this altered mental status.  Her urine is not infected.  Her labs are overall reassuring.  She does not appear hypoxic and is not tachycardic while here in the emergency room.    Reviewed pt's history and workup with Dr. Gomez.  After a bedside evaluation, Dr. Gomez agrees with the plan of care.    Based on the patient's lab findings and presenting symptoms, the doctor and I feel it is appropriate to admit the patient for further management, evaluation, and treatment.  I have discussed this with the admitting team.  I have also discussed this with the patient/family.  They are in agreement with admission.          Disposition vitals:  /83   Pulse 67   Temp 97.4 °F (36.3 °C)   Resp 16   Ht 162.6 cm (64\")   Wt 95.3 kg (210 lb)   SpO2 96%   BMI 36.05 kg/m²       DIAGNOSIS  Final diagnoses:   Frequent falls   Altered mental status, unspecified altered mental status type   Pulmonary nodule seen on imaging study       Admission     Mary Giraldo, APRN  10/22/21 2017    "

## 2021-10-22 NOTE — ED TRIAGE NOTES
"Pt from home lives with her partner, pt has had increased confusion and multiple falls over past week. Pt baseline is confused normally can have conversation with intermittent confusion of words. EMS states partner was not good historian on explaining how confusion is different today just \"worse\".    Pt arrives in triage with mask on. Triage staff wearing N95 masks and goggles.     "

## 2021-10-22 NOTE — ED PROVIDER NOTES
I have supervised the care provided by the midlevel provider.    We have discussed this patient's history, physical exam, and treatment plan.   I have reviewed the note and have personally examined the patient and agree with the plan of care.  See attached attending note.  My personal findings are below:    Patient presents to the ED after falling x2 since last night.  Patient states she fell out of bed last night.  Her partner and found her laying on the kitchen floor around 3 PM this afternoon.  Patient does not remember what happened at that time.  Patient is also had increased confusion recently.  Denies cough, fever, shortness of breath, chest pain, abdominal pain, vomiting, diarrhea, or dysuria.    On exam: Awake, alert, oriented to self and place but not to day, month, or year.  Head is atraumatic.  There is mild tenderness of the cervical spine.  Heart is regular rate and rhythm.  Lungs are clear bilaterally.  Chest and abdomen are nontender.  Extremities are nontender with full range of motion.  Speech is normal.    Labs are unremarkable.  Imaging studies are pending.     Yao Gomez MD  10/22/21 2017

## 2021-10-23 LAB
AMPHET+METHAMPHET UR QL: NEGATIVE
ANION GAP SERPL CALCULATED.3IONS-SCNC: 10.6 MMOL/L (ref 5–15)
BARBITURATES UR QL SCN: NEGATIVE
BENZODIAZ UR QL SCN: POSITIVE
BUN SERPL-MCNC: 10 MG/DL (ref 8–23)
BUN/CREAT SERPL: 10.1 (ref 7–25)
CALCIUM SPEC-SCNC: 8.6 MG/DL (ref 8.6–10.5)
CANNABINOIDS SERPL QL: NEGATIVE
CHLORIDE SERPL-SCNC: 106 MMOL/L (ref 98–107)
CO2 SERPL-SCNC: 25.4 MMOL/L (ref 22–29)
COCAINE UR QL: NEGATIVE
CREAT SERPL-MCNC: 0.99 MG/DL (ref 0.57–1)
DEPRECATED RDW RBC AUTO: 46.1 FL (ref 37–54)
ERYTHROCYTE [DISTWIDTH] IN BLOOD BY AUTOMATED COUNT: 13.9 % (ref 12.3–15.4)
GFR SERPL CREATININE-BSD FRML MDRD: 55 ML/MIN/1.73
GLUCOSE SERPL-MCNC: 108 MG/DL (ref 65–99)
HCT VFR BLD AUTO: 33.5 % (ref 34–46.6)
HGB BLD-MCNC: 11.1 G/DL (ref 12–15.9)
MCH RBC QN AUTO: 30 PG (ref 26.6–33)
MCHC RBC AUTO-ENTMCNC: 33.1 G/DL (ref 31.5–35.7)
MCV RBC AUTO: 90.5 FL (ref 79–97)
METHADONE UR QL SCN: NEGATIVE
OPIATES UR QL: NEGATIVE
OXYCODONE UR QL SCN: NEGATIVE
PLATELET # BLD AUTO: 243 10*3/MM3 (ref 140–450)
PMV BLD AUTO: 9.2 FL (ref 6–12)
POTASSIUM SERPL-SCNC: 4.1 MMOL/L (ref 3.5–5.2)
RBC # BLD AUTO: 3.7 10*6/MM3 (ref 3.77–5.28)
SODIUM SERPL-SCNC: 142 MMOL/L (ref 136–145)
WBC # BLD AUTO: 4.61 10*3/MM3 (ref 3.4–10.8)

## 2021-10-23 PROCEDURE — 25010000002 ENOXAPARIN PER 10 MG: Performed by: INTERNAL MEDICINE

## 2021-10-23 PROCEDURE — G0378 HOSPITAL OBSERVATION PER HR: HCPCS

## 2021-10-23 PROCEDURE — 85027 COMPLETE CBC AUTOMATED: CPT | Performed by: INTERNAL MEDICINE

## 2021-10-23 PROCEDURE — 96372 THER/PROPH/DIAG INJ SC/IM: CPT

## 2021-10-23 PROCEDURE — 96361 HYDRATE IV INFUSION ADD-ON: CPT

## 2021-10-23 PROCEDURE — 90791 PSYCH DIAGNOSTIC EVALUATION: CPT

## 2021-10-23 PROCEDURE — 80048 BASIC METABOLIC PNL TOTAL CA: CPT | Performed by: INTERNAL MEDICINE

## 2021-10-23 RX ADMIN — PREGABALIN 150 MG: 75 CAPSULE ORAL at 08:48

## 2021-10-23 RX ADMIN — Medication 3 MG: at 22:56

## 2021-10-23 RX ADMIN — PANTOPRAZOLE SODIUM 40 MG: 40 TABLET, DELAYED RELEASE ORAL at 16:37

## 2021-10-23 RX ADMIN — LISINOPRIL 20 MG: 20 TABLET ORAL at 08:48

## 2021-10-23 RX ADMIN — SODIUM CHLORIDE 75 ML/HR: 9 INJECTION, SOLUTION INTRAVENOUS at 10:05

## 2021-10-23 RX ADMIN — Medication 1 TABLET: at 08:48

## 2021-10-23 RX ADMIN — Medication 100 MG: at 08:48

## 2021-10-23 RX ADMIN — FOLIC ACID 1 MG: 1 TABLET ORAL at 08:48

## 2021-10-23 RX ADMIN — PREGABALIN 150 MG: 75 CAPSULE ORAL at 21:18

## 2021-10-23 RX ADMIN — VILAZODONE HYDROCHLORIDE 40 MG: 40 TABLET ORAL at 08:48

## 2021-10-23 RX ADMIN — SODIUM CHLORIDE 75 ML/HR: 9 INJECTION, SOLUTION INTRAVENOUS at 22:57

## 2021-10-23 RX ADMIN — ACETAMINOPHEN 650 MG: 325 TABLET, FILM COATED ORAL at 16:36

## 2021-10-23 RX ADMIN — PANTOPRAZOLE SODIUM 40 MG: 40 TABLET, DELAYED RELEASE ORAL at 06:36

## 2021-10-23 RX ADMIN — ENOXAPARIN SODIUM 40 MG: 40 INJECTION SUBCUTANEOUS at 21:18

## 2021-10-23 NOTE — PLAN OF CARE
Problem: Adult Inpatient Plan of Care  Goal: Plan of Care Review  Outcome: Ongoing, Progressing  Flowsheets (Taken 10/23/2021 1831)  Progress: improving  Plan of Care Reviewed With: patient  Outcome Summary:   BP slightly elevated. Pt. dioriented to time and situation. RA, NS 75mL/Hr   tolerating well. C/o of lower abd pain, bladder scanned 600mL, straight cath took out 600mL. CIWA protocal maintained, negative. Purewick in place for urinary incontinence. Will continue to monitor.   Goal Outcome Evaluation:  Plan of Care Reviewed With: patient        Progress: improving  Outcome Summary: BP slightly elevated. Pt. dioriented to time and situation. RA, NS 75mL/Hr; tolerating well. C/o of lower abd pain, bladder scanned 600mL, straight cath took out 600mL. CIWA protocal maintained, negative. Purewick in place for urinary incontinence. Will continue to monitor.

## 2021-10-23 NOTE — CONSULTS
"Adult Nutrition  Assessment/PES    Patient Name:  Jaylyn Fuentes  YOB: 1950  MRN: [id number]  Admit Date:  10/22/2021    Assessment Date:  10/23/2021    Comments:  Consulted per [facility] protocol. Hx ETOH abuse. Admitted with frequent falls, confusion. Pt on regular diet, eating 100%. On appropriate vit/min supplements given her ETOH hx. No intervention warranted at present, will follow.      Reason for Assessment     Row Name 10/23/21 1108          Reason for Assessment    Reason For Assessment physician consult  [facility] protocol     Diagnosis substance use/abuse; other (see comments); psychosocial  Admitted with confusion and frequent falls; she fell out of bed and her partner found her on the floor; hx ETOH abuse     Identified At Risk by Screening Criteria no indicators present                Nutrition/Diet History     Row Name 10/23/21 1107          Nutrition/Diet History    Typical Food/Fluid Intake Ate 100% breakfast                Anthropometrics     Row Name 10/23/21 1107 10/23/21 0555       Anthropometrics    Height 162.6 cm (64\") --    Weight -- 99.9 kg (220 lb 3.8 oz)       Admit Weight    Admit Weight 99.8 kg (220 lb) --       Ideal Body Weight (IBW)    Ideal Body Weight (IBW) (kg) 55 --    % of Ideal Body Weight Assessment other (see comments)  173% --       Body Mass Index (BMI)    BMI Assessment BMI 35-39.9: obesity grade II  36 --               Labs/Tests/Procedures/Meds     Row Name 10/23/21 1108          Labs/Procedures/Meds    Lab Results Reviewed reviewed     Lab Results Comments Hgb, +benzos            Diagnostic Tests/Procedures    Diagnostic Test/Procedure Reviewed reviewed     Diagnostic Test/Procedures Comments CT of head, spine (wnl)            Medications    Pertinent Medications Reviewed reviewed     Pertinent Medications Comments folic acid, mvi, protonix, lyrica, thiamine, IVF 75 cc/hr                Physical Findings     Row Name 10/23/21 1100          Physical Findings " "   Overall Physical Appearance obese     Skin other (see comments)  intact                Estimated/Assessed Needs     Row Name 10/23/21 1107          Calculation Measurements    Height 162.6 cm (64\")                Nutrition Prescription Ordered     Row Name 10/23/21 1109          Nutrition Prescription PO    Current PO Diet Regular     Fluid Consistency Thin     Common Modifiers Cardiac                Evaluation of Received Nutrient/Fluid Intake     Row Name 10/23/21 1109          PO Evaluation    Number of Meals 1     % PO Intake 100%                     Problem/Interventions:   Problem 1     Row Name 10/23/21 1110          Nutrition Diagnoses Problem 1    Problem 1 Nutrition Appropriate for Condition at this Time                      Intervention Goal     Row Name 10/23/21 1110          Intervention Goal    General Disease management/therapy     PO Maintain intake                Nutrition Intervention     Row Name 10/23/21 1110          Nutrition Intervention    RD/Tech Action Follow Tx progress; Care plan reviewd                  Education/Evaluation     Row Name 10/23/21 1110          Education    Education No discharge needs identified at this time; Will Instruct as appropriate            Monitor/Evaluation    Monitor Per protocol                 Electronically signed by:  Ruth López RD  10/23/21 11:10 EDT  "
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
## 2021-10-23 NOTE — PLAN OF CARE
"Goal Outcome Evaluation:  Plan of Care Reviewed With: patient           Outcome Summary: Pt admitted for frequent falls and \"increased confusion.\" Partner states that the pt is normally confused intermittently and gets her words mixed up but felt that she has been more confused for the past week. Pt also admits to drinking \"a beer\" everyday, CIWA negative. Urine drug screen resulted. IVF continued. Access to see. Purewick in place for incontience. Awaiting am labs.  "

## 2021-10-23 NOTE — PROGRESS NOTES
Name: Jaylyn Fuentes ADMIT: 10/22/2021   : 1950  PCP: Annie Romero MD    MRN: [id number] LOS: 0 days   AGE/SEX: 71 y.o. female  ROOM: [facility]     Subjective   Subjective   Patient is lying on the bed and is in no major distress.  Denies nausea, vomiting, abdominal pain, chest pain.       Objective   Objective   Vital Signs  Temp:  [97.9 °F (36.6 °C)-98.5 °F (36.9 °C)] 98.5 °F (36.9 °C)  Heart Rate:  [66-89] 80  Resp:  [16-18] 18  BP: (116-158)/() 151/91  SpO2:  [94 %-99 %] 94 %  on   ;   Device (Oxygen Therapy): room air  Body mass index is 37.8 kg/m².  Physical Exam  Constitutional:       Appearance: Normal appearance. She is obese.   HENT:      Head: Normocephalic and atraumatic.      Nose: Nose normal.      Mouth/Throat:      Mouth: Mucous membranes are moist.   Eyes:      Extraocular Movements: Extraocular movements intact.      Conjunctiva/sclera: Conjunctivae normal.      Pupils: Pupils are equal, round, and reactive to light.   Cardiovascular:      Rate and Rhythm: Normal rate and regular rhythm.      Pulses: Normal pulses.      Heart sounds: Normal heart sounds. No murmur heard.      Pulmonary:      Effort: Pulmonary effort is normal. No respiratory distress.      Breath sounds: Normal breath sounds.   Abdominal:      General: Bowel sounds are normal. There is no distension.      Palpations: Abdomen is soft.      Tenderness: There is no abdominal tenderness.   Musculoskeletal:      Cervical back: Normal range of motion and neck supple.      Right lower leg: No edema.      Left lower leg: No edema.   Skin:     General: Skin is warm and dry.      Coloration: Skin is not jaundiced.   Neurological:      General: No focal deficit present.      Mental Status: She is alert and oriented to person, place, and time. Mental status is at baseline.   Psychiatric:         Mood and Affect: Mood normal.         Thought Content: Thought content normal.         Judgment: Judgment normal.          Results Review     I reviewed the patient's new clinical results.  Results from last 7 days   Lab Units 10/23/21  0554 10/22/21  1646   WBC 10*3/mm3 4.61 5.93   HEMOGLOBIN g/dL 11.1* 11.1*   PLATELETS 10*3/mm3 243 249     Results from last 7 days   Lab Units 10/23/21  0820 10/22/21  1646   SODIUM mmol/L 142 142   POTASSIUM mmol/L 4.1 4.2   CHLORIDE mmol/L 106 106   CO2 mmol/L 25.4 29.5*   BUN mg/dL 10 10   CREATININE mg/dL 0.99 1.07*   GLUCOSE mg/dL 108* 124*   Estimated Creatinine Clearance: 59 mL/min (by C-G formula based on SCr of 0.99 mg/dL).  Results from last 7 days   Lab Units 10/22/21  1646   ALBUMIN g/dL 3.40*   BILIRUBIN mg/dL 0.3   ALK PHOS U/L 70   AST (SGOT) U/L 19   ALT (SGPT) U/L 12     Results from last 7 days   Lab Units 10/23/21  0820 10/22/21  1646   CALCIUM mg/dL 8.6 8.5*   ALBUMIN g/dL  --  3.40*       COVID19   Date Value Ref Range Status   10/22/2021 Not Detected Not Detected - Ref. Range Final   10/12/2021 Not Detected Not Detected - Ref. Range Final     No results found for: HGBA1C, POCGLU    CT Head Without Contrast, CT Cervical Spine Without Contrast  Narrative: CT HEAD WO CONTRAST-, CT CERVICAL SPINE WO CONTRAST-     INDICATIONS: Trauma     TECHNIQUE: Radiation dose reduction techniques were utilized, including  automated exposure control and exposure modulation based on body size.  Noncontrast head CT, cervical spine CT     COMPARISON: None available     FINDINGS:     Head CT:     No acute intracranial hemorrhage, midline shift or mass effect. No acute  territorial infarct is identified. Bilateral basal ganglia  mineralization is present.     Mild periventricular hypodensities suggest chronic small vessel ischemic  change in a patient this age.           Ventricles, cisterns, cerebral sulci are unremarkable for patient age.     Mild paranasal sinus mucosal thickening is noted. The visualized  paranasal sinuses, orbits, mastoid air cells are otherwise unremarkable.  Circumscribed  subcutaneous densities at the anterior upper scalp, about  1 cm on axial image 42, and another measuring 0.9 cm on image 47, could  be a sebaceous cysts, correlate with clinical exam.           Cervical spine CT:     No acute fracture or malalignment is noted. Anterior plate and screw  fusion hardware is seen at C3, C5, with intervening corpectomy cage.     A calcified nodule is apparent in the partly included right upper lung,  as well as adjacent partly included indeterminate nodule in the lower  most image, axial image 141 (measures 4 mm to extent partly included),  CT follow-up recommended.                 Impression:    1. Partly included nonspecific pulmonary nodule, follow-up recommended.  2. No acute intracranial hemorrhage or hydrocephalus. No acute cervical  fracture identified; degenerative and surgical changes of the cervical  spine. If there is further clinical concern, MRI could be considered for  further evaluation.     This report was finalized on 10/22/2021 6:22 PM by Dr. Serg Figueroa M.D.     XR Chest 1 View  PORTABLE CHEST 10/22/2021 AT 4:23 PM     CLINICAL HISTORY: Confusion.     Compared to a previous chest dated 09/15/2021.     The lungs appear fairly well-expanded and are free of focal infiltrates.  There are no pleural effusions. The heart is mildly prominent.. A left  shoulder arthroplasty is noted.     IMPRESSIONS: Mild cardiomegaly. No evidence of acute disease within the  chest.     This report was finalized on 10/22/2021 4:47 PM by Dr. Gaston Driscoll M.D.       Scheduled Medications  thiamine, 100 mg, Oral, Daily   And  multivitamin, 1 tablet, Oral, Daily   And  folic acid, 1 mg, Oral, Daily  lisinopril, 20 mg, Oral, Daily  pantoprazole, 40 mg, Oral, BID AC  pregabalin, 150 mg, Oral, Q12H  vilazodone, 40 mg, Oral, Daily    Infusions  sodium chloride, 75 mL/hr, Last Rate: 75 mL/hr (10/23/21 1005)    Diet  Diet Regular; Cardiac       Assessment/Plan     Active Hospital Problems     Diagnosis  POA   • Frequent falls [R29.6]  Not Applicable      Resolved Hospital Problems   No resolved problems to display.       71 y.o. female admitted with <principal problem not specified>.    1. Episodes of altered mental status with balance issues, reportedly she has been having these episodes at home and significant other is quite concerned.  Requested neurology evaluation which is appropriate.  CT brain and cervical spine did not reveal any acute findings.  2.  History of ethanol abuse, patient states currently she does not drink alcohol however will monitor for withdrawals.  3.  Hypertension, on lisinopril and will be continued.  4.  Neuropathy, on Lyrica.  5.  On Lovenox for DVT prophylaxis.  6.  CODE STATUS is full code.      Yogi Morales MD  [facility]  10/23/21  18:32 EDT

## 2021-10-24 ENCOUNTER — APPOINTMENT (OUTPATIENT)
Dept: MRI IMAGING | Facility: HOSPITAL | Age: 71
End: 2021-10-24

## 2021-10-24 LAB
ANION GAP SERPL CALCULATED.3IONS-SCNC: 10.2 MMOL/L (ref 5–15)
BASOPHILS # BLD AUTO: 0.02 10*3/MM3 (ref 0–0.2)
BASOPHILS NFR BLD AUTO: 0.4 % (ref 0–1.5)
BUN SERPL-MCNC: 40 MG/DL (ref 8–23)
BUN/CREAT SERPL: 36.4 (ref 7–25)
CALCIUM SPEC-SCNC: 8.6 MG/DL (ref 8.6–10.5)
CHLORIDE SERPL-SCNC: 100 MMOL/L (ref 98–107)
CO2 SERPL-SCNC: 27.8 MMOL/L (ref 22–29)
CREAT SERPL-MCNC: 1.1 MG/DL (ref 0.57–1)
DEPRECATED RDW RBC AUTO: 46.5 FL (ref 37–54)
EOSINOPHIL # BLD AUTO: 0.17 10*3/MM3 (ref 0–0.4)
EOSINOPHIL NFR BLD AUTO: 3.2 % (ref 0.3–6.2)
ERYTHROCYTE [DISTWIDTH] IN BLOOD BY AUTOMATED COUNT: 13.7 % (ref 12.3–15.4)
GFR SERPL CREATININE-BSD FRML MDRD: 49 ML/MIN/1.73
GLUCOSE SERPL-MCNC: 101 MG/DL (ref 65–99)
HCT VFR BLD AUTO: 37.4 % (ref 34–46.6)
HGB BLD-MCNC: 11.7 G/DL (ref 12–15.9)
IMM GRANULOCYTES # BLD AUTO: 0.02 10*3/MM3 (ref 0–0.05)
IMM GRANULOCYTES NFR BLD AUTO: 0.4 % (ref 0–0.5)
LYMPHOCYTES # BLD AUTO: 1.42 10*3/MM3 (ref 0.7–3.1)
LYMPHOCYTES NFR BLD AUTO: 26.4 % (ref 19.6–45.3)
MCH RBC QN AUTO: 29.1 PG (ref 26.6–33)
MCHC RBC AUTO-ENTMCNC: 31.3 G/DL (ref 31.5–35.7)
MCV RBC AUTO: 93 FL (ref 79–97)
MONOCYTES # BLD AUTO: 0.58 10*3/MM3 (ref 0.1–0.9)
MONOCYTES NFR BLD AUTO: 10.8 % (ref 5–12)
NEUTROPHILS NFR BLD AUTO: 3.16 10*3/MM3 (ref 1.7–7)
NEUTROPHILS NFR BLD AUTO: 58.8 % (ref 42.7–76)
NRBC BLD AUTO-RTO: 0 /100 WBC (ref 0–0.2)
PLATELET # BLD AUTO: 181 10*3/MM3 (ref 140–450)
PMV BLD AUTO: 10.7 FL (ref 6–12)
POTASSIUM SERPL-SCNC: 3.6 MMOL/L (ref 3.5–5.2)
RBC # BLD AUTO: 4.02 10*6/MM3 (ref 3.77–5.28)
SODIUM SERPL-SCNC: 138 MMOL/L (ref 136–145)
WBC # BLD AUTO: 5.37 10*3/MM3 (ref 3.4–10.8)

## 2021-10-24 PROCEDURE — G0378 HOSPITAL OBSERVATION PER HR: HCPCS

## 2021-10-24 PROCEDURE — 96372 THER/PROPH/DIAG INJ SC/IM: CPT

## 2021-10-24 PROCEDURE — 85025 COMPLETE CBC W/AUTO DIFF WBC: CPT | Performed by: INTERNAL MEDICINE

## 2021-10-24 PROCEDURE — 97162 PT EVAL MOD COMPLEX 30 MIN: CPT

## 2021-10-24 PROCEDURE — 25010000002 LORAZEPAM PER 2 MG: Performed by: PSYCHIATRY & NEUROLOGY

## 2021-10-24 PROCEDURE — 96361 HYDRATE IV INFUSION ADD-ON: CPT

## 2021-10-24 PROCEDURE — 25010000002 THIAMINE PER 100 MG: Performed by: PSYCHIATRY & NEUROLOGY

## 2021-10-24 PROCEDURE — A9577 INJ MULTIHANCE: HCPCS | Performed by: INTERNAL MEDICINE

## 2021-10-24 PROCEDURE — 97110 THERAPEUTIC EXERCISES: CPT

## 2021-10-24 PROCEDURE — 96374 THER/PROPH/DIAG INJ IV PUSH: CPT

## 2021-10-24 PROCEDURE — 0 GADOBENATE DIMEGLUMINE 529 MG/ML SOLUTION: Performed by: INTERNAL MEDICINE

## 2021-10-24 PROCEDURE — 99203 OFFICE O/P NEW LOW 30 MIN: CPT | Performed by: PSYCHIATRY & NEUROLOGY

## 2021-10-24 PROCEDURE — 80048 BASIC METABOLIC PNL TOTAL CA: CPT | Performed by: INTERNAL MEDICINE

## 2021-10-24 PROCEDURE — 70553 MRI BRAIN STEM W/O & W/DYE: CPT

## 2021-10-24 PROCEDURE — 25010000002 ENOXAPARIN PER 10 MG: Performed by: INTERNAL MEDICINE

## 2021-10-24 RX ORDER — OXYBUTYNIN CHLORIDE 5 MG/1
5 TABLET, EXTENDED RELEASE ORAL DAILY
Status: DISCONTINUED | OUTPATIENT
Start: 2021-10-24 | End: 2021-10-26 | Stop reason: HOSPADM

## 2021-10-24 RX ORDER — PANTOPRAZOLE SODIUM 40 MG/1
40 TABLET, DELAYED RELEASE ORAL EVERY MORNING
Refills: 0 | Status: DISCONTINUED | OUTPATIENT
Start: 2021-10-24 | End: 2021-10-26 | Stop reason: HOSPADM

## 2021-10-24 RX ORDER — OLANZAPINE 2.5 MG/1
2.5 TABLET ORAL
Status: DISCONTINUED | OUTPATIENT
Start: 2021-10-24 | End: 2021-10-26 | Stop reason: HOSPADM

## 2021-10-24 RX ORDER — DIPHENOXYLATE HYDROCHLORIDE AND ATROPINE SULFATE 2.5; .025 MG/1; MG/1
1 TABLET ORAL 4 TIMES DAILY PRN
Status: DISCONTINUED | OUTPATIENT
Start: 2021-10-24 | End: 2021-10-26 | Stop reason: HOSPADM

## 2021-10-24 RX ORDER — CETIRIZINE HYDROCHLORIDE 10 MG/1
10 TABLET ORAL DAILY
Status: DISCONTINUED | OUTPATIENT
Start: 2021-10-24 | End: 2021-10-26 | Stop reason: HOSPADM

## 2021-10-24 RX ORDER — SUMATRIPTAN 50 MG/1
50 TABLET, FILM COATED ORAL DAILY PRN
Status: DISCONTINUED | OUTPATIENT
Start: 2021-10-24 | End: 2021-10-25

## 2021-10-24 RX ORDER — LORAZEPAM 2 MG/ML
1 INJECTION INTRAMUSCULAR ONCE
Status: COMPLETED | OUTPATIENT
Start: 2021-10-24 | End: 2021-10-24

## 2021-10-24 RX ORDER — PRAVASTATIN SODIUM 20 MG
20 TABLET ORAL DAILY
Status: DISCONTINUED | OUTPATIENT
Start: 2021-10-24 | End: 2021-10-26 | Stop reason: HOSPADM

## 2021-10-24 RX ADMIN — GADOBENATE DIMEGLUMINE 20 ML: 529 INJECTION, SOLUTION INTRAVENOUS at 16:44

## 2021-10-24 RX ADMIN — Medication 100 MG: at 09:09

## 2021-10-24 RX ADMIN — CETIRIZINE HYDROCHLORIDE 10 MG: 10 TABLET ORAL at 11:33

## 2021-10-24 RX ADMIN — Medication 1 TABLET: at 09:09

## 2021-10-24 RX ADMIN — ENOXAPARIN SODIUM 40 MG: 40 INJECTION SUBCUTANEOUS at 20:03

## 2021-10-24 RX ADMIN — ACETAMINOPHEN 650 MG: 325 TABLET, FILM COATED ORAL at 20:04

## 2021-10-24 RX ADMIN — THIAMINE HYDROCHLORIDE 200 MG: 100 INJECTION, SOLUTION INTRAMUSCULAR; INTRAVENOUS at 19:06

## 2021-10-24 RX ADMIN — CLONIDINE HYDROCHLORIDE 0.1 MG: 0.1 TABLET ORAL at 01:00

## 2021-10-24 RX ADMIN — CALCIUM CARBONATE-VITAMIN D TAB 500 MG-200 UNIT 500 MG: 500-200 TAB at 11:33

## 2021-10-24 RX ADMIN — PANTOPRAZOLE SODIUM 40 MG: 40 TABLET, DELAYED RELEASE ORAL at 11:33

## 2021-10-24 RX ADMIN — PREGABALIN 150 MG: 75 CAPSULE ORAL at 09:12

## 2021-10-24 RX ADMIN — PANTOPRAZOLE SODIUM 40 MG: 40 TABLET, DELAYED RELEASE ORAL at 06:41

## 2021-10-24 RX ADMIN — FOLIC ACID 1 MG: 1 TABLET ORAL at 09:09

## 2021-10-24 RX ADMIN — PREGABALIN 150 MG: 75 CAPSULE ORAL at 20:03

## 2021-10-24 RX ADMIN — LISINOPRIL 20 MG: 20 TABLET ORAL at 09:09

## 2021-10-24 RX ADMIN — OXYBUTYNIN CHLORIDE 5 MG: 5 TABLET, EXTENDED RELEASE ORAL at 11:33

## 2021-10-24 RX ADMIN — VILAZODONE HYDROCHLORIDE 40 MG: 40 TABLET ORAL at 09:09

## 2021-10-24 RX ADMIN — CALCIUM CARBONATE-VITAMIN D TAB 500 MG-200 UNIT 500 MG: 500-200 TAB at 20:01

## 2021-10-24 RX ADMIN — OLANZAPINE 2.5 MG: 2.5 TABLET ORAL at 15:24

## 2021-10-24 RX ADMIN — OLANZAPINE 2.5 MG: 2.5 TABLET ORAL at 19:06

## 2021-10-24 RX ADMIN — PRAVASTATIN SODIUM 20 MG: 20 TABLET ORAL at 11:33

## 2021-10-24 RX ADMIN — LORAZEPAM 1 MG: 2 INJECTION INTRAMUSCULAR; INTRAVENOUS at 15:58

## 2021-10-24 RX ADMIN — SODIUM CHLORIDE 75 ML/HR: 9 INJECTION, SOLUTION INTRAVENOUS at 21:07

## 2021-10-24 NOTE — CONSULTS
"  Patient Identification:  NAME:  Jaylyn Fuentes  Age:  71 y.o.   Sex:  female   :  1950   MRN:  [id number]       Chief complaint: Does not have one, reason for consult confusion change in mental status    History of present illness: Patient is 71-year-old right-handed white female with a history of call abuse per the chart arthritis hypertension hyperlipidemia who comes to the hospital after multiple falls and increasing confusion context patient is CT of the head by my independent eyeball review shows atrophy only associated symptoms she states she has hallucinations she gets a \"story going in her head\".  And sees things she has been seeing some things on the TV today and the nurses note that she is intermittently confused about things.  Duration of this is not really known but apparently worse over the last week or so quality is as described she has hallucinations confusion and has had some falls location is not pertinent      Past medical history:  Past Medical History:   Diagnosis Date   • Abdominal pain    • Abnormal CT of the abdomen    • Abnormal glucose    • Abnormal liver enzymes    • Abnormal urinalysis    • Achilles tendinitis     left   • Acid reflux disease    • Acromioclavicular joint arthritis    • Acromioclavicular joint pain, bilateral    • Aftercare following knee joint replacement surgery    • Alcohol abuse    • Alternating constipation and diarrhea    • Anemia    • Arthralgia of multiple sites    • Arthritis    • BMI 34.0-34.9,adult    • Carpal tunnel syndrome    • Cataract    • Cellulitis of extremity    • Chronic insomnia    • Chronic kidney disease, stage III (moderate) ([facility])    • Contact dermatitis    • Contusion of bone    • Cough    • Decreased range of motion    • Degeneration of cervical intervertebral disc    • Depressed    • Dermatitis    • Diarrhea    • Dry eye    • Dysphagia    • Edema of extremities    • Esophagitis    • Fatigue    • Flu vaccine need    • GERD " (gastroesophageal reflux disease)    • Glenohumeral arthritis, left    • Hemorrhoids    • Hiatal hernia    • Hip pain    • History of colonic polyps    • History of DVT in adulthood    • History of transfusion    • Hyperactivity of bladder    • Hyperlipidemia    • Hypertension    • Hypokalemia    • Irritable bowel syndrome with diarrhea    • Lactose intolerance    • Left ankle pain    • Leg cramps    • Leg swelling    • Limitation of joint motion of ankle, left    • Lower extremity tendinopathy    • Medicare annual wellness visit, subsequent    • Methicillin resistant Staphylococcus aureus infection    • Microscopic hematuria    • Migraine    • Obstructive sleep apnea    • Osteoarthritis, chronic    • Pain, foot, left, chronic    • Pancreatitis, chronic (HCC)    • Pes planus    • PONV (postoperative nausea and vomiting)     SCOPOLAMINE HELPS-USED DURING LAST SURGERY    • Rectal leakage    • Retrocalcaneal bursitis    • Right ankle instability    • Right ankle pain    • Right ankle sprain    • Right ankle swelling    • Rotator cuff tear, left    • S/P shoulder surgery    • Shoulder region pain    • Spinal stenosis    • Swallowing difficulty    • Urge incontinence of urine    • Urinary incontinence    • Visit for screening mammogram    • Wears contact lenses    • Wears dentures     UPPER AND LOWER    • Wears hearing aid     DOESN'T HAVE WITH HER   • Weight gain        Past surgical history:  Past Surgical History:   Procedure Laterality Date   • BACK SURGERY      LUMBAR FUSION    • CERVICAL FUSION      plates, rods, and screws   • CHOLECYSTECTOMY     • COLONOSCOPY      2 YEARS AGO    • ENDOSCOPY N/A 10/14/2021    Procedure: ESOPHAGOGASTRODUODENOSCOPY with biopsy and polypectomy;  Surgeon: Hussain Basilio MD;  Location: [facility] OR;  Service: Gastroenterology;  Laterality: N/A;  gastritis and polyps   • EYE SURGERY      LASER FOR KERITONOSIS    • GALLBLADDER SURGERY     • HERNIA REPAIR     • HYSTERECTOMY     •  INGUINAL HERNIA REPAIR     • JOINT REPLACEMENT      BILATERAL KNEES, BILATERAL HIPS   • NECK SURGERY     • IL RECONSTR TOTAL SHOULDER IMPLANT Left 2/13/2017    Procedure: TOTAL SHOULDER REPLACEMENT LEFT;  Surgeon: Benigno Chavez MD;  Location: [facility];  Service: Orthopedics   • REPLACEMENT TOTAL KNEE     • SHOULDER LIGAMENT REPAIR Right    • TONSILLECTOMY     • TOTAL HIP ARTHROPLASTY     • UPPER GASTROINTESTINAL ENDOSCOPY         Allergies:  Baclofen, Keflex [cephalexin], and Sulfa antibiotics    Home medications:  Medications Prior to Admission   Medication Sig Dispense Refill Last Dose   • albuterol sulfate  (90 Base) MCG/ACT inhaler Inhale 2 puffs Every 6 (Six) Hours As Needed.      • Bismuth 262 MG chewable tablet Chew 2 tablets 4 (Four) Times a Day. 112 tablet 0    • cyclobenzaprine (FLEXERIL) 10 MG tablet Daily As Needed.      • dexlansoprazole (Dexilant) 60 MG capsule Do not crush or chew.      • diphenoxylate-atropine (LOMOTIL) 2.5-0.025 MG per tablet Take 1 tablet by mouth 4 (Four) Times a Day As Needed for diarrhea.      • fesoterodine fumarate (TOVIAZ ER) 8 MG tablet sustained-release 24 hour capsule Take  by mouth Daily.      • HYDROcodone-acetaminophen (NORCO)  MG per tablet Take 1 tablet by mouth Every 8 (Eight) Hours As Needed.      • lisinopril (PRINIVIL,ZESTRIL) 20 MG tablet Take 20 mg by mouth Daily.      • metroNIDAZOLE (FLAGYL) 250 MG tablet Take 1 tablet by mouth 4 (Four) Times a Day for 14 days. 56 tablet 0    • omeprazole (priLOSEC) 40 MG capsule Take 1 capsule by mouth 2 (Two) Times a Day for 14 days. 28 capsule 0    • ondansetron (ZOFRAN) 8 MG tablet Take 1 tablet by mouth Every 8 (Eight) Hours As Needed for Vomiting. 15 tablet 0 Past Month at Unknown time   • pravastatin (PRAVACHOL) 20 MG tablet Take 20 mg by mouth Daily.      • pregabalin (LYRICA) 75 MG capsule Take 150 mg by mouth 2 (Two) Times a Day. 300mg PO at night      • tetracycline (ACHROMYCIN,SUMYCIN) 500 MG  "capsule Take 1 capsule by mouth 4 (Four) Times a Day. 56 capsule 0    • vilazodone (VIIBRYD) 40 MG tablet tablet Take 40 mg by mouth Daily.      • ZOLMitriptan (ZOMIG) 5 MG tablet Take 5 mg by mouth 1 (One) Time As Needed for migraine.      • calcium carbonate-cholecalciferol 500-400 MG-UNIT tablet tablet Take  by mouth Daily.      • cetirizine (zyrTEC) 10 MG tablet Take 10 mg by mouth Daily.           Hospital medications:  calcium-vitamin D, 500 mg, Oral, BID  cetirizine, 10 mg, Oral, Daily  enoxaparin, 40 mg, Subcutaneous, Nightly  thiamine, 100 mg, Oral, Daily   And  multivitamin, 1 tablet, Oral, Daily   And  folic acid, 1 mg, Oral, Daily  lisinopril, 20 mg, Oral, Daily  oxybutynin XL, 5 mg, Oral, Daily  pantoprazole, 40 mg, Oral, QAM  pravastatin, 20 mg, Oral, Daily  pregabalin, 150 mg, Oral, Q12H  vilazodone, 40 mg, Oral, Daily      sodium chloride, 75 mL/hr, Last Rate: 75 mL/hr (10/24/21 1134)      •  acetaminophen  •  cloNIDine  •  diphenoxylate-atropine  •  melatonin  •  nitroglycerin  •  ondansetron **OR** ondansetron    Family history:  Family History   Problem Relation Age of Onset   • Arthritis Other    • Colonic polyp Other    • Coronary artery disease Other        Social history:  Social History     Tobacco Use   • Smoking status: Former Smoker   • Smokeless tobacco: Never Used   Vaping Use   • Vaping Use: Never used   Substance Use Topics   • Alcohol use: Yes     Comment: [employer] SOCIALLY    • Drug use: No       Review of systems:    He cannot really give me much in the way of review of systems, family is not present I reviewed the chart fully for the review of systems    Objective:  Vitals Ranges:   Temp:  [97.8 °F (36.6 °C)-98.5 °F (36.9 °C)] 98 °F (36.7 °C)  Heart Rate:  [60-89] 69  Resp:  [12-18] 18  BP: (140-177)/() 156/108      Physical Exam:  Awake alert and eventually oriented x3.  She apparently called a \"[employer]\" earlier but she corrects herself this time fund of knowledge " poor attention span concentration good bruit good recent and remote memory fair language function normal elderly obese in no distress pupils 2 with mild reaction bilaterally extraocular movements are full horizontally and pretty good upgaze nasolabial folds palate tongue symmetrical normal hearing facial sensation head turning shoulder shrug motor she is wanting to get out of bed to go to the restroom and is constantly talking throughout this exam but I think her motor exam is grossly normal I think her strength is about 5 out of 5 in 4 extremities there is no cogwheeling rigidity no resting tremor no atrophy or fasciculations reflexes trace throughout symmetrical toes definitely downgoing bilaterally sensation normal light touch face both arms and both legs coordination normal in the upper extremities Station and gait I was afraid to check for fear that I would let her fall heart is regular without murmur neck supple without bruits extremities no clubbing cyanosis or significant edema visual acuity normal at 3 feet    Results review:   I reviewed the patient's new clinical results.    Data review:  Lab Results (last 24 hours)     Procedure Component Value Units Date/Time    CBC & Differential [[id number]]  (Abnormal) Collected: 10/24/21 0524    Specimen: Blood Updated: 10/24/21 0812    Narrative:      The following orders were created for panel order CBC & Differential.  Procedure                               Abnormality         Status                     ---------                               -----------         ------                     CBC Auto Differential[[id number]]        Abnormal            Final result                 Please view results for these tests on the individual orders.    CBC Auto Differential [[id number]]  (Abnormal) Collected: 10/24/21 0524    Specimen: Blood Updated: 10/24/21 0812     WBC 5.37 10*3/mm3      RBC 4.02 10*6/mm3      Hemoglobin 11.7 g/dL      Hematocrit 37.4 %      MCV 93.0 fL      " MCH 29.1 pg      MCHC 31.3 g/dL      RDW 13.7 %      RDW-SD 46.5 fl      MPV 10.7 fL      Platelets 181 10*3/mm3      Neutrophil % 58.8 %      Lymphocyte % 26.4 %      Monocyte % 10.8 %      Eosinophil % 3.2 %      Basophil % 0.4 %      Immature Grans % 0.4 %      Neutrophils, Absolute 3.16 10*3/mm3      Lymphocytes, Absolute 1.42 10*3/mm3      Monocytes, Absolute 0.58 10*3/mm3      Eosinophils, Absolute 0.17 10*3/mm3      Basophils, Absolute 0.02 10*3/mm3      Immature Grans, Absolute 0.02 10*3/mm3      nRBC 0.0 /100 WBC     Basic Metabolic Panel [[id number]]  (Abnormal) Collected: 10/24/21 0524    Specimen: Blood Updated: 10/24/21 0650     Glucose 101 mg/dL      BUN 40 mg/dL      Creatinine 1.10 mg/dL      Sodium 138 mmol/L      Potassium 3.6 mmol/L      Comment: Slight hemolysis detected by analyzer. Results may be affected.        Chloride 100 mmol/L      CO2 27.8 mmol/L      Calcium 8.6 mg/dL      eGFR Non African Amer 49 mL/min/1.73      BUN/Creatinine Ratio 36.4     Anion Gap 10.2 mmol/L     Narrative:      GFR Normal >60  Chronic Kidney Disease <60  Kidney Failure <15             Imaging:  Imaging Results (Last 24 Hours)     ** No results found for the last 24 hours. **         PPE worn at all times washed before washed afterwards disposed of everything properly was now within 6 feet of her for more than few minutes during my exam no aerosols used at any point    Assessment and Plan:     This patient appears to have some degree of dementia likely vascular related to her history of hypertension.  Does not appear to have Parkinson's disease or significant parkinsonism so I do not have a good reason for her falls that she has been having.  Would like to check an MRI of the brain since that will absolutely rule out or rule in an acute stroke in this patient and also rule out anything else acute intracranially.  She notes that she has \"hallucinations, like a story that goes through her head\" and this is " consistent with psychosis and I am going to start her on a ultra tiny dose of a antipsychotic medication that will definitely help get rid of her hallucinations and that type of confusion.  Note that Zyprexa is a very QT interval friendly drug and her QT interval is normal/short anyway benefits greatly outweigh any risks.  I am aware she is a drinker but I am not convinced that she is going through alcohol withdrawal but I will give her some IV thiamine now.  I have not written for benzodiazepines other than pre-MRI scan..   See how she does with the tiny dose of Zyprexa  and see what the  MRI scan shows thanks      Roverto Olson MD  10/24/21  13:22 EDT

## 2021-10-24 NOTE — PLAN OF CARE
Goal Outcome Evaluation:  Plan of Care Reviewed With: patient           Outcome Summary: 72yo obese white female admitted 10/22/21 due to altered mental status and falling at home. PMH includes sh DJD and total sh replacement, hbp, depression, sleep apnea. PLOF: Pt lives at home with sig other and was independent iw mobility and ADLs. She is primarily limited at this time by generalized weakness and decreased activity tolerance that limit her functional activities. Today, she was eager to participate in PT. She is pleasantly confused at times. She req CGA for bed mobility and sit/stand from EOB. Pt amb 140' with r wx (10' without AD) with CGA and vc for posture. Pt advised to use wx at all times at home glo at night. Pt fatigues with amb. She would benefit from skilled PT services for ther ex, gt/transfer training, bed mobility, balance, and endurance as tolerated to prepare for d/c home. Mental status may limit rehab potential. Recommend home health services upon d/c  Patient was intermittently wearing a face mask during this therapy encounter. Therapist used appropriate personal protective equipment including eye protection, mask, and gloves.  Mask used was standard procedure mask. Appropriate PPE was worn during the entire therapy session. Hand hygiene was completed before and after therapy session. Patient is not in enhanced droplet precautions.

## 2021-10-24 NOTE — PLAN OF CARE
Problem: Adult Inpatient Plan of Care  Goal: Plan of Care Review  Outcome: Ongoing, Progressing  Flowsheets (Taken 10/24/2021 1715)  Progress: improving  Plan of Care Reviewed With: patient  Outcome Summary:   BP elevated, rest of VSS. Worked with PT today and sat in chair. MRI completed. Alterted mental status persistent   disoriented to time and situation. CIWA negative. NS 75 mL/hr. C/o of migraine given PRN imitrex.  RA, NS. Will continue to monitor.   Goal Outcome Evaluation:  Plan of Care Reviewed With: patient        Progress: improving  Outcome Summary: BP elevated, rest of VSS. Worked with PT today and sat in chair. MRI completed. Alterted mental status persistent; disoriented to time and situation. CIWA negative. NS 75 mL/hr. C/o of migraine given PRN imitrex.  RA, NS. Will continue to monitor.

## 2021-10-24 NOTE — DISCHARGE PLACEMENT REQUEST
"Jaylyn uGzmán (71 y.o. Female)             Date of Birth Social Security Number Address Home Phone MRN    1950  [address] [place] [address] [phone] [id number]    M[employer]onism Marital Status             Yaz[place] Single       Admission Date Admission Type Admitting Provider Attending Provider Department, Room/Bed    10/22/21 Emergency StingNoris benson MD Reddy, Rahul Kandada, MD [address], [address]    Discharge Date Discharge Disposition Discharge Destination                         Attending Provider: Yogi Morales MD    Allergies: Baclofen, Keflex [Cephalexin], Sulfa Antibiotics    Isolation: None   Infection: None   Code Status: CPR   Advance Care Planning Activity    Ht: 162.6 cm (64\")   Wt: 99.5 kg (219 lb 6.4 oz)    Admission Cmt: None   Principal Problem: None                Active Insurance as of 10/22/2021     Primary Coverage     Payor Plan Insurance Group Employer/Plan Group    [facility] MEDICARE REPLACEMENT [facility] MEDICARE REPLACEMENT [id number]     Payor Plan Address Payor Plan Phone Number Payor Plan Fax Number Effective Dates    [address] [address]   1/1/2016 - None Entered    [facility] [id number]       Subscriber Name Subscriber Birth Date Member ID       JAYLYN GUZMÁN 1950 [id number]                 Emergency Contacts      (Rel.) Home Phone Work Phone Mobile Phone    Yen(MYAH)Sarah (Partner) [phone] -- --    Tiffanie Gonzalez (Sister) [phone] -- --              "

## 2021-10-24 NOTE — NURSING NOTE
This RN called pt's primary RN -Michela FLORES and reported that pt had an extravasation in Left AC iv site area during her MRI.  Informed RN -Michela that there is instructions and orders from the Radiologist -Dr Tovar on the care of the extravasation in pt's chart.       Protective goggles and mask in place with all patient interactions today

## 2021-10-24 NOTE — NURSING NOTE
"Access Center follow-up.  RN reports patient has been more confused but doing \"fine\".  Upon entering room, she is sleeping soundly, did not waken to voice.  CIWA score have been low at 1; VSS.      AC following.  "

## 2021-10-24 NOTE — CASE MANAGEMENT/SOCIAL WORK
Discharge Planning Assessment  [facility]     Patient Name: Jaylyn Fuentes  MRN: [id number]  Today's Date: 10/24/2021    Admit Date: 10/22/2021     Discharge Needs Assessment     Row Name 10/24/21 140       Living Environment    Lives With significant other; sibling(s)    Name(s) of Who Lives With Patient MYAH Brown- [phone]    Current Living Arrangements home/apartment/condo    Potentially Unsafe Housing Conditions unable to assess    Primary Care Provided by self    Provides Primary Care For no one    Family Caregiver if Needed significant other    Quality of Family Relationships unable to assess    Able to Return to Prior Arrangements other (see comments)    Living Arrangement Comments unsure if pt can return to her home. Depends on if her mental status clears.       Resource/Environmental Concerns    Resource/Environmental Concerns none    Transportation Concerns other (see comments)       Transition Planning    Patient/Family Anticipates Transition to other (see comments)    Patient/Family Anticipated Services at Transition other (see comments)    Transportation Anticipated other (see comments)       Discharge Needs Assessment    Readmission Within the Last 30 Days no previous admission in last 30 days    Equipment Currently Used at Home walker, standard; shower chair    Concerns to be Addressed care coordination/care conferences    Concerns Comments POA unsure of needs at this time pending pt status at discharge.    Anticipated Changes Related to Illness other (see comments)    Equipment Needed After Discharge other (see comments)    Discharge Facility/Level of Care Needs other (see comments)    Current Discharge Risk cognitively impaired               Discharge Plan     Row Name 10/24/21 8763       Plan    Plan DC plan is Home    Patient/Family in Agreement with Plan yes    Plan Comments CCP called pts Sarah GLASGOW-[phone], as pt. is confused.  CCP reviewed pts information and  introduced self.  Pt lives with Ms. Barlow in a one level home.  There are 2 steps to enter the home.  Prior to this hospital stay pt. was independent with ADL’s/ IADL’s even driving herself to and from her appointments.  Pt uses a walker at times.  She has a shower chair in her walk in shower. Pt uses bilateral hearing aids.  Pt has had Home Health before but does not remember which agency.  Pt has also been been to rehabilitation after orthopedic surgery years ago.  She cannot remember the name.  Pt uses [facility] on [address] and has no issues with her prescriptions.  She does not want to use meds to beds.  Pt has POA/Living will both on file at her PCP office.   At this point the dc plan is unclear due to pts mental status.  CCP to follow.  Mayra Cano RN              Continued Care and Services - Admitted Since 10/22/2021    Coordination has not been started for this encounter.          Demographic Summary     Row Name 10/24/21 5328       General Information    Admission Type observation    Arrived From home    Referral Source case finding    Reason for Consult care coordination/care conference    Preferred Language English     Used During This Interaction no       Contact Information    Permission Granted to Share Info With     Contact Information Obtained for            Name,  Janine Bernal RN    Phone,  [phone]               Functional Status     Row Name 10/24/21 3737       Functional Status    Usual Activity Tolerance good    Current Activity Tolerance good    Functional Status Comments prior to hospital stay pt was independent with her ADL's/IADL's       Functional Status, IADL    Medications independent    Meal Preparation independent    Housekeeping independent    Laundry independent    Shopping independent    IADL Comments pt was able to drive prior to this hospital stay.       Mental Status    General  Appearance WDL WDL       Mental Status Summary    Recent Changes in Mental Status/Cognitive Functioning unable to assess; hearing    Mental Status Comments pt confused, information taken from Sarah LGASGOW- [phone]       Employment/    Current or Previous Occupation unable to assess               Psychosocial    No documentation.                Abuse/Neglect    No documentation.                Legal    No documentation.                Substance Abuse    No documentation.                Patient Forms    No documentation.                   Janine Bernal

## 2021-10-24 NOTE — PLAN OF CARE
Goal Outcome Evaluation:  Plan of Care Reviewed With: patient           Outcome Summary: AOx4. BP elevated, PRN clonodine given. CIWA negative. Pt restless and unable to sleep, PRN melatonin given. Around 0500, pt had an episode of confusion and took her IV out. Pt was quickly reoriented. Pt states she felt like she was having very vivid dreams and got confused. Neuro consulted for today as well as PT. Purewick in place, good urine output voiding spontaneously. Awaiting am labs.

## 2021-10-24 NOTE — PROGRESS NOTES
Name: Jaylyn Fuentes ADMIT: 10/22/2021   : 1950  PCP: Annie Romero MD    MRN: [id number] LOS: 0 days   AGE/SEX: 71 y.o. female  ROOM: [facility]     Subjective   Subjective     Patient is lying on the bed and reportedly she had become confused ill in the early morning and became somewhat agitated.  Her gait is also not steady.  No reports of nausea, vomiting abdominal pain, chest pain.       Objective   Objective   Vital Signs  Temp:  [97.8 °F (36.6 °C)-98 °F (36.7 °C)] 98 °F (36.7 °C)  Heart Rate:  [60-69] 69  Resp:  [12-18] 18  BP: (140-177)/() 156/108  SpO2:  [95 %-96 %] 95 %  on   ;   Device (Oxygen Therapy): room air  Body mass index is 37.66 kg/m².  Physical Exam  Constitutional:       Appearance: Normal appearance. She is obese.   HENT:      Head: Normocephalic and atraumatic.      Nose: Nose normal.      Mouth/Throat:      Mouth: Mucous membranes are moist.   Eyes:      Extraocular Movements: Extraocular movements intact.      Conjunctiva/sclera: Conjunctivae normal.      Pupils: Pupils are equal, round, and reactive to light.   Cardiovascular:      Rate and Rhythm: Normal rate and regular rhythm.      Pulses: Normal pulses.      Heart sounds: Normal heart sounds. No murmur heard.      Pulmonary:      Effort: Pulmonary effort is normal. No respiratory distress.      Breath sounds: Normal breath sounds.   Abdominal:      General: Bowel sounds are normal. There is no distension.      Palpations: Abdomen is soft.      Tenderness: There is no abdominal tenderness.   Musculoskeletal:      Cervical back: Normal range of motion and neck supple.      Right lower leg: No edema.      Left lower leg: No edema.   Skin:     General: Skin is warm and dry.      Coloration: Skin is not jaundiced.   Neurological:      General: No focal deficit present.      Mental Status: She is alert and oriented to person, place, and time. Mental status is at baseline.   Psychiatric:         Mood and Affect: Mood  normal.         Thought Content: Thought content normal.         Judgment: Judgment normal.         Results Review     I reviewed the patient's new clinical results.  Results from last 7 days   Lab Units 10/24/21  0524 10/23/21  0554 10/22/21  1646   WBC 10*3/mm3 5.37 4.61 5.93   HEMOGLOBIN g/dL 11.7* 11.1* 11.1*   PLATELETS 10*3/mm3 181 243 249     Results from last 7 days   Lab Units 10/24/21  0524 10/23/21  0820 10/22/21  1646   SODIUM mmol/L 138 142 142   POTASSIUM mmol/L 3.6 4.1 4.2   CHLORIDE mmol/L 100 106 106   CO2 mmol/L 27.8 25.4 29.5*   BUN mg/dL 40* 10 10   CREATININE mg/dL 1.10* 0.99 1.07*   GLUCOSE mg/dL 101* 108* 124*   Estimated Creatinine Clearance: 53 mL/min (A) (by C-G formula based on SCr of 1.1 mg/dL (H)).  Results from last 7 days   Lab Units 10/22/21  1646   ALBUMIN g/dL 3.40*   BILIRUBIN mg/dL 0.3   ALK PHOS U/L 70   AST (SGOT) U/L 19   ALT (SGPT) U/L 12     Results from last 7 days   Lab Units 10/24/21  0524 10/23/21  0820 10/22/21  1646   CALCIUM mg/dL 8.6 8.6 8.5*   ALBUMIN g/dL  --   --  3.40*       COVID19   Date Value Ref Range Status   10/22/2021 Not Detected Not Detected - Ref. Range Final   10/12/2021 Not Detected Not Detected - Ref. Range Final     No results found for: HGBA1C, POCGLU    MRI Brain With & Without Contrast  Narrative: MRI BRAIN WITH/WITHOUT CONTRAST 10/24/2021     HISTORY: Memory loss.     Multiple precontrast and postcontrast sagittal, axial and coronal images  were obtained through the brain.     The diffusion-weighted images show no evidence of acute infarction.     FLAIR images show scattered foci of bright signal intensity in the  bilateral cerebral white matter consistent with mild-to-moderate small  vessel white matter ischemic disease.     No intracranial hemorrhage is seen.     Following gadolinium the postcontrast images show no significant  vascular enhancement. It appears that the contrast was not properly  injected into the vein due to failed IV access.  Patient was clinically  doing well with regards to any extravasation.     Craniocervical junction and sella appear normal.     Impression: 1.  Mild-to-moderate small vessel white matter ischemic disease.  2.  No evidence of acute infarction or hemorrhage.  3.  Limited postcontrast images due to a failed IV access. If further  evaluation is clinically indicated repeat postcontrast images could be  obtained with improved IV access.          Scheduled Medications  calcium-vitamin D, 500 mg, Oral, BID  cetirizine, 10 mg, Oral, Daily  enoxaparin, 40 mg, Subcutaneous, Nightly  thiamine, 100 mg, Oral, Daily   And  multivitamin, 1 tablet, Oral, Daily   And  folic acid, 1 mg, Oral, Daily  lisinopril, 20 mg, Oral, Daily  OLANZapine, 2.5 mg, Oral, Daily With Breakfast & Dinner  oxybutynin XL, 5 mg, Oral, Daily  pantoprazole, 40 mg, Oral, QAM  pravastatin, 20 mg, Oral, Daily  pregabalin, 150 mg, Oral, Q12H  thiamine (VITAMIN B1) IVPB, 200 mg, Intravenous, Daily  vilazodone, 40 mg, Oral, Daily    Infusions  sodium chloride, 75 mL/hr, Last Rate: 75 mL/hr (10/24/21 1134)    Diet  Diet Regular; Cardiac       Assessment/Plan     Active Hospital Problems    Diagnosis  POA   • Frequent falls [R29.6]  Not Applicable      Resolved Hospital Problems   No resolved problems to display.       71 y.o. female admitted with <principal problem not specified>.    1. Episodes of altered mental status with balance issues, reportedly she has been having these episodes at home and significant other is quite concerned.    Neurology consult has been obtained and the CT of the brain and cervical spine with no acute findings.  2.  History of ethanol abuse, patient states currently she does not drink alcohol however will monitor for withdrawals.  3.  Hypertension, on lisinopril and will be continued.  4.  Neuropathy, on Lyrica.  5.  On Lovenox for DVT prophylaxis.  6.  CODE STATUS is full code.      Yogi Morales MD  [facility]  10/24/21  17:27 EDT

## 2021-10-24 NOTE — THERAPY EVALUATION
Patient Name: Jaylyn Fuentes  : 1950    MRN: [id number]                              Today's Date: 10/24/2021       Admit Date: 10/22/2021    Visit Dx:     ICD-10-CM ICD-9-CM   1. Frequent falls  R29.6 V15.88   2. Altered mental status, unspecified altered mental status type  R41.82 780.97   3. Pulmonary nodule seen on imaging study  R91.1 793.11     Patient Active Problem List   Diagnosis   • Osteoarthrosis, localized, primary, shoulder region   • Hypertension   • Depressed   • Status post total replacement of left shoulder   • Duodenitis   • Abnormal CT scan, small bowel   • Nausea and vomiting   • Observed sleep apnea   • Snoring   • Hypersomnia   • Non-restorative sleep   • Class 2 obesity   • Poor memory   • Frequent falls     Past Medical History:   Diagnosis Date   • Abdominal pain    • Abnormal CT of the abdomen    • Abnormal glucose    • Abnormal liver enzymes    • Abnormal urinalysis    • Achilles tendinitis     left   • Acid reflux disease    • Acromioclavicular joint arthritis    • Acromioclavicular joint pain, bilateral    • Aftercare following knee joint replacement surgery    • Alcohol abuse    • Alternating constipation and diarrhea    • Anemia    • Arthralgia of multiple sites    • Arthritis    • BMI 34.0-34.9,adult    • Carpal tunnel syndrome    • Cataract    • Cellulitis of extremity    • Chronic insomnia    • Chronic kidney disease, stage III (moderate) (HCC)    • Contact dermatitis    • Contusion of bone    • Cough    • Decreased range of motion    • Degeneration of cervical intervertebral disc    • Depressed    • Dermatitis    • Diarrhea    • Dry eye    • Dysphagia    • Edema of extremities    • Esophagitis    • Fatigue    • Flu vaccine need    • GERD (gastroesophageal reflux disease)    • Glenohumeral arthritis, left    • Hemorrhoids    • Hiatal hernia    • Hip pain    • History of colonic polyps    • History of DVT in adulthood    • History of transfusion    • Hyperactivity of  bladder    • Hyperlipidemia    • Hypertension    • Hypokalemia    • Irritable bowel syndrome with diarrhea    • Lactose intolerance    • Left ankle pain    • Leg cramps    • Leg swelling    • Limitation of joint motion of ankle, left    • Lower extremity tendinopathy    • Medicare annual wellness visit, subsequent    • Methicillin resistant Staphylococcus aureus infection    • Microscopic hematuria    • Migraine    • Obstructive sleep apnea    • Osteoarthritis, chronic    • Pain, foot, left, chronic    • Pancreatitis, chronic (HCC)    • Pes planus    • PONV (postoperative nausea and vomiting)     SCOPOLAMINE HELPS-USED DURING LAST SURGERY    • Rectal leakage    • Retrocalcaneal bursitis    • Right ankle instability    • Right ankle pain    • Right ankle sprain    • Right ankle swelling    • Rotator cuff tear, left    • S/P shoulder surgery    • Shoulder region pain    • Spinal stenosis    • Swallowing difficulty    • Urge incontinence of urine    • Urinary incontinence    • Visit for screening mammogram    • Wears contact lenses    • Wears dentures     UPPER AND LOWER    • Wears hearing aid     DOESN'T HAVE WITH HER   • Weight gain      Past Surgical History:   Procedure Laterality Date   • BACK SURGERY      LUMBAR FUSION    • CERVICAL FUSION      plates, rods, and screws   • CHOLECYSTECTOMY     • COLONOSCOPY      2 YEARS AGO    • ENDOSCOPY N/A 10/14/2021    Procedure: ESOPHAGOGASTRODUODENOSCOPY with biopsy and polypectomy;  Surgeon: Hussain Basilio MD;  [place] [facility] [place];  Service: Gastroenterology;  Laterality: N/A;  gastritis and polyps   • EYE SURGERY      LASER FOR KERITONOSIS    • GALLBLADDER SURGERY     • HERNIA REPAIR     • HYSTERECTOMY     • INGUINAL HERNIA REPAIR     • JOINT REPLACEMENT      BILATERAL KNEES, BILATERAL HIPS   • NECK SURGERY     • [place] RECONSTR TOTAL SHOULDER IMPLANT Left 2/13/2017    Procedure: TOTAL SHOULDER REPLACEMENT LEFT;  Surgeon: Benigno Chavez MD;  Location: [facility]  OR;  Service: Orthopedics   • REPLACEMENT TOTAL KNEE     • SHOULDER LIGAMENT REPAIR Right    • TONSILLECTOMY     • TOTAL HIP ARTHROPLASTY     • UPPER GASTROINTESTINAL ENDOSCOPY        General Information     Row Name 10/24/21 1418          Physical Therapy Time and Intention    Document Type evaluation  -DJ     Mode of Treatment individual therapy; physical therapy  -DJ     Row Name 10/24/21 1418          General Information    Patient Profile Reviewed yes  -DJ     Prior Level of Function independent:; all household mobility; ADL's  -DJ     Existing Precautions/Restrictions fall  -DJ     Barriers to Rehab medically complex; cognitive status  -DJ     Row Name 10/24/21 1418          Living Environment    Lives With significant other  -DJ     Row Name 10/24/21 1418          Home Main Entrance    Number of Stairs, Main Entrance one; two  from [place]  -DJ     Row Name 10/24/21 1418          Stairs Within Home, Primary    Number of Stairs, Within Home, Primary none  -DJ     Row Name 10/24/21 1418          Cognition    Orientation Status (Cognition) oriented to; person; situation  pleasantly confused  -DJ     Row Name 10/24/21 1418          Safety Issues, Functional Mobility    Safety Issues Affecting Function (Mobility) awareness of need for assistance; judgment; safety precaution awareness  -DJ     Impairments Affecting Function (Mobility) endurance/activity tolerance  -DJ     Comment, Safety Issues/Impairments (Mobility) gtbelt, nonskid socks  -DJ           User Key  (r) = Recorded By, (t) = Taken By, (c) = Cosigned By    Initials Name Provider Type    Roopa Christei, PT Physical Therapist               Mobility     Row Name 10/24/21 1420          Bed Mobility    Bed Mobility supine-sit; sit-supine  -DJ     Supine-Sit [place] (Bed Mobility) contact guard; verbal cues; minimum assist (75% patient effort)  -DJ     Sit-Supine [place] (Bed Mobility) contact guard; verbal cues  -DJ     Assistive Device (Bed  Mobility) bed rails; head of bed elevated  -DJ     Comment (Bed Mobility) pt abl eot reposition self in bed  -DJ     Row Name 10/24/21 1420          Transfers    Comment (Transfers) sit/stand from EOB x 2  -DJ     Row Name 10/24/21 1420          Bed-Chair Transfer    Bed-Chair [place] (Transfers) not tested  -DJ     Row Name 10/24/21 1420          Sit-Stand Transfer    Sit-Stand [place] (Transfers) contact guard; verbal cues  -DJ     Assistive Device (Sit-Stand Transfers) walker, front-wheeled  -DJ     Row Name 10/24/21 1420          Gait/Stairs (Locomotion)    [place]oyd Level (Gait) contact guard; verbal cues  -DJ     Assistive Device (Gait) walker, front-wheeled  -DJ     Distance in Feet (Gait) 140'  -DJ     Deviations/Abnormal Patterns (Gait) stride length decreased  -DJ     Bilateral Gait Deviations forward flexed posture  -DJ     [place]oyd Level (Stairs) not tested  -DJ     Comment (Gait/Stairs) Pt amb 140' with r wx on level surfaces with CGA/vc for posture. Pace is good, balance is fair with r wx, endurance limits further amb distance. Pt amb 10' without AD back to bed holding onto bed.  -DJ           User Key  (r) = Recorded By, (t) = Taken By, (c) = Cosigned By    Initials Name Provider Type    Roopa Christie, PT Physical Therapist               Obj/Interventions     Row Name 10/24/21 1423          Range of Motion Comprehensive    General Range of Motion no range of motion deficits identified  -DJ     Row Name 10/24/21 1423          Strength Comprehensive (MMT)    Comment, General Manual Muscle Testing (MMT) Assessment Moves all 4s, grossly 4/5  -DJ     Row Name 10/24/21 1423          Motor Skills    Motor Skills functional endurance  -DJ     Functional Endurance fatigues with increased amb  -DJ     Row Name 10/24/21 1423          Balance    Balance Assessment sitting static balance; standing static balance; standing dynamic balance  -DJ     Static Sitting Balance WFL; unsupported; sitting,  edge of bed  -DJ     Static Standing Balance WFL; supported; standing  -DJ     Dynamic Standing Balance WFL; supported; standing  -DJ     Balance Interventions sitting; standing; sit to stand; supported; weight shifting activity  -DJ     Row Name 10/24/21 1423          Sensory Assessment (Somatosensory)    Sensory Assessment (Somatosensory) not tested  -DJ           User Key  (r) = Recorded By, (t) = Taken By, (c) = Cosigned By    Initials Name Provider Type    DJ Roopa De Jesus, PT Physical Therapist               Goals/Plan     Row Name 10/24/21 1430          Bed Mobility Goal 1 (PT)    Activity/Assistive Device (Bed Mobility Goal 1, PT) sit to supine; supine to sit  -DJ     [place] Level/Cues Needed (Bed Mobility Goal 1, PT) modified independence; verbal cues required  -DJ     Time Frame (Bed Mobility Goal 1, PT) 10 days  -DJ     Row Name 10/24/21 1430          Transfer Goal 1 (PT)    Activity/Assistive Device (Transfer Goal 1, PT) sit-to-stand/stand-to-sit  -DJ     [place] Level/Cues Needed (Transfer Goal 1, PT) verbal cues required; standby assist  -DJ     Time Frame (Transfer Goal 1, PT) 10 days  -DJ     Row Name 10/24/21 1430          Gait Training Goal 1 (PT)    Activity/Assistive Device (Gait Training Goal 1, PT) gait (walking locomotion); assistive device use; improve balance and speed; increase endurance/gait distance; increase energy conservation; walker, rolling  -DJ     [place] Level (Gait Training Goal 1, PT) standby assist  -DJ     Distance (Gait Training Goal 1, PT) >300'  -DJ     Time Frame (Gait Training Goal 1, PT) 10 days  -DJ     Row Name 10/24/21 1430          Patient Education Goal (PT)    Activity (Patient Education Goal, PT) HEP  -DJ     [place]/Cues/Accuracy (Memory Goal 2, PT) demonstrates adequately; verbalizes understanding  -DJ     Time Frame (Patient Education Goal, PT) 3 days; short term goal (STG)  -DJ           User Key  (r) = Recorded By, (t) = Taken By, (c) =  Cosigned By    Initials Name Provider Type    Roopa Christie, PT Physical Therapist               Clinical Impression     Row Name 10/24/21 1427          Pain    Additional Documentation Pain Scale: Numbers Pre/Post-Treatment (Group)  -DJ     Row Name 10/24/21 1427          Pain Scale: Numbers Pre/Post-Treatment    Pretreatment Pain Rating 0/10 - no pain  -DJ     Row Name 10/24/21 1420          Plan of Care Review    Plan of Care Reviewed With patient  -DJ     Outcome Summary 72yo obese white female admitted 10/22/21 due to altered mental status and falling at home. PMH includes sh DJD and total sh replacement, hbp, depression, sleep apnea. PLOF: Pt lives at home with sig other and was independent iw mobility and ADLs. She is primarily limited at this time by generalized weakness and decreased activity tolerance that limit her functional activities. Today, she was eager to participate in PT. She is pleasantly confused at times. She req CGA for bed mobility and sit/stand from EOB. Pt amb 140' with r wx (10' without AD) with CGA and vc for posture. Pt advised to use wx at all times at home glo at night. Pt fatigues with amb. She would benefit from skilled PT services for ther ex, gt/transfer training, bed mobility, balance, and endurance as tolerated to prepare for d/c home. Mental status may limit rehab potential. Recommend home health services upon d/c  -DJ     Row Name 10/24/21 1426          Therapy Assessment/Plan (PT)    Patient/Family Therapy Goals Statement (PT) home  -DJ     Rehab Potential (PT) good, to achieve stated therapy goals  -DJ     Criteria for Skilled Interventions Met (PT) yes; meets criteria; skilled treatment is necessary  -     Row Name 10/24/21 1420          Vital Signs    O2 Delivery Pre Treatment room air  -DJ     O2 Delivery Intra Treatment room air  -DJ     O2 Delivery Post Treatment room air  -DJ     Pre Patient Position Supine  -DJ     Intra Patient Position Standing  -DJ     Post  Patient Position Supine  -DJ     Row Name 10/24/21 1424          Positioning and Restraints    Pre-Treatment Position in bed  -DJ     Post Treatment Position bed  -DJ     In Bed notified nsg; supine; call light within reach; encouraged to call for assist; exit alarm on  -DJ           User Key  (r) = Recorded By, (t) = Taken By, (c) = Cosigned By    Initials Name Provider Type    Roopa Christie, ANICETO Physical Therapist               Outcome Measures     Row Name 10/24/21 1431          How much help from another person do you currently need...    Turning from your back to your side while in flat bed without using bedrails? 3  -DJ     Moving from lying on back to sitting on the side of a flat bed without bedrails? 3  -DJ     Moving to and from a bed to a chair (including a wheelchair)? 3  -DJ     Standing up from a chair using your arms (e.g., wheelchair, bedside chair)? 3  -DJ     Climbing 3-5 steps with a railing? 2  -DJ     To walk in hospital room? 3  -DJ     AM-PAC 6 Clicks Score (PT) 17  -DJ     Row Name 10/24/21 1431          Functional Assessment    Outcome Measure Options AM-PAC 6 Clicks Basic Mobility (PT)  -DJ           User Key  (r) = Recorded By, (t) = Taken By, (c) = Cosigned By    Initials Name Provider Type    Roopa Christie, ANICETO Physical Therapist                             Physical Therapy Education                 Title: PT OT SLP Therapies (In Progress)     Topic: Physical Therapy (In Progress)     Point: Mobility training (Done)     Learning Progress Summary           Patient Acceptance, E, VU,NR by JF at 10/24/2021 1432                   Point: Home exercise program (Not Started)     Learner Progress:  Not documented in this visit.          Point: Body mechanics (Done)     Learning Progress Summary           Patient Acceptance, E, VU,NR by JF at 10/24/2021 1432                   Point: Precautions (Done)     Learning Progress Summary           Patient Acceptance, E, VU,NR by JF at 10/24/2021 1432                                User Key     Initials Effective Dates Name Provider Type Discipline    DJ 10/25/19 -  Roopa De Jesus PT Physical Therapist PT              PT Recommendation and Plan  Planned Therapy Interventions (PT): balance training, bed mobility training, gait training, home exercise program, patient/family education, postural re-education, strengthening, stair training, transfer training  Plan of Care Reviewed With: patient  Outcome Summary: 70yo obese white female admitted 10/22/21 due to altered mental status and falling at home. PMH includes sh DJD and total sh replacement, hbp, depression, sleep apnea. PLOF: Pt lives at home with sig other and was independent iw mobility and ADLs. She is primarily limited at this time by generalized weakness and decreased activity tolerance that limit her functional activities. Today, she was eager to participate in PT. She is pleasantly confused at times. She req CGA for bed mobility and sit/stand from EOB. Pt amb 140' with r wx (10' without AD) with CGA and vc for posture. Pt advised to use wx at all times at home glo at night. Pt fatigues with amb. She would benefit from skilled PT services for ther ex, gt/transfer training, bed mobility, balance, and endurance as tolerated to prepare for d/c home. Mental status may limit rehab potential. Recommend home health services upon d/c     Time Calculation:    PT Charges     Row Name 10/24/21 1434             Time Calculation    Start Time 1347  -DJ      Stop Time 1410  -DJ      Time Calculation (min) 23 min  -DJ      PT Non-Billable Time (min) 10 min  -DJ      PT Received On 10/24/21  -DJ      PT - Next Appointment 10/25/21  -DJ      PT Goal Re-Cert Due Date 11/03/21  -DJ            User Key  (r) = Recorded By, (t) = Taken By, (c) = Cosigned By    Initials Name Provider Type    Roopa Christie, PT Physical Therapist              Therapy Charges for Today     Code Description Service Date Service Provider  Modifiers Qty    13932412177 HC PT EVAL MOD COMPLEXITY 2 10/24/2021 Roopa De Jesus, PT GP 1    96023831478 HC PT THER PROC EA 15 MIN 10/24/2021 Roopa De Jesus, PT GP 1    79862832121 HC PT THER SUPP EA 15 MIN 10/24/2021 Roopa De Jesus, PT GP 1          PT G-Codes  Outcome Measure Options: AM-PAC 6 Clicks Basic Mobility (PT)  AM-PAC 6 Clicks Score (PT): 17    Roopa De Jesus, PT  10/24/2021

## 2021-10-25 LAB
ANION GAP SERPL CALCULATED.3IONS-SCNC: 12.1 MMOL/L (ref 5–15)
BASOPHILS # BLD AUTO: 0.03 10*3/MM3 (ref 0–0.2)
BASOPHILS NFR BLD AUTO: 0.6 % (ref 0–1.5)
BUN SERPL-MCNC: 5 MG/DL (ref 8–23)
BUN/CREAT SERPL: 5.2 (ref 7–25)
CALCIUM SPEC-SCNC: 8.8 MG/DL (ref 8.6–10.5)
CHLORIDE SERPL-SCNC: 106 MMOL/L (ref 98–107)
CO2 SERPL-SCNC: 24.9 MMOL/L (ref 22–29)
CREAT SERPL-MCNC: 0.96 MG/DL (ref 0.57–1)
DEPRECATED RDW RBC AUTO: 48.7 FL (ref 37–54)
EOSINOPHIL # BLD AUTO: 0.15 10*3/MM3 (ref 0–0.4)
EOSINOPHIL NFR BLD AUTO: 2.8 % (ref 0.3–6.2)
ERYTHROCYTE [DISTWIDTH] IN BLOOD BY AUTOMATED COUNT: 14.2 % (ref 12.3–15.4)
GFR SERPL CREATININE-BSD FRML MDRD: 57 ML/MIN/1.73
GLUCOSE SERPL-MCNC: 86 MG/DL (ref 65–99)
HCT VFR BLD AUTO: 38.3 % (ref 34–46.6)
HGB BLD-MCNC: 12.2 G/DL (ref 12–15.9)
IMM GRANULOCYTES # BLD AUTO: 0.01 10*3/MM3 (ref 0–0.05)
IMM GRANULOCYTES NFR BLD AUTO: 0.2 % (ref 0–0.5)
LYMPHOCYTES # BLD AUTO: 2.08 10*3/MM3 (ref 0.7–3.1)
LYMPHOCYTES NFR BLD AUTO: 38.3 % (ref 19.6–45.3)
MAGNESIUM SERPL-MCNC: 1.9 MG/DL (ref 1.6–2.4)
MCH RBC QN AUTO: 29.6 PG (ref 26.6–33)
MCHC RBC AUTO-ENTMCNC: 31.9 G/DL (ref 31.5–35.7)
MCV RBC AUTO: 93 FL (ref 79–97)
MONOCYTES # BLD AUTO: 0.52 10*3/MM3 (ref 0.1–0.9)
MONOCYTES NFR BLD AUTO: 9.6 % (ref 5–12)
NEUTROPHILS NFR BLD AUTO: 2.64 10*3/MM3 (ref 1.7–7)
NEUTROPHILS NFR BLD AUTO: 48.5 % (ref 42.7–76)
NRBC BLD AUTO-RTO: 0 /100 WBC (ref 0–0.2)
PLATELET # BLD AUTO: 267 10*3/MM3 (ref 140–450)
PMV BLD AUTO: 9.4 FL (ref 6–12)
POTASSIUM SERPL-SCNC: 3.3 MMOL/L (ref 3.5–5.2)
POTASSIUM SERPL-SCNC: 4.3 MMOL/L (ref 3.5–5.2)
RBC # BLD AUTO: 4.12 10*6/MM3 (ref 3.77–5.28)
SODIUM SERPL-SCNC: 143 MMOL/L (ref 136–145)
WBC # BLD AUTO: 5.43 10*3/MM3 (ref 3.4–10.8)

## 2021-10-25 PROCEDURE — 84132 ASSAY OF SERUM POTASSIUM: CPT | Performed by: INTERNAL MEDICINE

## 2021-10-25 PROCEDURE — 85025 COMPLETE CBC W/AUTO DIFF WBC: CPT | Performed by: INTERNAL MEDICINE

## 2021-10-25 PROCEDURE — 99214 OFFICE O/P EST MOD 30 MIN: CPT | Performed by: NURSE PRACTITIONER

## 2021-10-25 PROCEDURE — 25010000002 DIPHENHYDRAMINE PER 50 MG: Performed by: NURSE PRACTITIONER

## 2021-10-25 PROCEDURE — 25010000002 LEVETIRACETAM IN NACL 0.82% 500 MG/100ML SOLUTION: Performed by: NURSE PRACTITIONER

## 2021-10-25 PROCEDURE — 96361 HYDRATE IV INFUSION ADD-ON: CPT

## 2021-10-25 PROCEDURE — 25010000002 PROCHLORPERAZINE 10 MG/2ML SOLUTION: Performed by: NURSE PRACTITIONER

## 2021-10-25 PROCEDURE — 25010000002 ENOXAPARIN PER 10 MG: Performed by: INTERNAL MEDICINE

## 2021-10-25 PROCEDURE — 96372 THER/PROPH/DIAG INJ SC/IM: CPT

## 2021-10-25 PROCEDURE — 25010000002 MAGNESIUM SULFATE IN D5W 1G/100ML (PREMIX) 1-5 GM/100ML-% SOLUTION: Performed by: NURSE PRACTITIONER

## 2021-10-25 PROCEDURE — 80048 BASIC METABOLIC PNL TOTAL CA: CPT | Performed by: INTERNAL MEDICINE

## 2021-10-25 PROCEDURE — G0378 HOSPITAL OBSERVATION PER HR: HCPCS

## 2021-10-25 PROCEDURE — 83735 ASSAY OF MAGNESIUM: CPT | Performed by: INTERNAL MEDICINE

## 2021-10-25 PROCEDURE — 25010000002 THIAMINE PER 100 MG: Performed by: PSYCHIATRY & NEUROLOGY

## 2021-10-25 PROCEDURE — 96375 TX/PRO/DX INJ NEW DRUG ADDON: CPT

## 2021-10-25 RX ORDER — MAGNESIUM SULFATE HEPTAHYDRATE 40 MG/ML
2 INJECTION, SOLUTION INTRAVENOUS AS NEEDED
Status: DISCONTINUED | OUTPATIENT
Start: 2021-10-25 | End: 2021-10-26 | Stop reason: HOSPADM

## 2021-10-25 RX ORDER — PROCHLORPERAZINE EDISYLATE 5 MG/ML
10 INJECTION INTRAMUSCULAR; INTRAVENOUS EVERY 8 HOURS PRN
Status: DISCONTINUED | OUTPATIENT
Start: 2021-10-25 | End: 2021-10-26 | Stop reason: HOSPADM

## 2021-10-25 RX ORDER — POTASSIUM CHLORIDE 750 MG/1
40 TABLET, FILM COATED, EXTENDED RELEASE ORAL AS NEEDED
Status: DISCONTINUED | OUTPATIENT
Start: 2021-10-25 | End: 2021-10-26 | Stop reason: HOSPADM

## 2021-10-25 RX ORDER — PROCHLORPERAZINE EDISYLATE 5 MG/ML
10 INJECTION INTRAMUSCULAR; INTRAVENOUS EVERY 8 HOURS PRN
Status: DISCONTINUED | OUTPATIENT
Start: 2021-10-25 | End: 2021-10-25

## 2021-10-25 RX ORDER — POTASSIUM CHLORIDE 1.5 G/1.77G
40 POWDER, FOR SOLUTION ORAL AS NEEDED
Status: DISCONTINUED | OUTPATIENT
Start: 2021-10-25 | End: 2021-10-26 | Stop reason: HOSPADM

## 2021-10-25 RX ORDER — LEVETIRACETAM 5 MG/ML
500 INJECTION INTRAVASCULAR ONCE
Status: COMPLETED | OUTPATIENT
Start: 2021-10-25 | End: 2021-10-25

## 2021-10-25 RX ORDER — BUTALBITAL, ACETAMINOPHEN AND CAFFEINE 50; 325; 40 MG/1; MG/1; MG/1
1 TABLET ORAL EVERY 4 HOURS PRN
Status: DISCONTINUED | OUTPATIENT
Start: 2021-10-25 | End: 2021-10-26 | Stop reason: HOSPADM

## 2021-10-25 RX ORDER — MAGNESIUM SULFATE HEPTAHYDRATE 40 MG/ML
4 INJECTION, SOLUTION INTRAVENOUS AS NEEDED
Status: DISCONTINUED | OUTPATIENT
Start: 2021-10-25 | End: 2021-10-26 | Stop reason: HOSPADM

## 2021-10-25 RX ORDER — DIPHENHYDRAMINE HYDROCHLORIDE 50 MG/ML
25 INJECTION INTRAMUSCULAR; INTRAVENOUS ONCE
Status: COMPLETED | OUTPATIENT
Start: 2021-10-25 | End: 2021-10-25

## 2021-10-25 RX ORDER — MAGNESIUM SULFATE 1 G/100ML
1 INJECTION INTRAVENOUS ONCE
Status: COMPLETED | OUTPATIENT
Start: 2021-10-25 | End: 2021-10-25

## 2021-10-25 RX ORDER — POTASSIUM CHLORIDE 7.45 MG/ML
10 INJECTION INTRAVENOUS
Status: DISCONTINUED | OUTPATIENT
Start: 2021-10-25 | End: 2021-10-26 | Stop reason: HOSPADM

## 2021-10-25 RX ADMIN — PREGABALIN 150 MG: 75 CAPSULE ORAL at 20:02

## 2021-10-25 RX ADMIN — OXYBUTYNIN CHLORIDE 5 MG: 5 TABLET, EXTENDED RELEASE ORAL at 08:53

## 2021-10-25 RX ADMIN — OLANZAPINE 2.5 MG: 2.5 TABLET ORAL at 17:27

## 2021-10-25 RX ADMIN — PRAVASTATIN SODIUM 20 MG: 20 TABLET ORAL at 08:53

## 2021-10-25 RX ADMIN — FOLIC ACID 1 MG: 1 TABLET ORAL at 11:25

## 2021-10-25 RX ADMIN — VILAZODONE HYDROCHLORIDE 40 MG: 40 TABLET ORAL at 08:53

## 2021-10-25 RX ADMIN — OLANZAPINE 2.5 MG: 2.5 TABLET ORAL at 08:53

## 2021-10-25 RX ADMIN — BUTALBITAL, ACETAMINOPHEN, AND CAFFEINE 1 TABLET: 50; 325; 40 TABLET ORAL at 11:04

## 2021-10-25 RX ADMIN — LISINOPRIL 20 MG: 20 TABLET ORAL at 08:53

## 2021-10-25 RX ADMIN — MAGNESIUM SULFATE HEPTAHYDRATE 1 G: 1 INJECTION, SOLUTION INTRAVENOUS at 15:17

## 2021-10-25 RX ADMIN — SODIUM CHLORIDE 75 ML/HR: 9 INJECTION, SOLUTION INTRAVENOUS at 13:06

## 2021-10-25 RX ADMIN — BUTALBITAL, ACETAMINOPHEN, AND CAFFEINE 1 TABLET: 50; 325; 40 TABLET ORAL at 23:13

## 2021-10-25 RX ADMIN — Medication 1 TABLET: at 08:53

## 2021-10-25 RX ADMIN — CALCIUM CARBONATE-VITAMIN D TAB 500 MG-200 UNIT 500 MG: 500-200 TAB at 08:53

## 2021-10-25 RX ADMIN — ENOXAPARIN SODIUM 40 MG: 40 INJECTION SUBCUTANEOUS at 20:02

## 2021-10-25 RX ADMIN — DIPHENHYDRAMINE HYDROCHLORIDE 25 MG: 50 INJECTION, SOLUTION INTRAMUSCULAR; INTRAVENOUS at 15:16

## 2021-10-25 RX ADMIN — CETIRIZINE HYDROCHLORIDE 10 MG: 10 TABLET ORAL at 08:53

## 2021-10-25 RX ADMIN — CALCIUM CARBONATE-VITAMIN D TAB 500 MG-200 UNIT 500 MG: 500-200 TAB at 20:02

## 2021-10-25 RX ADMIN — LEVETIRACETAM 500 MG: 5 INJECTION INTRAVASCULAR at 17:27

## 2021-10-25 RX ADMIN — POTASSIUM CHLORIDE 40 MEQ: 750 TABLET, EXTENDED RELEASE ORAL at 12:40

## 2021-10-25 RX ADMIN — POTASSIUM CHLORIDE 40 MEQ: 750 TABLET, EXTENDED RELEASE ORAL at 17:26

## 2021-10-25 RX ADMIN — PROCHLORPERAZINE EDISYLATE 10 MG: 5 INJECTION INTRAMUSCULAR; INTRAVENOUS at 13:13

## 2021-10-25 RX ADMIN — PANTOPRAZOLE SODIUM 40 MG: 40 TABLET, DELAYED RELEASE ORAL at 06:40

## 2021-10-25 RX ADMIN — Medication 100 MG: at 08:53

## 2021-10-25 RX ADMIN — CLONIDINE HYDROCHLORIDE 0.1 MG: 0.1 TABLET ORAL at 13:17

## 2021-10-25 RX ADMIN — THIAMINE HYDROCHLORIDE 200 MG: 100 INJECTION, SOLUTION INTRAMUSCULAR; INTRAVENOUS at 13:04

## 2021-10-25 RX ADMIN — PREGABALIN 150 MG: 75 CAPSULE ORAL at 08:53

## 2021-10-25 NOTE — PROGRESS NOTES
Name: Jaylyn Fuentes ADMIT: 10/22/2021   : 1950  PCP: Annie Romero MD    MRN: [id number] LOS: 1 days   AGE/SEX: 71 y.o. female  ROOM: [facility]     Subjective   Subjective       Patient is lying on the bed and denies nausea, vomiting abdominal pain, chest pain.  No new events overnight.       Objective   Objective   Vital Signs  Temp:  [97.6 °F (36.4 °C)-98.1 °F (36.7 °C)] 97.6 °F (36.4 °C)  Heart Rate:  [] 76  Resp:  [16-18] 18  BP: (108-183)/() 157/96  SpO2:  [94 %-96 %] 94 %  on   ;   Device (Oxygen Therapy): room air  Body mass index is 37.46 kg/m².  Physical Exam  Constitutional:       Appearance: Normal appearance. She is obese.   HENT:      Head: Normocephalic and atraumatic.      Nose: Nose normal.      Mouth/Throat:      Mouth: Mucous membranes are moist.   Eyes:      Extraocular Movements: Extraocular movements intact.      Conjunctiva/sclera: Conjunctivae normal.      Pupils: Pupils are equal, round, and reactive to light.   Cardiovascular:      Rate and Rhythm: Normal rate and regular rhythm.      Pulses: Normal pulses.      Heart sounds: Normal heart sounds. No murmur heard.      Pulmonary:      Effort: Pulmonary effort is normal. No respiratory distress.      Breath sounds: Normal breath sounds.   Abdominal:      General: Bowel sounds are normal. There is no distension.      Palpations: Abdomen is soft.      Tenderness: There is no abdominal tenderness.   Musculoskeletal:      Cervical back: Normal range of motion and neck supple.      Right lower leg: No edema.      Left lower leg: No edema.   Skin:     General: Skin is warm and dry.      Coloration: Skin is not jaundiced.   Neurological:      General: No focal deficit present.      Mental Status: She is alert and oriented to person, place, and time. Mental status is at baseline.   Psychiatric:         Mood and Affect: Mood normal.         Thought Content: Thought content normal.         Judgment: Judgment normal.          Results Review     I reviewed the patient's new clinical results.  Results from last 7 days   Lab Units 10/25/21  0426 10/24/21  0524 10/23/21  0554 10/22/21  1646   WBC 10*3/mm3 5.43 5.37 4.61 5.93   HEMOGLOBIN g/dL 12.2 11.7* 11.1* 11.1*   PLATELETS 10*3/mm3 267 181 243 249     Results from last 7 days   Lab Units 10/25/21  0426 10/24/21  0524 10/23/21  0820 10/22/21  1646   SODIUM mmol/L 143 138 142 142   POTASSIUM mmol/L 3.3* 3.6 4.1 4.2   CHLORIDE mmol/L 106 100 106 106   CO2 mmol/L 24.9 27.8 25.4 29.5*   BUN mg/dL 5* 40* 10 10   CREATININE mg/dL 0.96 1.10* 0.99 1.07*   GLUCOSE mg/dL 86 101* 108* 124*   Estimated Creatinine Clearance: 60.5 mL/min (by C-G formula based on SCr of 0.96 mg/dL).  Results from last 7 days   Lab Units 10/22/21  1646   ALBUMIN g/dL 3.40*   BILIRUBIN mg/dL 0.3   ALK PHOS U/L 70   AST (SGOT) U/L 19   ALT (SGPT) U/L 12     Results from last 7 days   Lab Units 10/25/21  1258 10/25/21  0426 10/24/21  0524 10/23/21  0820 10/22/21  1646   CALCIUM mg/dL  --  8.8 8.6 8.6 8.5*   ALBUMIN g/dL  --   --   --   --  3.40*   MAGNESIUM mg/dL 1.9  --   --   --   --        COVID19   Date Value Ref Range Status   10/22/2021 Not Detected Not Detected - Ref. Range Final   10/12/2021 Not Detected Not Detected - Ref. Range Final     No results found for: HGBA1C, POCGLU    MRI Brain With & Without Contrast  Narrative: MRI BRAIN WITH/WITHOUT CONTRAST 10/24/2021     HISTORY: Memory loss.     Multiple precontrast and postcontrast sagittal, axial and coronal images  were obtained through the brain.     The diffusion-weighted images show no evidence of acute infarction.     FLAIR images show scattered foci of bright signal intensity in the  bilateral cerebral white matter consistent with mild-to-moderate small  vessel white matter ischemic disease.     No intracranial hemorrhage is seen.     Following gadolinium the postcontrast images show no significant  vascular enhancement. It appears that the contrast  was not properly  injected into the vein due to failed IV access. Patient was clinically  doing well with regards to any extravasation.     Craniocervical junction and sella appear normal.     Impression: 1.  Mild-to-moderate small vessel white matter ischemic disease.  2.  No evidence of acute infarction or hemorrhage.  3.  Limited postcontrast images due to a failed IV access. If further  evaluation is clinically indicated repeat postcontrast images could be  obtained with improved IV access.     This report was finalized on 10/24/2021 8:35 PM by Dr. Reynaldo Tovar M.D.       Scheduled Medications  calcium-vitamin D, 500 mg, Oral, BID  cetirizine, 10 mg, Oral, Daily  enoxaparin, 40 mg, Subcutaneous, Nightly  levETIRAcetam, 500 mg, Intravenous, Once  lisinopril, 20 mg, Oral, Daily  OLANZapine, 2.5 mg, Oral, Daily With Breakfast & Dinner  oxybutynin XL, 5 mg, Oral, Daily  pantoprazole, 40 mg, Oral, QAM  pravastatin, 20 mg, Oral, Daily  pregabalin, 150 mg, Oral, Q12H  vilazodone, 40 mg, Oral, Daily    Infusions  sodium chloride, 75 mL/hr, Last Rate: 75 mL/hr (10/25/21 1306)    Diet  Diet Regular; Cardiac       Assessment/Plan     Active Hospital Problems    Diagnosis  POA   • Frequent falls [R29.6]  Not Applicable      Resolved Hospital Problems   No resolved problems to display.       71 y.o. female admitted with <principal problem not specified>.    1. Episodes of altered mental status with  Hallucinations and balance issues, neurology did evaluate and felt this could be related to vascular dementia and recommended low-dose Zyprexa.  CT of the brain with no acute findings and MRI brain report is pending.  Continue with PT/OT evaluation. Hopefully discharge home tomorrow.    2.  History of ethanol abuse, patient states currently she does not drink alcohol however will monitor for withdrawals.  3.  Hypertension, on lisinopril and will be continued.  4.  Neuropathy, on Lyrica.  5.  On Lovenox for DVT  prophylaxis.  6.  CODE STATUS is full code.      Yogi Morales MD  [facility]  10/25/21  16:57 EDT

## 2021-10-25 NOTE — SIGNIFICANT NOTE
10/25/21 1549   OTHER   Discipline physical therapist   Rehab Time/Intention   Session Not Performed other (see comments)  (PT attempted to arouse and wake pt. She opened eyes and mumbled then fell back asleep. PT attempted multiple other times and pt would only open eyes then close them again. RN entered room and aware. PT to f/u tomorrow.)   Recommendation   PT - Next Appointment 10/26/21

## 2021-10-25 NOTE — PROGRESS NOTES
Access f/u: Reviewed chart and attempted to interview pt. Pt confused Will f/u. Access will continue to follow.

## 2021-10-25 NOTE — PLAN OF CARE
Goal Outcome Evaluation:  Plan of Care Reviewed With: patient        Progress: improving  Outcome Summary: Pt remains confused at times. Negative CIWA. No c/o pain or discomfort. Continues on room air. PW in plce. VSS. Will closely monitor.

## 2021-10-25 NOTE — PROGRESS NOTES
"DOS: 10/25/2021  NAME: Jaylyn Fuentes   : 1950  PCP: Annie Romero MD  Chief Complaint   Patient presents with   • Fall   • Altered Mental Status   Patient seen in follow-up today; new to me        Neurology     Subjective: Patient with reported periods of confusion although alert oriented on my exam.  She reports right sided headache on my exam.  Patient has known history of migraine headaches previously on Imitrex; now uses Zomig as needed at home.    No family at bedside    Objective:  Vital signs: /96   Pulse 76   Temp 97.6 °F (36.4 °C) (Oral)   Resp 18   Ht 162.6 cm (64\")   Wt 99 kg (218 lb 4.1 oz)   SpO2 94%   BMI 37.46 kg/m²    General: BMI 37.46.  Appears uncomfortable  HEENT: Normocephalic, atraumatic.  Right sided occipital tenderness present  COR: RRR  Resp: Even and unlabored  Extremities: Equal pulses, nondistal embolization    Neurological:   MS: AO. Language normal. No neglect. Higher integrative function normal  CN: II-XII normal  Motor: 5/5, normal tone  Reflexes: Toes downgoing  Sensory: Intact-to light touch  Coordination: Normal-finger-to-nose    Laboratory results:  Lab Results   Component Value Date    GLUCOSE 86 10/25/2021    CALCIUM 8.8 10/25/2021     10/25/2021    K 3.3 (L) 10/25/2021    CO2 24.9 10/25/2021     10/25/2021    BUN 5 (L) 10/25/2021    CREATININE 0.96 10/25/2021    EGFRIFNONA 57 (L) 10/25/2021    BCR 5.2 (L) 10/25/2021    ANIONGAP 12.1 10/25/2021     Lab Results   Component Value Date    WBC 5.43 10/25/2021    HGB 12.2 10/25/2021    HCT 38.3 10/25/2021    MCV 93.0 10/25/2021     10/25/2021                    Review and interpretation of imaging:  MRI BRAIN WITH/WITHOUT CONTRAST 10/24/2021     HISTORY: Memory loss.     Multiple precontrast and postcontrast sagittal, axial and coronal images  were obtained through the brain.     The diffusion-weighted images show no evidence of acute infarction.     FLAIR images show scattered foci " of bright signal intensity in the  bilateral cerebral white matter consistent with mild-to-moderate small  vessel white matter ischemic disease.     No intracranial hemorrhage is seen.     Following gadolinium the postcontrast images show no significant  vascular enhancement. It appears that the contrast was not properly  injected into the vein due to failed IV access. Patient was clinically  doing well with regards to any extravasation.     Craniocervical junction and sella appear normal.     IMPRESSION:  1.  Mild-to-moderate small vessel white matter ischemic disease.  2.  No evidence of acute infarction or hemorrhage.  3.  Limited postcontrast images due to a failed IV access. If further  evaluation is clinically indicated repeat postcontrast images could be  obtained with improved IV access.     This report was finalized on 10/24/2021 8:35 PM by Dr. Reynaldo Tovar M.D.  CT HEAD WO CONTRAST-, CT CERVICAL SPINE WO CONTRAST-     INDICATIONS: Trauma     TECHNIQUE: Radiation dose reduction techniques were utilized, including  automated exposure control and exposure modulation based on body size.  Noncontrast head CT, cervical spine CT     COMPARISON: None available     FINDINGS:     Head CT:     No acute intracranial hemorrhage, midline shift or mass effect. No acute  territorial infarct is identified. Bilateral basal ganglia  mineralization is present.     Mild periventricular hypodensities suggest chronic small vessel ischemic  change in a patient this age.           Ventricles, cisterns, cerebral sulci are unremarkable for patient age.     Mild paranasal sinus mucosal thickening is noted. The visualized  paranasal sinuses, orbits, mastoid air cells are otherwise unremarkable.  Circumscribed subcutaneous densities at the anterior upper scalp, about  1 cm on axial image 42, and another measuring 0.9 cm on image 47, could  be a sebaceous cysts, correlate with clinical exam.           Cervical spine CT:     No  acute fracture or malalignment is noted. Anterior plate and screw  fusion hardware is seen at C3, C5, with intervening corpectomy cage.     A calcified nodule is apparent in the partly included right upper lung,  as well as adjacent partly included indeterminate nodule in the lower  most image, axial image 141 (measures 4 mm to extent partly included),  CT follow-up recommended.                 IMPRESSION:     1. Partly included nonspecific pulmonary nodule, follow-up recommended.  2. No acute intracranial hemorrhage or hydrocephalus. No acute cervical  fracture identified; degenerative and surgical changes of the cervical  spine. If there is further clinical concern, MRI could be considered for  further evaluation.     This report was finalized on 10/22/2021 6:22 PM by Dr. Serg Figueroa M.D.  PORTABLE CHEST 10/22/2021 AT 4:23 PM     CLINICAL HISTORY: Confusion.     Compared to a previous chest dated 09/15/2021.     The lungs appear fairly well-expanded and are free of focal infiltrates.  There are no pleural effusions. The heart is mildly prominent.. A left  shoulder arthroplasty is noted.     IMPRESSIONS: Mild cardiomegaly. No evidence of acute disease within the  chest.     This report was finalized on 10/22/2021 4:47 PM by Dr. Gaston Driscoll M.D.       Impression:  1.  Confusion  2.  Migraine headache: Previously on Imitrex now on Zomig-recommend discontinuing triptan in the near future and following up with neurology.  Should consider CGRP Nurtec versus Emgality  3. Concern for cognitive impairment- OP Neuro psych evaluation    Note: Neurologically, stable with right sided headache present.  CT the head negative for acute findings.  Evidence of nonspecific pulmonary nodule noted on cervical spine imaging; outpatient follow-up recommended.  MRI of the brain showed mild to moderate small vessel white matter ischemic disease.  No evidence of acute mass/hemorrhage/infarct noted. PT/OT/ST. CCP to assist with  discharge planning. Call RRT for any acute neurological changes and/or concerns. We will continue to follow and advise.       Plan:  · Follow-up for nonspecific pulmonary nodule/incidental finding noted on cervical spine imaging  · Recommend discontinuation of triptan in the near future  · Offered occipital nerve block, migraine cocktail in Toradol for headache management which patient declined  · Fioricet 1 tablet every 4 to 6 hours.  For acute headache  · Compazine 10 mg every 8 hours as needed for acute headache  · Follow-up with neurology in 1 to 2 months to further evaluate and treat headaches and dementia work-up        Case reviewed with attending neurologist  and he agrees with plan above.     GRACIE Muir       Addenda:   15:05 EDT    Nursing called to notify me that Fioricet and Compazine had not helped headache; patient previously declined migraine cocktail now agreeable.    Migraine Cocktail:   Benadryl 25 mg IV x1 dose: May repeat if needed in 6 hours  Keppra 500 mg IV x 1; may repeat if needed in 6 hours  Magnesium 1 gram  IV X 1: May repeat if needed in 6 hours    GRACIE Muir

## 2021-10-26 ENCOUNTER — HOME HEALTH ADMISSION (OUTPATIENT)
Dept: HOME HEALTH SERVICES | Facility: HOME HEALTHCARE | Age: 71
End: 2021-10-26

## 2021-10-26 ENCOUNTER — READMISSION MANAGEMENT (OUTPATIENT)
Dept: CALL CENTER | Facility: HOSPITAL | Age: 71
End: 2021-10-26

## 2021-10-26 VITALS
SYSTOLIC BLOOD PRESSURE: 133 MMHG | WEIGHT: 215.9 LBS | TEMPERATURE: 98.7 F | BODY MASS INDEX: 36.86 KG/M2 | RESPIRATION RATE: 16 BRPM | OXYGEN SATURATION: 96 % | HEIGHT: 64 IN | DIASTOLIC BLOOD PRESSURE: 85 MMHG | HEART RATE: 70 BPM

## 2021-10-26 PROBLEM — G43.909 MIGRAINE HEADACHE: Status: ACTIVE | Noted: 2021-10-26

## 2021-10-26 PROBLEM — F01.518 VASCULAR DEMENTIA WITH BEHAVIOR DISTURBANCE (HCC): Status: ACTIVE | Noted: 2021-10-26

## 2021-10-26 LAB
ANION GAP SERPL CALCULATED.3IONS-SCNC: 13.7 MMOL/L (ref 5–15)
BASOPHILS # BLD AUTO: 0.04 10*3/MM3 (ref 0–0.2)
BASOPHILS NFR BLD AUTO: 0.7 % (ref 0–1.5)
BUN SERPL-MCNC: 7 MG/DL (ref 8–23)
BUN/CREAT SERPL: 7.7 (ref 7–25)
CALCIUM SPEC-SCNC: 8.9 MG/DL (ref 8.6–10.5)
CHLORIDE SERPL-SCNC: 106 MMOL/L (ref 98–107)
CO2 SERPL-SCNC: 20.3 MMOL/L (ref 22–29)
CREAT SERPL-MCNC: 0.91 MG/DL (ref 0.57–1)
DEPRECATED RDW RBC AUTO: 49.1 FL (ref 37–54)
EOSINOPHIL # BLD AUTO: 0.16 10*3/MM3 (ref 0–0.4)
EOSINOPHIL NFR BLD AUTO: 2.8 % (ref 0.3–6.2)
ERYTHROCYTE [DISTWIDTH] IN BLOOD BY AUTOMATED COUNT: 14.5 % (ref 12.3–15.4)
GFR SERPL CREATININE-BSD FRML MDRD: 61 ML/MIN/1.73
GLUCOSE SERPL-MCNC: 89 MG/DL (ref 65–99)
HCT VFR BLD AUTO: 37.9 % (ref 34–46.6)
HGB BLD-MCNC: 12.8 G/DL (ref 12–15.9)
IMM GRANULOCYTES # BLD AUTO: 0.04 10*3/MM3 (ref 0–0.05)
IMM GRANULOCYTES NFR BLD AUTO: 0.7 % (ref 0–0.5)
LYMPHOCYTES # BLD AUTO: 1.58 10*3/MM3 (ref 0.7–3.1)
LYMPHOCYTES NFR BLD AUTO: 27.8 % (ref 19.6–45.3)
MCH RBC QN AUTO: 30.8 PG (ref 26.6–33)
MCHC RBC AUTO-ENTMCNC: 33.8 G/DL (ref 31.5–35.7)
MCV RBC AUTO: 91.3 FL (ref 79–97)
MONOCYTES # BLD AUTO: 0.53 10*3/MM3 (ref 0.1–0.9)
MONOCYTES NFR BLD AUTO: 9.3 % (ref 5–12)
NEUTROPHILS NFR BLD AUTO: 3.34 10*3/MM3 (ref 1.7–7)
NEUTROPHILS NFR BLD AUTO: 58.7 % (ref 42.7–76)
NRBC BLD AUTO-RTO: 0.9 /100 WBC (ref 0–0.2)
PLATELET # BLD AUTO: 279 10*3/MM3 (ref 140–450)
PMV BLD AUTO: 9.6 FL (ref 6–12)
POTASSIUM SERPL-SCNC: 4 MMOL/L (ref 3.5–5.2)
RBC # BLD AUTO: 4.15 10*6/MM3 (ref 3.77–5.28)
SODIUM SERPL-SCNC: 140 MMOL/L (ref 136–145)
WBC # BLD AUTO: 5.69 10*3/MM3 (ref 3.4–10.8)

## 2021-10-26 PROCEDURE — 96361 HYDRATE IV INFUSION ADD-ON: CPT

## 2021-10-26 PROCEDURE — G0378 HOSPITAL OBSERVATION PER HR: HCPCS

## 2021-10-26 PROCEDURE — 85025 COMPLETE CBC W/AUTO DIFF WBC: CPT | Performed by: INTERNAL MEDICINE

## 2021-10-26 PROCEDURE — 80048 BASIC METABOLIC PNL TOTAL CA: CPT | Performed by: INTERNAL MEDICINE

## 2021-10-26 PROCEDURE — 99214 OFFICE O/P EST MOD 30 MIN: CPT | Performed by: NURSE PRACTITIONER

## 2021-10-26 PROCEDURE — 97530 THERAPEUTIC ACTIVITIES: CPT

## 2021-10-26 RX ORDER — OLANZAPINE 2.5 MG/1
2.5 TABLET ORAL
Qty: 60 TABLET | Refills: 0 | Status: SHIPPED | OUTPATIENT
Start: 2021-10-26 | End: 2022-03-09

## 2021-10-26 RX ADMIN — BUTALBITAL, ACETAMINOPHEN, AND CAFFEINE 1 TABLET: 50; 325; 40 TABLET ORAL at 15:53

## 2021-10-26 RX ADMIN — CALCIUM CARBONATE-VITAMIN D TAB 500 MG-200 UNIT 500 MG: 500-200 TAB at 09:20

## 2021-10-26 RX ADMIN — LISINOPRIL 20 MG: 20 TABLET ORAL at 09:21

## 2021-10-26 RX ADMIN — PRAVASTATIN SODIUM 20 MG: 20 TABLET ORAL at 09:20

## 2021-10-26 RX ADMIN — PANTOPRAZOLE SODIUM 40 MG: 40 TABLET, DELAYED RELEASE ORAL at 06:17

## 2021-10-26 RX ADMIN — VILAZODONE HYDROCHLORIDE 40 MG: 40 TABLET ORAL at 09:20

## 2021-10-26 RX ADMIN — OXYBUTYNIN CHLORIDE 5 MG: 5 TABLET, EXTENDED RELEASE ORAL at 09:21

## 2021-10-26 RX ADMIN — OLANZAPINE 2.5 MG: 2.5 TABLET ORAL at 09:21

## 2021-10-26 RX ADMIN — PREGABALIN 150 MG: 75 CAPSULE ORAL at 09:20

## 2021-10-26 RX ADMIN — CETIRIZINE HYDROCHLORIDE 10 MG: 10 TABLET ORAL at 09:21

## 2021-10-26 RX ADMIN — SODIUM CHLORIDE 75 ML/HR: 9 INJECTION, SOLUTION INTRAVENOUS at 04:21

## 2021-10-26 NOTE — PROGRESS NOTES
Continued Stay Note  [facility]     Patient Name: Jaylyn Fuentes  MRN: [id number]  Today's Date: 10/26/2021    Admit Date: 10/22/2021     Discharge Plan     Row Name 10/26/21 1655       Plan    Plan Home with significant other and outpatient PT    Plan Comments Notified that [facility] unable to find accepting home health agency.  Obtained an order for outpatient PT for the patient.  Provided with a copy of the outpatient order and Sarah prefers to set up the appointment and provider.  She will transport.....................Allyson Alcala RN               Discharge Codes    No documentation.               Expected Discharge Date and Time     Expected Discharge Date Expected Discharge Time    Oct 26, 2021             Allyson Alcala RN

## 2021-10-26 NOTE — PROGRESS NOTES
"DOS: 10/26/2021  NAME: Jaylyn Fuentes   : 1950  PCP: Annie Romero MD  Chief Complaint   Patient presents with   • Fall   • Altered Mental Status   Patient seen in follow-up today; new to me        Neurology     Subjective: Patient reports significant improvement in headache after receiving migraine cocktail yesterday.  Neurologically she is at baseline.  She denies any new complaints and or concerns on my exam.    Friend/POA at bedside who expresses concerned about mention of \"vascular dementia\".  Patient and friend would like to further pursue neuropsych evaluation as previously mentioned.      Objective:  Vital signs: /85 (BP Location: Left arm, Patient Position: Lying)   Pulse 70   Temp 98.7 °F (37.1 °C) (Oral)   Resp 16   Ht 162.6 cm (64\")   Wt 97.9 kg (215 lb 14.4 oz)   SpO2 96%   BMI 37.06 kg/m²    General: BMI 37.46.  Appears uncomfortable  HEENT: Normocephalic, atraumatic.  Right sided occipital tenderness present; mild/improving  COR: RRR  Resp: Even and unlabored  Extremities: Equal pulses, nondistal embolization    Neurological:   MS: AO. Language normal. No neglect. Higher integrative function normal  CN: II-XII normal  Motor: 5/5, normal tone  Reflexes: Toes downgoing  Sensory: Intact-to light touch  Coordination: Normal-finger-to-nose    Laboratory results:  Lab Results   Component Value Date    GLUCOSE 89 10/26/2021    CALCIUM 8.9 10/26/2021     10/26/2021    K 4.0 10/26/2021    CO2 20.3 (L) 10/26/2021     10/26/2021    BUN 7 (L) 10/26/2021    CREATININE 0.91 10/26/2021    EGFRIFNONA 61 10/26/2021    BCR 7.7 10/26/2021    ANIONGAP 13.7 10/26/2021     Lab Results   Component Value Date    WBC 5.69 10/26/2021    HGB 12.8 10/26/2021    HCT 37.9 10/26/2021    MCV 91.3 10/26/2021     10/26/2021                    Review and interpretation of imaging:  MRI BRAIN WITH/WITHOUT CONTRAST 10/24/2021     HISTORY: Memory loss.     Multiple precontrast and " postcontrast sagittal, axial and coronal images  were obtained through the brain.     The diffusion-weighted images show no evidence of acute infarction.     FLAIR images show scattered foci of bright signal intensity in the  bilateral cerebral white matter consistent with mild-to-moderate small  vessel white matter ischemic disease.     No intracranial hemorrhage is seen.     Following gadolinium the postcontrast images show no significant  vascular enhancement. It appears that the contrast was not properly  injected into the vein due to failed IV access. Patient was clinically  doing well with regards to any extravasation.     Craniocervical junction and sella appear normal.     IMPRESSION:  1.  Mild-to-moderate small vessel white matter ischemic disease.  2.  No evidence of acute infarction or hemorrhage.  3.  Limited postcontrast images due to a failed IV access. If further  evaluation is clinically indicated repeat postcontrast images could be  obtained with improved IV access.     This report was finalized on 10/24/2021 8:35 PM by Dr. Reynaldo Tovar M.D.  CT HEAD WO CONTRAST-, CT CERVICAL SPINE WO CONTRAST-     INDICATIONS: Trauma     TECHNIQUE: Radiation dose reduction techniques were utilized, including  automated exposure control and exposure modulation based on body size.  Noncontrast head CT, cervical spine CT     COMPARISON: None available     FINDINGS:     Head CT:     No acute intracranial hemorrhage, midline shift or mass effect. No acute  territorial infarct is identified. Bilateral basal ganglia  mineralization is present.     Mild periventricular hypodensities suggest chronic small vessel ischemic  change in a patient this age.           Ventricles, cisterns, cerebral sulci are unremarkable for patient age.     Mild paranasal sinus mucosal thickening is noted. The visualized  paranasal sinuses, orbits, mastoid air cells are otherwise unremarkable.  Circumscribed subcutaneous densities at the  anterior upper scalp, about  1 cm on axial image 42, and another measuring 0.9 cm on image 47, could  be a sebaceous cysts, correlate with clinical exam.           Cervical spine CT:     No acute fracture or malalignment is noted. Anterior plate and screw  fusion hardware is seen at C3, C5, with intervening corpectomy cage.     A calcified nodule is apparent in the partly included right upper lung,  as well as adjacent partly included indeterminate nodule in the lower  most image, axial image 141 (measures 4 mm to extent partly included),  CT follow-up recommended.                 IMPRESSION:     1. Partly included nonspecific pulmonary nodule, follow-up recommended.  2. No acute intracranial hemorrhage or hydrocephalus. No acute cervical  fracture identified; degenerative and surgical changes of the cervical  spine. If there is further clinical concern, MRI could be considered for  further evaluation.     This report was finalized on 10/22/2021 6:22 PM by Dr. Serg Figueroa M.D.  PORTABLE CHEST 10/22/2021 AT 4:23 PM     CLINICAL HISTORY: Confusion.     Compared to a previous chest dated 09/15/2021.     The lungs appear fairly well-expanded and are free of focal infiltrates.  There are no pleural effusions. The heart is mildly prominent.. A left  shoulder arthroplasty is noted.     IMPRESSIONS: Mild cardiomegaly. No evidence of acute disease within the  chest.     This report was finalized on 10/22/2021 4:47 PM by Dr. Gaston Driscoll M.D.       Impression:  1.  Confusion  2.  Migraine headache: Previously on Imitrex now on Zomig-recommend discontinuing triptan in the near future and following up with neurology.  Should consider CGRP Nurtec versus Emgality  3. Concern for cognitive impairment with both visual and auditory hallucinations reported- OP Neuro psych evaluation    Note: Neurologically, stable-headache since resolved.  CT the head negative for acute findings.  Evidence of nonspecific pulmonary nodule  noted on cervical spine imaging; outpatient follow-up recommended.  MRI of the brain showed mild to moderate small vessel white matter ischemic disease.  No evidence of acute mass/hemorrhage/infarct noted. No further neurology workup indicated. We will sign off and see again upon request.        Plan:  · Follow-up for nonspecific pulmonary nodule/incidental finding noted on cervical spine imaging  · Recommend discontinuation of triptan in the near future; we will leave samples at the  of Nurtec which can be a can once daily to assist with headache abortive  · Riboflavin 400 mg daily  · Occipital nerve block in the future if needed  · Follow-up with neurology in 1 to 2 months to further evaluate and treat headaches and dementia work-up        Case reviewed with attending neurologist  10/26 and he agrees with plan above.     Haily Clark APRN

## 2021-10-26 NOTE — PROGRESS NOTES
Continued Stay Note  [facility]     Patient Name: Jaylyn Fuentes  MRN: [id number]  Today's Date: 10/26/2021    Admit Date: 10/22/2021     Discharge Plan     Row Name 10/26/21 1157       Plan    Plan Home with rerferrals for home health pending    Plan Comments Met with patient and Sarah at bedside.  Patient plans home at [place] and discussed home health services.  Patient would prefer [facility] as her first choice and [facility] as her second choice.  Referrals are pending in [facility].  Lexis will transport...........................Allyson Alcala RN               Discharge Codes    No documentation.               Expected Discharge Date and Time     Expected Discharge Date Expected Discharge Time    Oct 26, 2021             Allyson Alcala RN

## 2021-10-26 NOTE — PROGRESS NOTES
Access follow up:  Reviewed the chart, checked in with nursing staff, met with patient.  Patient was planning on discharge today.  She talked about all the activities that she can no longer do as a result of her medical problems.  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  Patient had several falls prior to admission that she says resulted from weakness.  She denies drinking at the time of the falls.  Also mentioned unexplained visual hallucinations that frightened her.  Her speech was somewhat tangential but she was aware of jumping around and made an attempt to return to the topic at hand.  Educated to the increased risk of falls when drinking.

## 2021-10-26 NOTE — PROGRESS NOTES
Continued Stay Note  [facility]     Patient Name: Jaylyn Fuentes  MRN: [id number]  Today's Date: 10/26/2021    Admit Date: 10/22/2021     Discharge Plan     Row Name 10/26/21 1727       Plan    Final Discharge Disposition Code 01 - home or self-care    Final Note Home with outpatient PT    Row Name 10/26/21 1655       Plan    Plan Home with significant other and outpatient PT    Plan Comments Notified that [facility] unable to find accepting home health agency.  Obtained an order for outpatient PT for the patient.  Provided with a copy of the outpatient order and Sarah prefers to set up the appointment and provider.  She will transport.....................Allyson Alcala RN               Discharge Codes    No documentation.               Expected Discharge Date and Time     Expected Discharge Date Expected Discharge Time    Oct 26, 2021             Allyson Alcala RN

## 2021-10-26 NOTE — PROGRESS NOTES
Continued Stay Note  [facility]     Patient Name: Jaylyn Fuentes  MRN: [id number]  Today's Date: 10/26/2021    Admit Date: 10/22/2021     Discharge Plan     Row Name 10/26/21 6144       Plan    Plan Home health referrals are pending    Plan Comments [facility] declines due to insurance, [facility] declines due to staffing.  Notified the patient and she would like a referral to [facility].  She would like Sarah to make decisions about other choices.  Spoke with Sarah via outging call and she states it is ok to use any HH agency and gave [facility] permission to make referrals to all that service [place]...................Allyson Alcala RN    Row Name 10/26/21 0876       Plan    Plan Home with rerferrals for home health pending    Plan Comments Met with patient and Sarah at bedside.  Patient plans home at [place] and discussed home health services.  Patient would prefer [facility] as her first choice and [facility] as her second choice.  Referrals are pending in [facility].  Lexis will transport...........................Allyson Alcala RN               Discharge Codes    No documentation.               Expected Discharge Date and Time     Expected Discharge Date Expected Discharge Time    Oct 26, 2021             Allyson Alcala RN

## 2021-10-26 NOTE — PLAN OF CARE
Goal Outcome Evaluation:  Plan of Care Reviewed With: patient, spouse        Progress: no change  Outcome Summary: patient remains confused but agreeable. She accepts the need of continuing skilled care at home

## 2021-10-26 NOTE — PLAN OF CARE
Goal Outcome Evaluation:  Plan of Care Reviewed With: patient        Progress: improving  Outcome Summary: No acute changes overnight. Remains confused at times. C/o headache medication given. Up to bathroom with 1 assist. VSS. Will cloely monitor.

## 2021-10-26 NOTE — PLAN OF CARE
Goal Outcome Evaluation:  Plan of Care Reviewed With: patient        Progress: improving  Outcome Summary: Patient is agreeable to PT this AM. Pt is pleasantly confused throughout session. Pt completed bed mobility with SBA and STS to rwx requiring CGA. Pt increased ambulation distance to 175ft using rwx requiring CGA. No LOB but decreased safety awareness throughout with cues required. Pt encouraged to ambulate in hallway with nsg staff. Pt will continue to benefit from skilled PT to address functional deficits.    Patient was intermittently wearing a face mask during this therapy encounter. Therapist used appropriate personal protective equipment including eye protection, mask, and gloves.  Mask used was standard procedure mask. Appropriate PPE was worn during the entire therapy session. Hand hygiene was completed before and after therapy session. Patient is not in enhanced droplet precautions.

## 2021-10-26 NOTE — DISCHARGE INSTRUCTIONS
· Follow-up with primary care MD for nonspecific pulmonary nodule/incidental finding noted on cervical spine imaging      Mild Neurocognitive Disorder  Mild neurocognitive disorder, formerly known as mild cognitive impairment, is a disorder in which memory does not work as well as it should. This disorder may also cause problems with other mental functions, including thought, communication, behavior, and completion of tasks. These problems can be noticed and measured, but they usually do not interfere with daily activities or the ability to live independently.  Mild neurocognitive disorder typically develops after 60 years of age, but it can also develop at younger ages. It is not as serious as major neurocognitive disorder, also known as dementia, but it may be the first sign of it. Generally, symptoms of this condition get worse over time. In rare cases, symptoms can get better.  What are the causes?  This condition may be caused by:  · Brain disorders like Alzheimer's disease, Parkinson's disease, and other conditions that gradually damage nerve cells (neurodegenerative conditions).  · Diseases that affect blood vessels in the brain and result in small strokes.  · Certain infections, such as HIV.  · Traumatic brain injury.  · Other medical conditions, such as brain tumors, underactive thyroid (hypothyroidism), and vitamin B12 deficiency.  · Use of certain drugs or prescription medicines.  What increases the risk?  The following factors may make you more likely to develop this condition:  · Being older than 65 years.  · Being male.  · Low education level.  · Diabetes, high blood pressure, high cholesterol, and other conditions that increase the risk for blood vessel diseases.  · Untreated or undertreated sleep apnea.  · Having a certain type of gene that can be passed from parent to child (inherited).  · Chronic health problems such as heart disease, lung disease, liver disease, kidney disease, or  depression.  What are the signs or symptoms?  Symptoms of this condition include:  · Difficulty remembering. You may:  ? Forget names, phone numbers, or details of recent events.  ? Forget social events and appointments.  ? Repeatedly forget where you put your car keys or other items.  · Difficulty thinking and solving problems. You may have trouble with complex tasks, such as:  ? Paying bills.  ? Driving in unfamiliar places.  · Difficulty communicating. You may have trouble:  ? Finding the right word or naming an object.  ? Forming a sentence that makes sense, or understanding what you read or hear.  · Changes in your behavior or personality. When this happens, you may:  ? Lose interest in the things that you used to enjoy.  ? Withdraw from social situations.  ? Get angry more easily than usual.  ? Act before thinking.  How is this diagnosed?  This condition is diagnosed based on:  · Your symptoms. Your health care provider may ask you and the people you spend time with, such as family and friends, about your symptoms.  · Evaluation of mental functions (neuropsychological testing). Your health care provider may refer you to a neurologist or mental health specialist to evaluate your mental functions in detail.  To identify the cause of your condition, your health care provider may:  · Get a detailed medical history.  · Ask about use of alcohol, drugs, and prescription medicines.  · Do a physical exam.  · Order blood tests and brain imaging exams.  How is this treated?  Mild neurocognitive disorder that is caused by medicine use, drug use, infection, or another medical condition may improve when the cause is treated, or when medicines or drugs are stopped. If this disorder has another cause, it generally does not improve and may get worse. In these cases, the goal of treatment is to help you manage the loss of mental function. Treatments in these cases include:  · Medicine. Medicine mainly helps memory and behavior  symptoms.  · Talk therapy. Talk therapy provides education, emotional support, memory aids, and other ways of making up for problems with mental function.  · Lifestyle changes, including:  ? Getting regular exercise.  ? Eating a healthy diet that includes omega-3 fatty acids.  ? Challenging your thinking and memory skills.  ? Having more social interaction.  Follow these instructions at home:  Eating and drinking    · Drink enough fluid to keep your urine pale yellow.  · Eat a healthy diet that includes omega-3 fatty acids. These can be found in:  ? Fish.  ? Nuts.  ? Leafy vegetables.  ? Vegetable oils.  · If you drink alcohol:  ? Limit how much you use to:  § 0-1 drink a day for women.  § 0-2 drinks a day for men.  ? Be aware of how much alcohol is in your drink. In the [place] one drink equals one 12 oz bottle of beer (355 mL), one 5 oz glass of wine (148 mL), or one 1½ oz glass of hard liquor (44 mL).    Lifestyle    · Get regular exercise as told by your health care provider.  · Do not use any products that contain nicotine or tobacco, such as cigarettes, e-cigarettes, and chewing tobacco. If you need help quitting, ask your health care provider.  · Practice ways to manage stress. If you need help managing stress, ask your health care provider.  · Continue to have social interaction.  · Keep your mind active with stimulating activities you enjoy, such as reading or playing games.  · Make sure to get quality sleep. Follow these tips:  ? Avoid napping during the day.  ? Keep your sleeping area dark and cool.  ? Avoid exercising during the few hours before you go to bed.  ? Avoid caffeine products in the evening.    General instructions  · Take over-the-counter and prescription medicines only as told by your health care provider. Your health care provider may recommend that you avoid taking medicines that can affect thinking, such as pain medicines or sleep medicines.  · Work with your health care provider to find  out what you need help with and what your safety needs are.  · Keep all follow-up visits. This is important.  Where to find more information  · National [place] on Aging: www.patricia.nih.gov  Contact a health care provider if:  · You have any new symptoms.  Get help right away if:  · You develop new confusion or your confusion gets worse.  · You act in ways that place you or your family in danger.  Summary  · Mild neurocognitive disorder is a disorder in which memory does not work as well as it should.  · Mild neurocognitive disorder can have many causes. It may be the first stage of dementia.  · To manage your condition, get regular exercise, keep your mind active, get quality sleep, and eat a healthy diet.  This information is not intended to replace advice given to you by your health care provider. Make sure you discuss any questions you have with your health care provider.  Document Revised: 05/03/2021 Document Reviewed: 05/03/2021  Elsevier Patient Education © 2021 [employer]

## 2021-10-26 NOTE — DISCHARGE PLACEMENT REQUEST
"Jaylyn Guzmán (71 y.o. Female)             Date of Birth Social Security Number Address Home Phone MRN    1950  [address] [place] [address] [phone] [id number]    [employer] Marital Status             [employer] Single       Admission Date Admission Type Admitting Provider Attending Provider Department, Room/Bed    10/22/21 Emergency Sting[facility] benson MD Reddy, Rahul Kandada, MD [address], [address]    Discharge Date Discharge Disposition Discharge Destination           Home or Self Care              Attending Provider: Yogi Morales MD    Allergies: Baclofen, Keflex [Cephalexin], Sulfa Antibiotics    Isolation: None   Infection: None   Code Status: CPR   Advance Care Planning Activity    Ht: 162.6 cm (64\")   Wt: 97.9 kg (215 lb 14.4 oz)    Admission Cmt: None   Principal Problem: Vascular dementia with behavior disturbance (HCC) [F01.51]                 Active Insurance as of 10/22/2021     Primary Coverage     Payor Plan Insurance Group Employer/Plan Group    [facility] MEDICARE REPLACEMENT [facility] MEDICARE REPLACEMENT [id number]     Payor Plan Address Payor Plan Phone Number Payor Plan Fax Number Effective Dates    [address] [address]   1/1/2016 - None Entered    [facility] [id number]       Subscriber Name Subscriber Birth Date Member ID       JAYLYN GUZMÁN 1950 [id number]                 Emergency Contacts      (Rel.) Home Phone Work Phone Mobile Phone    Yen(MYAH)Sarah (Partner) [phone] -- --    Tiffanie Gonzalez (Sister) [phone] -- --              "

## 2021-10-26 NOTE — DISCHARGE SUMMARY
Patient Name: Jaylyn Fuentes  : 1950  MRN: [id number]    Date of Admission: 10/22/2021  Date of Discharge:  10/26/2021  Primary Care Physician: Annie Romero MD      Chief Complaint:   Fall and Altered Mental Status      Discharge Diagnoses     Active Hospital Problems    Diagnosis  POA   • **Vascular dementia with behavior disturbance (HCC) [F01.51]  Unknown   • Migraine headache [G43.909]  Yes   • Frequent falls [R29.6]  Not Applicable   • Class 2 obesity [E66.9]  Yes   • Hypertension [I10]  Yes      Resolved Hospital Problems   No resolved problems to display.        Hospital Course     71 year old female who presented to the emergency room with confusion and frequent falls; she fell out of bed and   Was found to be confused therefore was brought to the emergency room for further evaluation.  Upon arrival routine workup was done and WBC was normal and patient was afebrile and there was no evidence of any infection that was noted.  UA with no evidence of any UTI.  CT of the brain with no acute findings.    1. Episodes of altered mental status with  Hallucinations and balance issues, neurology did evaluate and felt this could be related to vascular dementia and recommended low-dose Zyprexa.  CT of the brain with no acute findings and   MRI of the brain was also done with no acute findings.   Patient has been doing reasonably well with Zyprexa.  Physical therapy did evaluate and felt safe to go home but however needs some supervision at home and significant other is willing to care for her.  Follow-up with neurology as an outpatient basis.  2.  History of ethanol abuse, patient states currently she does not drink alcohol however will monitor for withdrawals.  3.  Hypertension, on lisinopril and will be continued.  4.  Neuropathy, on Lyrica.        Day of Discharge     Physical Exam:  Temp:  [97.8 °F (36.6 °C)-98.7 °F (37.1 °C)] 98.7 °F (37.1 °C)  Heart Rate:  [60-70] 70  Resp:  [16-18] 16  BP:  (120-163)/() 133/85  Body mass index is 37.06 kg/m².  Physical Exam  Constitutional:       Appearance: Normal appearance. She is obese.   HENT:      Head: Normocephalic and atraumatic.      Nose: Nose normal.      Mouth/Throat:      Mouth: Mucous membranes are moist.   Eyes:      Extraocular Movements: Extraocular movements intact.      Conjunctiva/sclera: Conjunctivae normal.      Pupils: Pupils are equal, round, and reactive to light.   Cardiovascular:      Rate and Rhythm: Normal rate and regular rhythm.      Pulses: Normal pulses.      Heart sounds: Normal heart sounds. No murmur heard.  Pulmonary:      Effort: Pulmonary effort is normal. No respiratory distress.      Breath sounds: Normal breath sounds.   Abdominal:      General: Bowel sounds are normal. There is no distension.      Palpations: Abdomen is soft.      Tenderness: There is no abdominal tenderness.   Musculoskeletal:      Cervical back: Normal range of motion and neck supple.      Right lower leg: No edema.      Left lower leg: No edema.   Skin:     General: Skin is warm and dry.      Coloration: Skin is not jaundiced.   Neurological:      General: No focal deficit present.      Mental Status: She is alert and oriented to person, place, and time. Mental status is at baseline.   Psychiatric:         Mood and Affect: Mood normal.         Thought Content: Thought content normal.         Judgment: Judgment normal.          Consultants     Consult Orders (all) (From admission, onward)     Start     Ordered    10/23/21 1835  Inpatient Neurology Consult General  Once        Specialty:  Neurology  Provider:  Roverto Olson MD    10/23/21 1835    10/22/21 2101  Inpatient Case Management  Consult  Once        Provider:  (Not yet assigned)    10/22/21 2100    10/22/21 2001  Inpatient consult to Access Center  Once        Provider:  (Not yet assigned)    10/22/21 2000    10/22/21 2001  Inpatient consult to Nutrition  Once        Provider:   (Not yet assigned)    10/22/21 2000    10/22/21 1828  [facility] (on-call MD unless specified) Details  Once        Specialty:  Hospitalist  Provider:  Noris Arriola MD    10/22/21 1827              Procedures     * Surgery not found *      Imaging Results (All)     Procedure Component Value Units Date/Time    MRI Brain With & Without Contrast [[id number]] Collected: 10/24/21 1723     Updated: 10/24/21 2038    Narrative:      MRI BRAIN WITH/WITHOUT CONTRAST 10/24/2021     HISTORY: Memory loss.     Multiple precontrast and postcontrast sagittal, axial and coronal images  were obtained through the brain.     The diffusion-weighted images show no evidence of acute infarction.     FLAIR images show scattered foci of bright signal intensity in the  bilateral cerebral white matter consistent with mild-to-moderate small  vessel white matter ischemic disease.     No intracranial hemorrhage is seen.     Following gadolinium the postcontrast images show no significant  vascular enhancement. It appears that the contrast was not properly  injected into the vein due to failed IV access. Patient was clinically  doing well with regards to any extravasation.     Craniocervical junction and sella appear normal.       Impression:      1.  Mild-to-moderate small vessel white matter ischemic disease.  2.  No evidence of acute infarction or hemorrhage.  3.  Limited postcontrast images due to a failed IV access. If further  evaluation is clinically indicated repeat postcontrast images could be  obtained with improved IV access.     This report was finalized on 10/24/2021 8:35 PM by Dr. Reynaldo Tovar M.D.       CT Head Without Contrast [[id number]] Collected: 10/22/21 1816     Updated: 10/22/21 1825    Narrative:      CT HEAD WO CONTRAST-, CT CERVICAL SPINE WO CONTRAST-     INDICATIONS: Trauma     TECHNIQUE: Radiation dose reduction techniques were utilized, including  automated exposure control and exposure modulation based on body  size.  Noncontrast head CT, cervical spine CT     COMPARISON: None available     FINDINGS:     Head CT:     No acute intracranial hemorrhage, midline shift or mass effect. No acute  territorial infarct is identified. Bilateral basal ganglia  mineralization is present.     Mild periventricular hypodensities suggest chronic small vessel ischemic  change in a patient this age.           Ventricles, cisterns, cerebral sulci are unremarkable for patient age.     Mild paranasal sinus mucosal thickening is noted. The visualized  paranasal sinuses, orbits, mastoid air cells are otherwise unremarkable.  Circumscribed subcutaneous densities at the anterior upper scalp, about  1 cm on axial image 42, and another measuring 0.9 cm on image 47, could  be a sebaceous cysts, correlate with clinical exam.           Cervical spine CT:     No acute fracture or malalignment is noted. Anterior plate and screw  fusion hardware is seen at C3, C5, with intervening corpectomy cage.     A calcified nodule is apparent in the partly included right upper lung,  as well as adjacent partly included indeterminate nodule in the lower  most image, axial image 141 (measures 4 mm to extent partly included),  CT follow-up recommended.                   Impression:         1. Partly included nonspecific pulmonary nodule, follow-up recommended.  2. No acute intracranial hemorrhage or hydrocephalus. No acute cervical  fracture identified; degenerative and surgical changes of the cervical  spine. If there is further clinical concern, MRI could be considered for  further evaluation.     This report was finalized on 10/22/2021 6:22 PM by Dr. Serg Figueroa M.D.       CT Cervical Spine Without Contrast [[id number]] Collected: 10/22/21 1816     Updated: 10/22/21 1825    Narrative:      CT HEAD WO CONTRAST-, CT CERVICAL SPINE WO CONTRAST-     INDICATIONS: Trauma     TECHNIQUE: Radiation dose reduction techniques were utilized, including  automated exposure  control and exposure modulation based on body size.  Noncontrast head CT, cervical spine CT     COMPARISON: None available     FINDINGS:     Head CT:     No acute intracranial hemorrhage, midline shift or mass effect. No acute  territorial infarct is identified. Bilateral basal ganglia  mineralization is present.     Mild periventricular hypodensities suggest chronic small vessel ischemic  change in a patient this age.           Ventricles, cisterns, cerebral sulci are unremarkable for patient age.     Mild paranasal sinus mucosal thickening is noted. The visualized  paranasal sinuses, orbits, mastoid air cells are otherwise unremarkable.  Circumscribed subcutaneous densities at the anterior upper scalp, about  1 cm on axial image 42, and another measuring 0.9 cm on image 47, could  be a sebaceous cysts, correlate with clinical exam.           Cervical spine CT:     No acute fracture or malalignment is noted. Anterior plate and screw  fusion hardware is seen at C3, C5, with intervening corpectomy cage.     A calcified nodule is apparent in the partly included right upper lung,  as well as adjacent partly included indeterminate nodule in the lower  most image, axial image 141 (measures 4 mm to extent partly included),  CT follow-up recommended.                   Impression:         1. Partly included nonspecific pulmonary nodule, follow-up recommended.  2. No acute intracranial hemorrhage or hydrocephalus. No acute cervical  fracture identified; degenerative and surgical changes of the cervical  spine. If there is further clinical concern, MRI could be considered for  further evaluation.     This report was finalized on 10/22/2021 6:22 PM by Dr. Serg Figueroa M.D.       XR Chest 1 View [[id number]] Collected: 10/22/21 1645     Updated: 10/22/21 1650    Narrative:      PORTABLE CHEST 10/22/2021 AT 4:23 PM     CLINICAL HISTORY: Confusion.     Compared to a previous chest dated 09/15/2021.     The lungs appear  fairly well-expanded and are free of focal infiltrates.  There are no pleural effusions. The heart is mildly prominent.. A left  shoulder arthroplasty is noted.     IMPRESSIONS: Mild cardiomegaly. No evidence of acute disease within the  chest.     This report was finalized on 10/22/2021 4:47 PM by Dr. Gaston Driscoll M.D.           Results for orders placed during the hospital encounter of 02/20/17    Duplex venous upper extremity left CAR    Interpretation Summary  · Acute left upper extremity superficial thrombophlebitis noted in the cephalic (upper arm).  · All other left sided vessels appear normal.      Pertinent Labs     Results from last 7 days   Lab Units 10/26/21  0658 10/25/21  0426 10/24/21  0524 10/23/21  0554   WBC 10*3/mm3 5.69 5.43 5.37 4.61   HEMOGLOBIN g/dL 12.8 12.2 11.7* 11.1*   PLATELETS 10*3/mm3 279 267 181 243     Results from last 7 days   Lab Units 10/26/21  0658 10/25/21  2149 10/25/21  0426 10/24/21  0524 10/23/21  0820 10/23/21  0820   SODIUM mmol/L 140  --  143 138  --  142   POTASSIUM mmol/L 4.0 4.3 3.3* 3.6   < > 4.1   CHLORIDE mmol/L 106  --  106 100  --  106   CO2 mmol/L 20.3*  --  24.9 27.8  --  25.4   BUN mg/dL 7*  --  5* 40*  --  10   CREATININE mg/dL 0.91  --  0.96 1.10*  --  0.99   GLUCOSE mg/dL 89  --  86 101*  --  108*    < > = values in this interval not displayed.   Estimated Creatinine Clearance: 63.5 mL/min (by C-G formula based on SCr of 0.91 mg/dL).  Results from last 7 days   Lab Units 10/22/21  1646   ALBUMIN g/dL 3.40*   BILIRUBIN mg/dL 0.3   ALK PHOS U/L 70   AST (SGOT) U/L 19   ALT (SGPT) U/L 12     Results from last 7 days   Lab Units 10/26/21  0658 10/25/21  1258 10/25/21  0426 10/24/21  0524 10/23/21  0820 10/22/21  1646 10/22/21  1646   CALCIUM mg/dL 8.9  --  8.8 8.6 8.6   < > 8.5*   ALBUMIN g/dL  --   --   --   --   --   --  3.40*   MAGNESIUM mg/dL  --  1.9  --   --   --   --   --     < > = values in this interval not displayed.     Results from last 7 days    Lab Units 10/22/21  2131   AMMONIA umol/L 22             Invalid input(s): LDLCALC      Results from last 7 days   Lab Units 10/22/21  1747   COVID19  Not Detected       Test Results Pending at Discharge       Discharge Details        Discharge Medications      New Medications      Instructions Start Date   OLANZapine 2.5 MG tablet  Commonly known as: zyPREXA   2.5 mg, Oral, Daily With Breakfast & Dinner         Continue These Medications      Instructions Start Date   albuterol sulfate  (90 Base) MCG/ACT inhaler  Commonly known as: PROVENTIL HFA;VENTOLIN HFA;PROAIR HFA   2 puffs, Inhalation, Every 6 Hours PRN      Bismuth 262 MG chewable tablet   524 mg, Oral, 4 Times Daily      calcium carbonate-cholecalciferol 500-400 MG-UNIT tablet tablet   Oral, Daily      cetirizine 10 MG tablet  Commonly known as: zyrTEC   10 mg, Oral, Daily      Dexilant 60 MG capsule  Generic drug: dexlansoprazole   Do not crush or chew.      diphenoxylate-atropine 2.5-0.025 MG per tablet  Commonly known as: LOMOTIL   1 tablet, Oral, 4 Times Daily PRN      fesoterodine fumarate 8 MG tablet sustained-release 24 hour tablet  Commonly known as: TOVIAZ ER   Oral, Every 24 Hours Scheduled      HYDROcodone-acetaminophen  MG per tablet  Commonly known as: NORCO   1 tablet, Oral, Every 8 Hours PRN      lisinopril 20 MG tablet  Commonly known as: PRINIVIL,ZESTRIL   20 mg, Oral, Daily      omeprazole 40 MG capsule  Commonly known as: priLOSEC   40 mg, Oral, 2 Times Daily      ondansetron 8 MG tablet  Commonly known as: ZOFRAN   8 mg, Oral, Every 8 Hours PRN      pravastatin 20 MG tablet  Commonly known as: PRAVACHOL   20 mg, Oral, Daily      pregabalin 75 MG capsule  Commonly known as: LYRICA   150 mg, Oral, 2 Times Daily, 300mg PO at night      vilazodone 40 MG tablet tablet  Commonly known as: VIIBRYD   40 mg, Oral, Daily      ZOLMitriptan 5 MG tablet  Commonly known as: ZOMIG   5 mg, Oral, Once As Needed         Stop These  "Medications    cyclobenzaprine 10 MG tablet  Commonly known as: FLEXERIL     metroNIDAZOLE 250 MG tablet  Commonly known as: FLAGYL     tetracycline 500 MG capsule  Commonly known as: ACHROMYCIN,SUMYCIN            Allergies   Allergen Reactions   • Baclofen Other (See Comments)     \"makes me crazy\"   • Keflex [Cephalexin] GI Intolerance   • Sulfa Antibiotics Rash     SKIN BRIGHT RED        Discharge Disposition:  Home or Self Care      Discharge Diet:  Diet Order   Procedures   • Diet Regular; Cardiac       Discharge Activity:   Activity Instructions     Activity as Tolerated            CODE STATUS:    Code Status and Medical Interventions:   Ordered at: 10/22/21 2000     Code Status:    CPR     Medical Interventions (Level of Support Prior to Arrest):    Full       Future Appointments   Date Time Provider Department Center   11/14/2021  8:30 PM [facility] SLEEP RO[facility]S [facility] SLEEP [facility]     Additional Instructions for the Follow-ups that You Need to Schedule     Ambulatory Referral to Physical Therapy Evaluate and treat   As directed      Please send Plan of Care & updates to Annie Romero MD, PCP    Order Comments: Please send Plan of Care & updates to Annie Romero MD, PCP     Specialty needed: Evaluate and treat         Discharge Follow-up with PCP   As directed       Currently Documented PCP:    Annie Romero MD    PCP Phone Number:    [phone]     Follow Up Details: 2 weeks         Discharge Follow-up with Specified Provider: ; 1 Month   As directed      To:     Follow Up: 1 Month            Follow-up Information     Annie Romero MD .    Specialty: Internal Medicine  Why: 2 weeks  Contact information:  [address]  [facility] [id number] [phone]             Trevor Mast MD Follow up in 1 month(s).    Specialty: Neurology  Contact information:  [address]  [place] [address]  [phone]             Haily Clark APRN Follow up.  "   Specialties: Neurology, Nurse Practitioner, Emergency Medicine  Contact information:  [address]  [place] [id number]  [phone]                         Additional Instructions for the Follow-ups that You Need to Schedule     Ambulatory Referral to Physical Therapy Evaluate and treat   As directed      Please send Plan of Care & updates to Annie Romero MD, PCP    Order Comments: Please send Plan of Care & updates to Annie Romero MD, PCP     Specialty needed: Evaluate and treat         Discharge Follow-up with PCP   As directed       Currently Documented PCP:    Annie Romero MD    PCP Phone Number:    [phone]     Follow Up Details: 2 weeks         Discharge Follow-up with Specified Provider: ; 1 Month   As directed      To:     Follow Up: 1 Month           Time Spent on Discharge:  Greater than 30 minutes      Yogi Morales MD  [facility]  10/26/21  17:32 EDT

## 2021-10-27 NOTE — OUTREACH NOTE
Prep Survey      Responses   [facility] facility patient discharged from? [facility]   Is LACE score < 7 ? No   Emergency Room discharge w/ pulse ox? No   Eligibility Readm Mgmt   Discharge diagnosis Vascular dementia with behavior disturbance   Does the patient have one of the following disease processes/diagnoses(primary or secondary)? Other   Does the patient have Home health ordered? No   Is there a DME ordered? No   Prep survey completed? Yes          Shania Begum RN

## 2021-10-28 ENCOUNTER — READMISSION MANAGEMENT (OUTPATIENT)
Dept: CALL CENTER | Facility: HOSPITAL | Age: 71
End: 2021-10-28

## 2021-10-28 ENCOUNTER — TELEPHONE (OUTPATIENT)
Dept: NEUROLOGY | Facility: CLINIC | Age: 71
End: 2021-10-28

## 2021-10-28 NOTE — TELEPHONE ENCOUNTER
Caller: LOREN GUZMÁN     Relationship to patient: SELF    Best call back number: ROOM MATE/POA- PT GIVES THE VERBAL OK FOR ZAIRA MATA TO SPEAK ON HER BEHALF TO SCHEDULE APPTS.PHONE:[phone]    New or established patient?  [x] New  [] Established    Date of discharge: 10- TO 10-    Facility discharged from: [facility]      Diagnosis/Symptoms: Vascular dementia with behavior disturbance       Length of stay (If applicable): 4 Days    Specialty Only: Did you see a [facility] provider?    [x] Yes  [] No  If so, who? allie Streeter/DR. JENNINGS    PT IS TO FOLLOW UP WITH JAMIN STREETER & DR. ALONSO.    PLEASE ADVISE ON WHO TO SCHEDULE WITH

## 2021-10-28 NOTE — OUTREACH NOTE
"Medical Week 1 Survey      Responses   [facility] patient discharged from? [facility]   Does the patient have one of the following disease processes/diagnoses(primary or secondary)? Other   Week 1 attempt successful? Yes   Call start time 1432   Revoke Decline to participate  [Sarah declines calls stating \"it just confuses Pat\".  ]   Call end time 1439   Discharge diagnosis Vascular dementia with behavior disturbance   Person spoke with today (if not patient) and relationship patient and Sarah(caregiver)   Meds reviewed with patient/caregiver? Yes   Is the patient having any side effects they believe may be caused by any medication additions or changes? No   Does the patient have all medications ordered at discharge? Yes   Is the patient taking all medications as directed (includes completed medication regime)? Yes   Does the patient have a primary care provider?  Yes   Does the patient have an appointment with their PCP within 7 days of discharge? Yes   Has the patient kept scheduled appointments due by today? Yes   Psychosocial issues? No   Did the patient receive a copy of their discharge instructions? Yes   Nursing interventions Reviewed instructions with patient   What is the patient's perception of their health status since discharge? Improving   Is the patient/caregiver able to teach back signs and symptoms related to disease process for when to call PCP? Yes   Is the patient/caregiver able to teach back signs and symptoms related to disease process for when to call 911? Yes   If the patient is a current smoker, are they able to teach back resources for cessation? Not a smoker   Week 1 call completed? Yes          Cyndie Kohli RN  "

## 2021-11-08 ENCOUNTER — TRANSCRIBE ORDERS (OUTPATIENT)
Dept: SLEEP MEDICINE | Facility: HOSPITAL | Age: 71
End: 2021-11-08

## 2021-11-08 DIAGNOSIS — Z01.818 OTHER SPECIFIED PRE-OPERATIVE EXAMINATION: Primary | ICD-10-CM

## 2021-11-12 ENCOUNTER — LAB (OUTPATIENT)
Dept: LAB | Facility: HOSPITAL | Age: 71
End: 2021-11-12

## 2021-11-12 DIAGNOSIS — Z01.818 OTHER SPECIFIED PRE-OPERATIVE EXAMINATION: ICD-10-CM

## 2021-11-12 LAB — SARS-COV-2 ORF1AB RESP QL NAA+PROBE: NOT DETECTED

## 2021-11-12 PROCEDURE — U0004 COV-19 TEST NON-CDC HGH THRU: HCPCS

## 2021-11-12 PROCEDURE — C9803 HOPD COVID-19 SPEC COLLECT: HCPCS

## 2021-11-14 ENCOUNTER — HOSPITAL ENCOUNTER (OUTPATIENT)
Dept: SLEEP MEDICINE | Facility: HOSPITAL | Age: 71
Discharge: HOME | End: 2021-11-14
Admitting: INTERNAL MEDICINE

## 2021-11-14 DIAGNOSIS — I10 ESSENTIAL HYPERTENSION: ICD-10-CM

## 2021-11-14 DIAGNOSIS — G47.30 OBSERVED SLEEP APNEA: ICD-10-CM

## 2021-11-14 DIAGNOSIS — E66.9 CLASS 2 OBESITY: ICD-10-CM

## 2021-11-14 DIAGNOSIS — G47.10 HYPERSOMNIA: ICD-10-CM

## 2021-11-14 DIAGNOSIS — R06.83 SNORING: ICD-10-CM

## 2021-11-14 DIAGNOSIS — R41.3 POOR MEMORY: ICD-10-CM

## 2021-11-14 DIAGNOSIS — G47.8 NON-RESTORATIVE SLEEP: ICD-10-CM

## 2021-11-14 PROCEDURE — 95810 POLYSOM 6/> YRS 4/> PARAM: CPT

## 2021-11-14 PROCEDURE — 95810 POLYSOM 6/> YRS 4/> PARAM: CPT | Performed by: INTERNAL MEDICINE

## 2021-11-22 ENCOUNTER — TELEPHONE (OUTPATIENT)
Dept: SLEEP MEDICINE | Facility: HOSPITAL | Age: 71
End: 2021-11-22

## 2021-11-24 ENCOUNTER — TELEPHONE (OUTPATIENT)
Dept: NEUROLOGY | Facility: CLINIC | Age: 71
End: 2021-11-24

## 2021-11-24 NOTE — TELEPHONE ENCOUNTER
PT IS CANCELING AND NEEDS NEW APPT. CALLED OFFICE AND IT WILL NOT ALLOW ME TO SCHED HOUR APPT . TOLD TO PUT IN NOTE AND THEY WILL CALL HER BACK .

## 2021-12-10 ENCOUNTER — TELEPHONE (OUTPATIENT)
Dept: SLEEP MEDICINE | Facility: HOSPITAL | Age: 71
End: 2021-12-10

## 2021-12-15 ENCOUNTER — TREATMENT (OUTPATIENT)
Dept: PHYSICAL THERAPY | Facility: CLINIC | Age: 71
End: 2021-12-15
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
## 2021-12-15 DIAGNOSIS — M54.50 CHRONIC BILATERAL LOW BACK PAIN WITHOUT SCIATICA: Primary | ICD-10-CM

## 2021-12-15 DIAGNOSIS — G89.29 CHRONIC BILATERAL LOW BACK PAIN WITHOUT SCIATICA: Primary | ICD-10-CM

## 2021-12-15 DIAGNOSIS — Z98.1 HISTORY OF SPINAL FUSION: ICD-10-CM

## 2021-12-15 PROCEDURE — 97162 PT EVAL MOD COMPLEX 30 MIN: CPT | Performed by: PHYSICAL THERAPIST

## 2021-12-15 NOTE — PROGRESS NOTES
Physical Therapy Initial Evaluation and Plan of Care      Patient: Jaylyn Fuentes   : 1950  Diagnosis/ICD-10 Code:  Chronic bilateral low back pain without sciatica [M54.50, G89.29]  Referring practitioner: Roverto Rios MD  Date of Initial Visit: 12/15/2021  Today's Date: 12/15/2021  Patient seen for 1 sessions           Subjective Evaluation    History of Present Illness  Onset date: Chronic   Mechanism of injury: From Epic review, History of spinal fusion T11-L3  and prior fusion to L5.  Myelogram this year shows some loosening of the screws but no further surgery recommended.  She has been referred to aquatic and land therapy.  She is scheduled for left hip revision  Dr. Elizabeth  The hip keeps her from doing things due to pain in the groin   She is no longer able to do yard and house work  She takes pain medication as needed.  It usually helps to stretch out her back. She avoids lifting and using vacuum.   She was hospitalized in October after fall with confusion.   She is a [employer] member and uses recumbent bike 30 min level 3 and elliptical 5 min and aquatic exercise on her own in cooler pool.  PMH:  Migraines, B TKA, B MEENAKSHI, TSA, 2 spinal fusions         Patient Occupation: Retired teacher and   Pain  Current pain ratin (0/10 back)  At best pain ratin  At worst pain rating: 10  Location: Back and left hip    Social Support  Lives in: one-story house (3-4 steps into house )  Lives with: significant other    Patient Goals  Patient goals for therapy: decreased pain and increased strength             Objective          Static Posture     Shoulders  Rounded.    Thoracic Spine  Hyperkyphosis.    Lumbar Spine   Decreased lordosis.     Active Range of Motion     Lumbar   Flexion: Active lumbar flexion: 50%   Extension: Active lumbar extension: Neutral.   Left lateral flexion: Active left lumbar lateral flexion: 20%   Right lateral flexion: Active right lumbar  lateral flexion: 20%   Left rotation: WFL  Right rotation: WFL    Additional Active Range of Motion Details  Back stiffness   Limited hip IR L more than R    Strength/Myotome Testing     Left Hip   Planes of Motion   Flexion: 4 (some pain in lower back)  Abduction: 4+    Right Hip   Planes of Motion   Flexion: 4 (some pain in lower back)  Abduction: 4    Left Knee   Flexion: 5  Extension: 5    Right Knee   Flexion: 5  Extension: 5    Left Ankle/Foot   Dorsiflexion: 5  Plantar flexion: 4    Right Ankle/Foot   Dorsiflexion: 5  Plantar flexion: 4    Additional Strength Details  Transverse abdominus 3/5  Able to bridge hips 3+/5      Tests     Lumbar     Left   Negative passive SLR.     Right   Negative passive SLR.     Lumbar Flexibility Comments:   Moderate tightness in hamstrings and hip rotators     Ambulation   Weight-Bearing Status   Weight-Bearing Status (Left): weight-bearing as tolerated   Weight-Bearing Status (Right): weight-bearing as tolerated    Assistive device used: none    Observational Gait   Gait: antalgic     Functional Assessment     Single Leg Stance   Left single leg stance time: Needs external support   Right single leg stance time: Needs external support         See Exercise, Manual, and Modality Logs for complete treatment.       Functional Outcome Score:ABC Balance Confidence Scale 39% ability or 61% disability         Assessment & Plan     Assessment  Impairments: abnormal gait, abnormal or restricted ROM, activity intolerance, impaired balance, impaired physical strength, lacks appropriate home exercise program and pain with function  Functional Limitations: lifting, walking, uncomfortable because of pain, sitting, standing, stooping and unable to perform repetitive tasks  Assessment details: Jaylyn Fuentes is a 71 y.o. year-old female referred to physical therapy for chronic lower back pain due to history of spinal fusion. This was delayed when she was hospitalized in October.  She  presents with a evolving clinical presentation.  She has comorbidities of recent hospitalization for falls and altered mental status (diagnosed with vascular dementia), scheduled for left hip revision 01/11/22, poor memory so that I have to review her medical history in Epic, and no known personal factors that may affect her progress in the plan of care.  Signs and symptoms are consistent with physical therapy diagnosis of chronic low back pain post spinal fusion that will benefit from HEP. This course of PT expected to be inturrupted or completed prior to L hip surgery.  Prognosis: good    Goals  Plan Goals: STGs to be met by 01/26/22    STG 1 Patient will demonstrate an independent HEP for core muscle strength and ROM/flexibility.    STG 2 Patient will be independent with good back postures and body mechanics to avoid strain to spine with ADLs that she attempts to perform at home     STG 3 Patient will demonstrate an independent aquatic HEP for core strength,  ROM/flexibility, conditioning and balance she can perform at [employer]      LTGs to be achieved by 03/13/22:    LTG 1 Patient will manage her back with ADLs by avoiding pain greater than 7/10    LTG 2 Patient will report no recent falls     LTG3 Patient will report decreased functional disability on ABC Balance Confidence Scale from 61 % to 50 % or more     Plan  Therapy options: will be seen for skilled therapy services  Other planned modality interventions: aquatic therapy  Planned therapy interventions: abdominal trunk stabilization, ADL retraining, neuromuscular re-education, balance/weight-bearing training, body mechanics training, flexibility, functional ROM exercises, gait training, home exercise program, transfer training, therapeutic activities, stretching, strengthening, spinal/joint mobilization and soft tissue mobilization  Frequency: 2x week  Duration in weeks: 12  Treatment plan discussed with: patient  Plan details: Aquatic therapy preferred  in cooler pool to work on gait, balance, core muscle strength with HEP for member   Land PT for same              Timed:  Manual Therapy:    0     mins  39637;  Therapeutic Exercise:    0     mins  35738;     Neuromuscular Claude:    0    mins  31438;    Therapeutic Activity:     0     mins  94409;     Gait Trainin     mins  13187;     Ultrasound:     0     mins  21358;    Iontophoresis    0     mins 90612  Dry Needling   0     mins /  (Self-pay)      Untimed:  Electrical Stimulation:    0     mins  29666 ( );  Traction:  0     mins  15935;   Low Eval     0     Mins  70391  Mod Eval     35     Mins  89531  High Eval                       0     Mins  95329    Timed Treatment:   0   mins   Total Treatment:     35   mins    PT SIGNATURE: Keyanna Broderick, PT     License Number: [id number]    Electronically signed by Keyanna Broderick, PT, 12/15/21, 1:32 PM EST    DATE TREATMENT INITIATED: 12/15/2021    Medicare Initial Certification  Certification Period: 3/15/2022  I certify that the therapy services are furnished while this patient is under my care.  The services outlined above are required by this patient, and will be reviewed every 90 days.     PHYSICIAN: Roverto Rios MD      DATE:     Please sign and return via fax to [phone] Thank you, [facility].

## 2021-12-17 ENCOUNTER — TREATMENT (OUTPATIENT)
Dept: PHYSICAL THERAPY | Facility: CLINIC | Age: 71
End: 2021-12-17

## 2021-12-17 DIAGNOSIS — M54.50 CHRONIC BILATERAL LOW BACK PAIN WITHOUT SCIATICA: Primary | ICD-10-CM
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
## 2021-12-17 DIAGNOSIS — Z98.1 HISTORY OF SPINAL FUSION: ICD-10-CM

## 2021-12-17 DIAGNOSIS — G89.29 CHRONIC BILATERAL LOW BACK PAIN WITHOUT SCIATICA: Primary | ICD-10-CM

## 2021-12-17 PROCEDURE — 97113 AQUATIC THERAPY/EXERCISES: CPT | Performed by: PHYSICAL THERAPIST

## 2021-12-17 NOTE — PROGRESS NOTES
Physical Therapy Daily Progress Note    Patient: Jaylyn Fuentes   : 1950  Diagnosis/ICD-10 Code:  Chronic bilateral low back pain without sciatica [M54.50, G89.29]  Referring practitioner: Yogi Morales MD  Date of Initial Visit: Type: THERAPY  Noted: 12/15/2021  Today's Date: 2021  Patient seen for 2 sessions             Subjective Evaluation    History of Present Illness    Subjective comment: Pain is tolerable, varies in location and intensity.  Guess my goal with therapy is to build strength before my L hip sx (MEENAKSHI revision) in January.       Objective     AQUATIC EX:    Water Walk   Forwards, backwards, and sideways x 1-2 laps ea  Stretch 1   Hip sweeps x 12 w/ noodle under bent knee  Stretch 2   HS w/ SN under ankle 30 sec x 2  Stretch 3   * Next? Piriformis 20 sec x 2, seated  Stretch Other 1  Wall stretch 30 sec x 2  Stretch Other 2  -  Vertical Traction  LN at rail x 1-2 min prn during session  Abdominals   LN x 12  Clams    -  Hip Abd/Add   12x, small ROM  Hip Ext   12x, small ROM  March in Place  15x  Mini Squat   12x  Toe/Heel Raises  15x  Uni-Squat   -  Uni-Clock   -  Step Ups   -  Bicycle   2 min, seated  Flutter/Scissor  15/15, seated  Exercise 1   Alt paddle rows x 10 ea, L3  Exercise 2   -  Exercise 3   -  Exercise 4   -  Exercise 5  -  Exercise 6  -    Assessment & Plan     Assessment    Assessment details: Patient seen today for initial aquatic therapy session including education and instruction in basic aquatic ex/activity for mobility, flexibility, and strength/stabilization.  She admits she is an overachiever and did need education on performing ex/activity in comfortable range and listening to her body.  Periodic decompression breaks taken during session to reduce strain on her lower back.  PT provided demonstration and cuing throughout session for optimal posture, core/glut activation, and correct form/technique with ex/activity.    Plan:  Assess response to initial  aquatic session and modify/progress as appropriate.           Progress per Plan of Care           Timed:  Aquatic Therapy    41     mins 19331;    Merced Rico, PT  Physical Therapist    [place] License: [id number]

## 2022-01-06 ENCOUNTER — APPOINTMENT (OUTPATIENT)
Dept: SLEEP MEDICINE | Facility: HOSPITAL | Age: 72
End: 2022-01-06

## 2022-01-27 ENCOUNTER — TELEPHONE (OUTPATIENT)
Dept: SLEEP MEDICINE | Facility: HOSPITAL | Age: 72
End: 2022-01-27

## 2022-01-27 NOTE — TELEPHONE ENCOUNTER
Al called stating the patient would like to speak with GP before being set up on cpap and has been unable to get ahold of since . They are going to DC the order

## 2022-02-07 ENCOUNTER — TELEPHONE (OUTPATIENT)
Dept: SLEEP MEDICINE | Facility: HOSPITAL | Age: 72
End: 2022-02-07

## 2022-02-07 NOTE — TELEPHONE ENCOUNTER
Pt called about sleep study results.  Faxed orders to [facility] on 2/7/2022.  Pt to call and schedule f/u visit after receiving new machine with .  CV

## 2022-02-10 ENCOUNTER — OFFICE VISIT (OUTPATIENT)
Dept: SLEEP MEDICINE | Facility: HOSPITAL | Age: 72
End: 2022-02-10

## 2022-02-10 ENCOUNTER — TELEPHONE (OUTPATIENT)
Dept: NEUROLOGY | Facility: CLINIC | Age: 72
End: 2022-02-10

## 2022-02-10 VITALS
OXYGEN SATURATION: 96 % | DIASTOLIC BLOOD PRESSURE: 77 MMHG | SYSTOLIC BLOOD PRESSURE: 143 MMHG | HEART RATE: 78 BPM | BODY MASS INDEX: 38.27 KG/M2 | HEIGHT: 63 IN | WEIGHT: 216 LBS

## 2022-02-10 DIAGNOSIS — G47.33 OSA (OBSTRUCTIVE SLEEP APNEA): Primary | ICD-10-CM

## 2022-02-10 DIAGNOSIS — G47.8 NON-RESTORATIVE SLEEP: ICD-10-CM

## 2022-02-10 DIAGNOSIS — E66.9 CLASS 2 OBESITY: ICD-10-CM

## 2022-02-10 DIAGNOSIS — G47.10 HYPERSOMNIA: ICD-10-CM

## 2022-02-10 DIAGNOSIS — R06.83 SNORING: ICD-10-CM

## 2022-02-10 PROCEDURE — G0463 HOSPITAL OUTPT CLINIC VISIT: HCPCS

## 2022-02-10 PROCEDURE — 99213 OFFICE O/P EST LOW 20 MIN: CPT | Performed by: INTERNAL MEDICINE

## 2022-02-10 NOTE — TELEPHONE ENCOUNTER
"**HUB**  I called pt, left vm with new date and time for appt on 3/9 at 10:20 am. If pt calls back and does not work - please transfer to the office for pt to be scheduled on a \"Memory Clinic Thursday\"   Thank you   "

## 2022-02-10 NOTE — PROGRESS NOTES
[facility]  [address]  [place] [address]  Phone   Fax       Jaylyn Fuentes  1950  71 y.o.  female      PCP:Annie Romero MD    Type of service: Initial New Patient Office Visit  Date of service: 2/10/2022    Chief Complaint   Patient presents with   • Sleep Apnea   • Obesity   • Snoring   • Fatigue       History of present illness;  Jaylyn Fuentes 71 y.o.  is a new patient for me and was seen today for management of sleep apnea.  Patient underwent a polysomnography on November 14, 2021 and found to have moderate sleep apnea with AHI of 27.5/h and he has moderate snoring.  Patient is here to discuss the treatment options and also discussed the test results.    Patient gives the following sleep history.  Sleep schedule:  Bedtime: 9 PM  Wake time: 8 AM  Normally takes about 30 minutes to fall asleep  Average hours of sleep 7  Number of naps per day 1  Symptoms  In addition to snoring, nonrestorative sleep and witnessed apneas patient gives the following associated symptoms.  Have you ever awakened gasping for breath, coughing, choking: Yes   Change in weight,  Yes   Morning headaches  Yes   Awaken with a sore throat or dry mouth  Yes   Leg jerking at night:  No   Crawly feeling/urge sensation to move in the legs: No   Teeth grinding:Yes   Have you ever awakened at night with a sour taste or burning sensation in your chest:  No   Do you have muscle weakness with laughing or anger or sleep paralysis:  No   Have you ever felt paralyzed while going to sleep or waking up:  No   Sleepwalking, nightmares, No   Nocturia (urination at night): 4 times per night  Memory Problem:Yes     Past medical history: (Relevant to sleep medicine)  1. Sleep apnea  2. Hypertension  3. Migraine    • Medications are reviewed by me and documented in the encounter  • Allergies reviewed and documented in encounter    Social history:  Do you drive a commercial  "vehicle:  No   Shift work:  No   Tobacco use:  No   Alcohol use:  2 per week  Caffeinated drinks: 1    FAMILY HISTORY (Your mother, father, brothers and sisters)  1. Sleep apnea, brother    REVIEW OF SYSTEMS.  Full review of systems available on the intake form which is scanned in the media tab.  The relevant positive are noted below  1. Daytime excessive sleepiness with [place] Sleepiness Scale :Total score: 6   2. Snoring  3. Frequent urination      Physical exam:  Vitals:    02/10/22 1500   BP: 143/77   Pulse: 78   SpO2: 96%   Weight: 98 kg (216 lb)   Height: 160 cm (62.99\")    Body mass index is 38.27 kg/m². Neck Circumference: 16 inches  HEENT: Head is atraumatic, normocephalic  Eyes: pupils are round equal and reacting to light and accommodation, conjunctiva normal  Nose: no nasal septal defects or deviation and the nasal passages are clear, no nasal polyps,  Throat: tonsils are not enlarged, tongue normal, oral airway Mallampati class 3  NECK:Neck Circumference: 16 inches, trachea is in the midline, thyroid not enlarged  RESPIRATORY SYSTEM: Breath sounds are equal on both sides, there are no wheezes   CARDIOVASULAR SYSTEM: Heart sounds are regular rhythm and ava rate, no edema  EXTREMITES: No cyanosis, clubbing  NEUROLOGICAL SYSTEM: Oriented x 3, no gross motor defects, gait normal       Assessment and plan:  · Obstructive sleep apnea.  We will set up auto CPAP order has been sent to [facility].  Previously order was sent in in November 2021 but she did not get the CPAP as she was in the hospital.  Now she has recovered and she needs a CPAP.  After CPAP is set up she will come back and see me for follow-up for compliance  · Snoring (R06.83) snoring is the sound created by turbulent airflow vibrating upper airway soft tissue.  I have also discussed factors affecting snoring including sleep deprivation, sleeping on the back and alcohol ingestion. To minimize snoring, patient is advised to have adequate sleep, sleep " on the side and avoid alcohol and sedative medications before bedtime  · Daytime excessive sleepiness .  It was assessed with [place] Sleepiness Scale of Total score: 6.  There are many causes for daytime excessive sleepiness including sleep depression, shiftwork syndrome, depression and other medical disorders including heart, kidney and liver failure.  The most serious cause of excessive sleepiness is due to neurological conditions like narcolepsy/cataplexy.  But the most common cause of excessive sleepiness is due to sleep apnea with frequent awakenings during sleep time.  I have discussed safety of driving and to remain vigilant while driving.  · Obesity morbid obesity, with BMI Body mass index is 38.27 kg/m².. I have discussed the relationship between weight and sleep apnea.There is direct correlation between weight and severity of sleep apnea.  Weight reduction is encouraged, as it is going to reduce the severity of sleep apnea. I have also discussed with the patient diet and exercise to achieve ideal body weight,     I have also discussed with the patient the following  • Sleep hygiene: Maintaining a regular bedtime and wake time, not to watch television or work in bed, limit caffeine-containing beverages before bed time and avoid naps during the day  • Adequate amount of sleep.  Generally most people needs about 7 to 8 hours of sleep.  • Return for 31 to 90 days after PAP setup with down load..  Patient's questions were answered.        Murphy Clark MD  Sleep Medicine.  Medical Director, [facility] sleep [facility]  2/10/2022 ,

## 2022-02-10 NOTE — TELEPHONE ENCOUNTER
PT CALLED IN TO GET ADDRESS FOR APPT ADVISED PER APPT NOTE SAW CELESTE IS OUT STILL TRIED TO R/S APPT HOWEVER NOTHING LOADED TO R/S PT     **hosp f/u headache & dementia--pt was seen by Dr. Olson & Haily    PLEASE ADVISE     CALL BACK [phone]  OK TO LEAVE VM DUE TO WILL NOT ANSWER IF ITS AN UNKNOWN NUMBER

## 2022-02-11 ENCOUNTER — TELEPHONE (OUTPATIENT)
Dept: SLEEP MEDICINE | Facility: HOSPITAL | Age: 72
End: 2022-02-11

## 2022-03-09 ENCOUNTER — OFFICE VISIT (OUTPATIENT)
Dept: NEUROLOGY | Facility: CLINIC | Age: 72
End: 2022-03-09

## 2022-03-09 VITALS
BODY MASS INDEX: 38.27 KG/M2 | SYSTOLIC BLOOD PRESSURE: 140 MMHG | DIASTOLIC BLOOD PRESSURE: 70 MMHG | HEIGHT: 63 IN | OXYGEN SATURATION: 95 % | HEART RATE: 77 BPM | WEIGHT: 216 LBS

## 2022-03-09 DIAGNOSIS — R41.3 POOR MEMORY: Primary | ICD-10-CM

## 2022-03-09 DIAGNOSIS — G43.809 OTHER MIGRAINE WITHOUT STATUS MIGRAINOSUS, NOT INTRACTABLE: ICD-10-CM

## 2022-03-09 PROBLEM — F01.518 VASCULAR DEMENTIA WITH BEHAVIOR DISTURBANCE: Status: RESOLVED | Noted: 2021-10-26 | Resolved: 2022-03-09

## 2022-03-09 PROCEDURE — 99215 OFFICE O/P EST HI 40 MIN: CPT | Performed by: PSYCHIATRY & NEUROLOGY

## 2022-03-09 NOTE — PATIENT INSTRUCTIONS
Migraine Headache  A migraine headache is an intense, throbbing pain on one side or both sides of the head. Migraine headaches may also cause other symptoms, such as nausea, vomiting, and sensitivity to light and noise. A migraine headache can last from 4 hours to 3 days. Talk with your doctor about what things may bring on (trigger) your migraine headaches.  What are the causes?  The exact cause of this condition is not known. However, a migraine may be caused when nerves in the brain become irritated and release chemicals that cause inflammation of blood vessels. This inflammation causes pain. This condition may be triggered or caused by:  Drinking alcohol.  Smoking.  Taking medicines, such as:  Medicine used to treat chest pain (nitroglycerin).  Birth control pills.  Estrogen.  Certain blood pressure medicines.  Eating or drinking products that contain nitrates, glutamate, aspartame, or tyramine. Aged cheeses, chocolate, or caffeine may also be triggers.  Doing physical activity.  Other things that may trigger a migraine headache include:  Menstruation.  Pregnancy.  Hunger.  Stress.  Lack of sleep or too much sleep.  Weather changes.  Fatigue.  What increases the risk?  The following factors may make you more likely to experience migraine headaches:  Being a certain age. This condition is more common in people who are 25-55 years old.  Being female.  Having a family history of migraine headaches.  Being .  Having a mental health condition, such as depression or anxiety.  Being obese.  What are the signs or symptoms?  The main symptom of this condition is pulsating or throbbing pain. This pain may:  Happen in any area of the head, such as on one side or both sides.  Interfere with daily activities.  Get worse with physical activity.  Get worse with exposure to bright lights or loud noises.  Other symptoms may include:  Nausea.  Vomiting.  Dizziness.  General sensitivity to bright lights, loud noises, or  smells.  Before you get a migraine headache, you may get warning signs (an aura). An aura may include:  Seeing flashing lights or having blind spots.  Seeing bright spots, halos, or zigzag lines.  Having tunnel vision or blurred vision.  Having numbness or a tingling feeling.  Having trouble talking.  Having muscle weakness.  Some people have symptoms after a migraine headache (postdromal phase), such as:  Feeling tired.  Difficulty concentrating.  How is this diagnosed?  A migraine headache can be diagnosed based on:  Your symptoms.  A physical exam.  Tests, such as:  CT scan or an MRI of the head. These imaging tests can help rule out other causes of headaches.  Taking fluid from the spine (lumbar puncture) and analyzing it (cerebrospinal fluid analysis, or CSF analysis).  How is this treated?  This condition may be treated with medicines that:  Relieve pain.  Relieve nausea.  Prevent migraine headaches.  Treatment for this condition may also include:  Acupuncture.  Lifestyle changes like avoiding foods that trigger migraine headaches.  Biofeedback.  Cognitive behavioral therapy.  Follow these instructions at home:  Medicines  Take over-the-counter and prescription medicines only as told by your health care provider.  Ask your health care provider if the medicine prescribed to you:  Requires you to avoid driving or using heavy machinery.  Can cause constipation. You may need to take these actions to prevent or treat constipation:  Drink enough fluid to keep your urine pale yellow.  Take over-the-counter or prescription medicines.  Eat foods that are high in fiber, such as beans, whole grains, and fresh fruits and vegetables.  Limit foods that are high in fat and processed sugars, such as fried or sweet foods.  Lifestyle  Do not drink alcohol.  Do not use any products that contain nicotine or tobacco, such as cigarettes, e-cigarettes, and chewing tobacco. If you need help quitting, ask your health care  provider.  Get at least 8 hours of sleep every night.  Find ways to manage stress, such as meditation, deep breathing, or yoga.  General instructions               Keep a journal to find out what may trigger your migraine headaches. For example, write down:  What you eat and drink.  How much sleep you get.  Any change to your diet or medicines.  If you have a migraine headache:  Avoid things that make your symptoms worse, such as bright lights.  It may help to lie down in a dark, quiet room.  Do not drive or use heavy machinery.  Ask your health care provider what activities are safe for you while you are experiencing symptoms.  Keep all follow-up visits as told by your health care provider. This is important.  Contact a health care provider if:  You develop symptoms that are different or more severe than your usual migraine headache symptoms.  You have more than 15 headache days in one month.  Get help right away if:  Your migraine headache becomes severe.  Your migraine headache lasts longer than 72 hours.  You have a fever.  You have a stiff neck.  You have vision loss.  Your muscles feel weak or like you cannot control them.  You start to lose your balance often.  You have trouble walking.  You faint.  You have a seizure.  Summary  A migraine headache is an intense, throbbing pain on one side or both sides of the head. Migraines may also cause other symptoms, such as nausea, vomiting, and sensitivity to light and noise.  This condition may be treated with medicines and lifestyle changes. You may also need to avoid certain things that trigger a migraine headache.  Keep a journal to find out what may trigger your migraine headaches.  Contact your health care provider if you have more than 15 headache days in a month or you develop symptoms that are different or more severe than your usual migraine headache symptoms.  This information is not intended to replace advice given to you by your health care provider. Make  sure you discuss any questions you have with your health care provider.  Document Revised: 04/10/2020 Document Reviewed: 01/30/2020  Elsevier Patient Education © 2020 [employer]

## 2022-03-09 NOTE — ASSESSMENT & PLAN NOTE
Doing well currently. Does not want to start a new medicine at this time.  Discussed migraine triggers and lifestyle modifications and provided patient education.

## 2022-03-09 NOTE — PROGRESS NOTES
Chief Complaint  Memory Loss (Reports issues with short term ) and Headache (Has tried zomig,ribflavin and b2 frequency has gone down-) Patient presents today with their partner, Sarah and has given consent to give health information to them.       Subjective          Jaylyn Fuentes presents to [facility] for   HISTORY OF PRESENT ILLNESS:    Jaylyn Fuentes is a 71 year old right handed woman who presents with her partner, Sarah, to neurology clinic for initial evaluation and follow up of hospitalization for confusion and hallucinations and frequent falls that was thought to be possibly related to being overmedicated vs dementia.  On 10/22/2021 Sarah came home and saw patient laying in the middle of the kitchen floor.  She seemed to be confused and out of it and she was taken to the ED due to the confusion.  She was evaluated with a head CT scan and brain MRI scan which I reviewed the images independently on her visit today and agree just demonstrates non specific white matter changes.  She was evaluated by neurology in the hospital who felt that this maybe vascular dementia related.  She was discharged and they tell me she was reevaluated by her PCP and had some of her medications discontinued including Flexeril and they report that she has been doing much better since being discharged from the hospital.  She is no longer hallucinating.  There might be history of dementia in her father in his 80s.  She reports getting migraines in the past which are well controlled at this time with Zomig as needed and riboflavin.  She is doing well with regards to her migraines at this time.  She is going to have a hip replacement next month.  She is able to do her ADLs for herself.  She has not been doing too much socializing due to the COVID19 pandemic. She has been as active as possible.  They have established POA and living will.  She is driving and they tell me she is doing ok without any  tickets or getting lost or accidents.  She is misplacing things at times.      Past Medical History:   Diagnosis Date   • Abdominal pain    • Abnormal CT of the abdomen    • Abnormal glucose    • Abnormal liver enzymes    • Abnormal urinalysis    • Achilles tendinitis     left   • Acid reflux disease    • Acromioclavicular joint arthritis    • Acromioclavicular joint pain, bilateral    • Aftercare following knee joint replacement surgery    • Alcohol abuse    • Alternating constipation and diarrhea    • Anemia    • Arthralgia of multiple sites    • Arthritis    • BMI 34.0-34.9,adult    • Carpal tunnel syndrome    • Cataract    • Cellulitis of extremity    • Chronic insomnia    • Chronic kidney disease, stage III (moderate) ([facility])    • Contact dermatitis    • Contusion of bone    • Cough    • Decreased range of motion    • Degeneration of cervical intervertebral disc    • Depressed    • Dermatitis    • Diarrhea    • Dry eye    • Dysphagia    • Edema of extremities    • Esophagitis    • Fatigue    • Flu vaccine need    • GERD (gastroesophageal reflux disease)    • Glenohumeral arthritis, left    • Hemorrhoids    • Hiatal hernia    • Hip pain    • History of colonic polyps    • History of DVT in adulthood    • History of transfusion    • Hyperactivity of bladder    • Hyperlipidemia    • Hypertension    • Hypokalemia    • Irritable bowel syndrome with diarrhea    • Lactose intolerance    • Left ankle pain    • Leg cramps    • Leg swelling    • Limitation of joint motion of ankle, left    • Lower extremity tendinopathy    • Medicare annual wellness visit, subsequent    • Methicillin resistant Staphylococcus aureus infection    • Microscopic hematuria    • Migraine    • Obstructive sleep apnea    • Osteoarthritis, chronic    • Pain, foot, left, chronic    • Pancreatitis, chronic ([facility])    • Pes planus    • PONV (postoperative nausea and vomiting)     SCOPOLAMINE HELPS-USED DURING LAST SURGERY    • Rectal leakage    •  "Retrocalcaneal bursitis    • Right ankle instability    • Right ankle pain    • Right ankle sprain    • Right ankle swelling    • Rotator cuff tear, left    • S/P shoulder surgery    • Shoulder region pain    • Spinal stenosis    • Swallowing difficulty    • Urge incontinence of urine    • Urinary incontinence    • Visit for screening mammogram    • Wears contact lenses    • Wears dentures     UPPER AND LOWER    • Wears hearing aid     DOESN'T HAVE WITH HER   • Weight gain         Family History   Problem Relation Age of Onset   • Arthritis Other    • Colonic polyp Other    • Coronary artery disease Other         Social History     Socioeconomic History   • Marital status: Single   Tobacco Use   • Smoking status: Former Smoker   • Smokeless tobacco: Never Used   Vaping Use   • Vaping Use: Never used   Substance and Sexual Activity   • Alcohol use: Yes     Comment: WINE [employer] BE[employer] SOCIALLY    • Drug use: No   • Sexual activity: Defer        I have reviewed and confirmed the accuracy of the ROS as documented by the MA/LPN/RN Trevor Mast MD    Review of Systems   Constitutional: Negative for activity change, appetite change and fatigue.   HENT: Negative for trouble swallowing and voice change.    Eyes: Negative for blurred vision, double vision and pain.   Gastrointestinal: Negative for nausea and vomiting.   Musculoskeletal: Positive for back pain and gait problem.   Neurological: Positive for headache and memory problem. Negative for dizziness, tremors, seizures, syncope, facial asymmetry, speech difficulty, weakness, light-headedness, numbness and confusion.   Psychiatric/Behavioral: Positive for decreased concentration. Negative for agitation, behavioral problems, dysphoric mood, hallucinations, self-injury, sleep disturbance, suicidal ideas, negative for hyperactivity, depressed mood and stress. The patient is not nervous/anxious.         Objective   Vital Signs:   /70   Pulse 77   Ht 160 cm (62.99\")   " Wt 98 kg (216 lb)   SpO2 95%   BMI 38.27 kg/m²       PHYSICAL EXAM:    General   Mental Status - Alert. General Appearance - Well developed, Well groomed, Oriented and Cooperative. Orientation - Oriented X3.       Head and Neck  Head - normocephalic, atraumatic with no lesions or palpable masses.  Neck    Global Assessment - supple.       Eye   Sclera/Conjunctiva - Bilateral - Normal.    ENMT  Mouth and Throat   Oral Cavity/Oropharynx: Oropharynx - the soft palate,uvula and tongue are normal in appearance.    Chest and Lung Exam   Chest - lung clear to auscultation bilaterally.    Cardiovascular   Cardiovascular examination reveals  - normal heart sounds, regular rate and rhythm.    Neurologic   Mental Status: Speech - Normal. Cognitive function - SLUMS 27/30, appropriate fund of knowledge. No impairment of attention, Impairment of concentration, impairment of long term memory or impairment of short term memory.  Cranial Nerves:   II Optic: Visual acuity - Left - Normal. Right - Normal. Visual fields - Normal (to confrontation).  III Oculomotor: Pupillary constriction - Left - Normal. Right - Normal.  VII Facial: - Normal Bilaterally.   IX Glossopharyngeal / X Vagus - Normal.  XI Accessory: Trapezius - Bilateral - Normal. Sternocleidomastoid - Bilateral - Normal.  XII Hypoglossal - Bilateral - Normal.  Eye Movements: - Normal Bilaterally.  Sensory:   Light Touch: Intact - Globally.  Motor:   Bulk and Contour: - Normal.  Tone: - Normal.  Tremor: Not present.  Strength: 5/5 normal muscle strength - All Muscles.   General Assessment of Reflexes: - deep tendon reflexes are normal. Coordination - No Impairment of finger-to-nose or Impairment of rapid alternating movements. Gait - Broad based and uses cane.        Result Review :                 Assessment and Plan    Problem List Items Addressed This Visit        Neuro    Poor memory - Primary    Current Assessment & Plan     SLUMS of 27/30 today is not consistent with  dementia but they do report some trouble with her memory and given her baseline cognitive reserve this score may not truly reflect her cognitive state.  I informed patient and partner with regards to this diagnosis.  I will refer patient to have formal neuropsychological testing performed for further evaluation.  She was evaluated with a head CT scan and brain MRI scan which I reviewed the images independently on her visit today and agree just demonstrates non specific white matter changes.  She was evaluated by neurology in the hospital who felt that this maybe vascular dementia related.  She was discharged and they tell me she was reevaluated by her PCP and had some of her medications discontinued including Flexeril and they report that she has been doing much better since being discharged from the hospital.  I discussed safety issues in memory impairment including not driving if she is with her partner and not cooking which she tells me she does not cook.  Discussed diagnosis extensively with patient and partner and advised patient to exercise and socialize which are the only two things that have been shown to help potentially slow down progression of disease.  POA and Living will were discussed which patient has already done.  Will follow up in 1 year to reevaluate and sooner if needed.             Relevant Orders    Ambulatory Referral to Neuropsychology    Migraine headache    Current Assessment & Plan     Doing well currently. Does not want to start a new medicine at this time.  Discussed migraine triggers and lifestyle modifications and provided patient education.                     I spent 41 minutes caring for Jaylyn on this date of service. This time includes time spent by me in the following activities:preparing for the visit, reviewing tests, obtaining and/or reviewing a separately obtained history, performing a medically appropriate examination and/or evaluation , counseling and educating the  patient/family/caregiver, ordering medications, tests, or procedures, documenting information in the medical record, independently interpreting results and communicating that information with the patient/family/caregiver and care coordination    Follow Up   Return in about 1 year (around 3/9/2023).  Patient was given instructions and counseling regarding her condition or for health maintenance advice. Please see specific information pulled into the AVS if appropriate.

## 2022-03-09 NOTE — ASSESSMENT & PLAN NOTE
SLUMS of 27/30 today is not consistent with dementia but they do report some trouble with her memory and given her baseline cognitive reserve this score may not truly reflect her cognitive state.  I informed patient and partner with regards to this diagnosis.  I will refer patient to have formal neuropsychological testing performed for further evaluation.  She was evaluated with a head CT scan and brain MRI scan which I reviewed the images independently on her visit today and agree just demonstrates non specific white matter changes.  She was evaluated by neurology in the hospital who felt that this maybe vascular dementia related.  She was discharged and they tell me she was reevaluated by her PCP and had some of her medications discontinued including Flexeril and they report that she has been doing much better since being discharged from the hospital.  I discussed safety issues in memory impairment including not driving if she is with her partner and not cooking which she tells me she does not cook.  Discussed diagnosis extensively with patient and partner and advised patient to exercise and socialize which are the only two things that have been shown to help potentially slow down progression of disease.  POA and Living will were discussed which patient has already done.  Will follow up in 1 year to reevaluate and sooner if needed.

## 2022-04-07 PROCEDURE — 99285 EMERGENCY DEPT VISIT HI MDM: CPT

## 2022-04-08 ENCOUNTER — HOSPITAL ENCOUNTER (OUTPATIENT)
Facility: HOSPITAL | Age: 72
Setting detail: OBSERVATION
Discharge: HOME | End: 2022-04-11
Attending: EMERGENCY MEDICINE | Admitting: HOSPITALIST

## 2022-04-08 ENCOUNTER — APPOINTMENT (OUTPATIENT)
Dept: GENERAL RADIOLOGY | Facility: HOSPITAL | Age: 72
End: 2022-04-08

## 2022-04-08 ENCOUNTER — APPOINTMENT (OUTPATIENT)
Dept: CT IMAGING | Facility: HOSPITAL | Age: 72
End: 2022-04-08

## 2022-04-08 DIAGNOSIS — I10 PRIMARY HYPERTENSION: ICD-10-CM

## 2022-04-08 DIAGNOSIS — Z96.649 HISTORY OF REVISION OF TOTAL HIP ARTHROPLASTY: ICD-10-CM

## 2022-04-08 DIAGNOSIS — E78.5 HYPERLIPIDEMIA, UNSPECIFIED HYPERLIPIDEMIA TYPE: ICD-10-CM

## 2022-04-08 DIAGNOSIS — K21.9 GASTROESOPHAGEAL REFLUX DISEASE, UNSPECIFIED WHETHER ESOPHAGITIS PRESENT: ICD-10-CM

## 2022-04-08 DIAGNOSIS — R05.9 COUGH: ICD-10-CM

## 2022-04-08 DIAGNOSIS — R41.82 ALTERED MENTAL STATUS, UNSPECIFIED ALTERED MENTAL STATUS TYPE: Primary | ICD-10-CM

## 2022-04-08 PROBLEM — G92.9 ENCEPHALOPATHY, TOXIC: Status: ACTIVE | Noted: 2022-04-08

## 2022-04-08 PROBLEM — E86.0 DEHYDRATION: Status: ACTIVE | Noted: 2022-04-08

## 2022-04-08 PROBLEM — Z79.899 POLYPHARMACY: Status: ACTIVE | Noted: 2022-04-08

## 2022-04-08 PROBLEM — R32 URINARY INCONTINENCE: Status: ACTIVE | Noted: 2022-04-08

## 2022-04-08 PROBLEM — Z79.891 CHRONIC PRESCRIPTION OPIATE USE: Status: ACTIVE | Noted: 2022-04-08

## 2022-04-08 LAB
ALBUMIN SERPL-MCNC: 3.9 G/DL (ref 3.5–5.2)
ALBUMIN/GLOB SERPL: 1.3 G/DL
ALP SERPL-CCNC: 79 U/L (ref 39–117)
ALT SERPL W P-5'-P-CCNC: 11 U/L (ref 1–33)
AMPHET+METHAMPHET UR QL: NEGATIVE
ANION GAP SERPL CALCULATED.3IONS-SCNC: 11.7 MMOL/L (ref 5–15)
AST SERPL-CCNC: 33 U/L (ref 1–32)
B PARAPERT DNA SPEC QL NAA+PROBE: NOT DETECTED
B PERT DNA SPEC QL NAA+PROBE: NOT DETECTED
BACTERIA UR QL AUTO: ABNORMAL /HPF
BARBITURATES UR QL SCN: POSITIVE
BASOPHILS # BLD AUTO: 0.03 10*3/MM3 (ref 0–0.2)
BASOPHILS NFR BLD AUTO: 0.3 % (ref 0–1.5)
BENZODIAZ UR QL SCN: NEGATIVE
BILIRUB SERPL-MCNC: 0.8 MG/DL (ref 0–1.2)
BILIRUB UR QL STRIP: NEGATIVE
BUN SERPL-MCNC: 10 MG/DL (ref 8–23)
BUN/CREAT SERPL: 13.3 (ref 7–25)
C PNEUM DNA NPH QL NAA+NON-PROBE: NOT DETECTED
CALCIUM SPEC-SCNC: 9 MG/DL (ref 8.6–10.5)
CANNABINOIDS SERPL QL: NEGATIVE
CHLORIDE SERPL-SCNC: 102 MMOL/L (ref 98–107)
CLARITY UR: CLEAR
CO2 SERPL-SCNC: 26.3 MMOL/L (ref 22–29)
COCAINE UR QL: NEGATIVE
COLOR UR: YELLOW
CREAT SERPL-MCNC: 0.75 MG/DL (ref 0.57–1)
D-LACTATE SERPL-SCNC: 1.3 MMOL/L (ref 0.5–2)
DEPRECATED RDW RBC AUTO: 44.3 FL (ref 37–54)
EGFRCR SERPLBLD CKD-EPI 2021: 84.2 ML/MIN/1.73
EOSINOPHIL # BLD AUTO: 0.1 10*3/MM3 (ref 0–0.4)
EOSINOPHIL NFR BLD AUTO: 1 % (ref 0.3–6.2)
ERYTHROCYTE [DISTWIDTH] IN BLOOD BY AUTOMATED COUNT: 13.6 % (ref 12.3–15.4)
FLUAV SUBTYP SPEC NAA+PROBE: NOT DETECTED
FLUBV RNA ISLT QL NAA+PROBE: NOT DETECTED
GLOBULIN UR ELPH-MCNC: 3.1 GM/DL
GLUCOSE SERPL-MCNC: 120 MG/DL (ref 65–99)
GLUCOSE UR STRIP-MCNC: NEGATIVE MG/DL
HADV DNA SPEC NAA+PROBE: NOT DETECTED
HCOV 229E RNA SPEC QL NAA+PROBE: NOT DETECTED
HCOV HKU1 RNA SPEC QL NAA+PROBE: NOT DETECTED
HCOV NL63 RNA SPEC QL NAA+PROBE: NOT DETECTED
HCOV OC43 RNA SPEC QL NAA+PROBE: NOT DETECTED
HCT VFR BLD AUTO: 33.4 % (ref 34–46.6)
HGB BLD-MCNC: 11.6 G/DL (ref 12–15.9)
HGB UR QL STRIP.AUTO: ABNORMAL
HMPV RNA NPH QL NAA+NON-PROBE: NOT DETECTED
HPIV1 RNA ISLT QL NAA+PROBE: NOT DETECTED
HPIV2 RNA SPEC QL NAA+PROBE: NOT DETECTED
HPIV3 RNA NPH QL NAA+PROBE: NOT DETECTED
HPIV4 P GENE NPH QL NAA+PROBE: NOT DETECTED
HYALINE CASTS UR QL AUTO: ABNORMAL /LPF
IMM GRANULOCYTES # BLD AUTO: 0.05 10*3/MM3 (ref 0–0.05)
IMM GRANULOCYTES NFR BLD AUTO: 0.5 % (ref 0–0.5)
INR PPP: 1.07 (ref 0.9–1.1)
KETONES UR QL STRIP: ABNORMAL
LEUKOCYTE ESTERASE UR QL STRIP.AUTO: NEGATIVE
LYMPHOCYTES # BLD AUTO: 1.08 10*3/MM3 (ref 0.7–3.1)
LYMPHOCYTES NFR BLD AUTO: 10.7 % (ref 19.6–45.3)
M PNEUMO IGG SER IA-ACNC: NOT DETECTED
MCH RBC QN AUTO: 30.8 PG (ref 26.6–33)
MCHC RBC AUTO-ENTMCNC: 34.7 G/DL (ref 31.5–35.7)
MCV RBC AUTO: 88.6 FL (ref 79–97)
METHADONE UR QL SCN: NEGATIVE
MONOCYTES # BLD AUTO: 0.83 10*3/MM3 (ref 0.1–0.9)
MONOCYTES NFR BLD AUTO: 8.2 % (ref 5–12)
NEUTROPHILS NFR BLD AUTO: 79.3 % (ref 42.7–76)
NEUTROPHILS NFR BLD AUTO: 8 10*3/MM3 (ref 1.7–7)
NITRITE UR QL STRIP: NEGATIVE
NRBC BLD AUTO-RTO: 0 /100 WBC (ref 0–0.2)
NT-PROBNP SERPL-MCNC: 527 PG/ML (ref 0–900)
OPIATES UR QL: NEGATIVE
OXYCODONE UR QL SCN: POSITIVE
PH UR STRIP.AUTO: 7.5 [PH] (ref 5–8)
PLATELET # BLD AUTO: 228 10*3/MM3 (ref 140–450)
PMV BLD AUTO: 9.1 FL (ref 6–12)
POTASSIUM SERPL-SCNC: 3.7 MMOL/L (ref 3.5–5.2)
PROCALCITONIN SERPL-MCNC: 0.03 NG/ML (ref 0–0.25)
PROT SERPL-MCNC: 7 G/DL (ref 6–8.5)
PROT UR QL STRIP: NEGATIVE
PROTHROMBIN TIME: 13.8 SECONDS (ref 11.7–14.2)
QT INTERVAL: 382 MS
RBC # BLD AUTO: 3.77 10*6/MM3 (ref 3.77–5.28)
RBC # UR STRIP: ABNORMAL /HPF
REF LAB TEST METHOD: ABNORMAL
RHINOVIRUS RNA SPEC NAA+PROBE: NOT DETECTED
RSV RNA NPH QL NAA+NON-PROBE: NOT DETECTED
SARS-COV-2 RNA NPH QL NAA+NON-PROBE: NOT DETECTED
SODIUM SERPL-SCNC: 140 MMOL/L (ref 136–145)
SP GR UR STRIP: >=1.03 (ref 1–1.03)
SQUAMOUS #/AREA URNS HPF: ABNORMAL /HPF
TROPONIN T SERPL-MCNC: <0.01 NG/ML (ref 0–0.03)
TSH SERPL DL<=0.05 MIU/L-ACNC: 1.15 UIU/ML (ref 0.27–4.2)
UROBILINOGEN UR QL STRIP: ABNORMAL
WBC # UR STRIP: ABNORMAL /HPF
WBC NRBC COR # BLD: 10.09 10*3/MM3 (ref 3.4–10.8)

## 2022-04-08 PROCEDURE — 93010 ELECTROCARDIOGRAM REPORT: CPT | Performed by: INTERNAL MEDICINE

## 2022-04-08 PROCEDURE — 80307 DRUG TEST PRSMV CHEM ANLYZR: CPT | Performed by: STUDENT IN AN ORGANIZED HEALTH CARE EDUCATION/TRAINING PROGRAM

## 2022-04-08 PROCEDURE — 96376 TX/PRO/DX INJ SAME DRUG ADON: CPT

## 2022-04-08 PROCEDURE — G0378 HOSPITAL OBSERVATION PER HR: HCPCS

## 2022-04-08 PROCEDURE — 92610 EVALUATE SWALLOWING FUNCTION: CPT | Performed by: SPEECH-LANGUAGE PATHOLOGIST

## 2022-04-08 PROCEDURE — 96375 TX/PRO/DX INJ NEW DRUG ADDON: CPT

## 2022-04-08 PROCEDURE — 0202U NFCT DS 22 TRGT SARS-COV-2: CPT | Performed by: PHYSICIAN ASSISTANT

## 2022-04-08 PROCEDURE — P9612 CATHETERIZE FOR URINE SPEC: HCPCS

## 2022-04-08 PROCEDURE — 71045 X-RAY EXAM CHEST 1 VIEW: CPT

## 2022-04-08 PROCEDURE — 25010000002 ENOXAPARIN PER 10 MG: Performed by: STUDENT IN AN ORGANIZED HEALTH CARE EDUCATION/TRAINING PROGRAM

## 2022-04-08 PROCEDURE — 0 IOPAMIDOL PER 1 ML: Performed by: EMERGENCY MEDICINE

## 2022-04-08 PROCEDURE — 81001 URINALYSIS AUTO W/SCOPE: CPT | Performed by: PHYSICIAN ASSISTANT

## 2022-04-08 PROCEDURE — 96361 HYDRATE IV INFUSION ADD-ON: CPT

## 2022-04-08 PROCEDURE — 93005 ELECTROCARDIOGRAM TRACING: CPT | Performed by: PHYSICIAN ASSISTANT

## 2022-04-08 PROCEDURE — 25010000002 METOCLOPRAMIDE PER 10 MG: Performed by: PHYSICIAN ASSISTANT

## 2022-04-08 PROCEDURE — 25010000002 ONDANSETRON PER 1 MG: Performed by: PHYSICIAN ASSISTANT

## 2022-04-08 PROCEDURE — 85025 COMPLETE CBC W/AUTO DIFF WBC: CPT | Performed by: PHYSICIAN ASSISTANT

## 2022-04-08 PROCEDURE — 80053 COMPREHEN METABOLIC PANEL: CPT | Performed by: PHYSICIAN ASSISTANT

## 2022-04-08 PROCEDURE — 97166 OT EVAL MOD COMPLEX 45 MIN: CPT

## 2022-04-08 PROCEDURE — 83880 ASSAY OF NATRIURETIC PEPTIDE: CPT | Performed by: PHYSICIAN ASSISTANT

## 2022-04-08 PROCEDURE — 87040 BLOOD CULTURE FOR BACTERIA: CPT | Performed by: PHYSICIAN ASSISTANT

## 2022-04-08 PROCEDURE — 71275 CT ANGIOGRAPHY CHEST: CPT

## 2022-04-08 PROCEDURE — 84443 ASSAY THYROID STIM HORMONE: CPT | Performed by: STUDENT IN AN ORGANIZED HEALTH CARE EDUCATION/TRAINING PROGRAM

## 2022-04-08 PROCEDURE — 70450 CT HEAD/BRAIN W/O DYE: CPT

## 2022-04-08 PROCEDURE — 84145 PROCALCITONIN (PCT): CPT | Performed by: PHYSICIAN ASSISTANT

## 2022-04-08 PROCEDURE — 85610 PROTHROMBIN TIME: CPT | Performed by: PHYSICIAN ASSISTANT

## 2022-04-08 PROCEDURE — 83605 ASSAY OF LACTIC ACID: CPT | Performed by: PHYSICIAN ASSISTANT

## 2022-04-08 PROCEDURE — 36415 COLL VENOUS BLD VENIPUNCTURE: CPT

## 2022-04-08 PROCEDURE — 97530 THERAPEUTIC ACTIVITIES: CPT

## 2022-04-08 PROCEDURE — 84484 ASSAY OF TROPONIN QUANT: CPT | Performed by: PHYSICIAN ASSISTANT

## 2022-04-08 PROCEDURE — 96372 THER/PROPH/DIAG INJ SC/IM: CPT

## 2022-04-08 RX ORDER — HYDROCODONE BITARTRATE AND ACETAMINOPHEN 5; 325 MG/1; MG/1
1 TABLET ORAL EVERY 4 HOURS PRN
Status: DISCONTINUED | OUTPATIENT
Start: 2022-04-08 | End: 2022-04-11 | Stop reason: HOSPADM

## 2022-04-08 RX ORDER — SUMATRIPTAN 50 MG/1
50 TABLET, FILM COATED ORAL ONCE
Status: COMPLETED | OUTPATIENT
Start: 2022-04-08 | End: 2022-04-08

## 2022-04-08 RX ORDER — LISINOPRIL 20 MG/1
20 TABLET ORAL 2 TIMES DAILY
Status: DISCONTINUED | OUTPATIENT
Start: 2022-04-08 | End: 2022-04-11 | Stop reason: HOSPADM

## 2022-04-08 RX ORDER — SODIUM CHLORIDE 0.9 % (FLUSH) 0.9 %
10 SYRINGE (ML) INJECTION AS NEEDED
Status: DISCONTINUED | OUTPATIENT
Start: 2022-04-08 | End: 2022-04-11 | Stop reason: HOSPADM

## 2022-04-08 RX ORDER — ONDANSETRON 2 MG/ML
4 INJECTION INTRAMUSCULAR; INTRAVENOUS ONCE
Status: COMPLETED | OUTPATIENT
Start: 2022-04-08 | End: 2022-04-08

## 2022-04-08 RX ORDER — PANTOPRAZOLE SODIUM 40 MG/1
40 TABLET, DELAYED RELEASE ORAL
Status: DISCONTINUED | OUTPATIENT
Start: 2022-04-08 | End: 2022-04-11 | Stop reason: HOSPADM

## 2022-04-08 RX ORDER — CHOLECALCIFEROL (VITAMIN D3) 125 MCG
5 CAPSULE ORAL NIGHTLY PRN
Status: DISCONTINUED | OUTPATIENT
Start: 2022-04-08 | End: 2022-04-08

## 2022-04-08 RX ORDER — ACETAMINOPHEN 325 MG/1
650 TABLET ORAL EVERY 4 HOURS PRN
Status: DISCONTINUED | OUTPATIENT
Start: 2022-04-08 | End: 2022-04-11 | Stop reason: HOSPADM

## 2022-04-08 RX ORDER — PREGABALIN 150 MG/1
300 CAPSULE ORAL EVERY EVENING
COMMUNITY

## 2022-04-08 RX ORDER — ROSUVASTATIN CALCIUM 10 MG/1
10 TABLET, COATED ORAL DAILY
Status: ON HOLD | COMMUNITY
Start: 2022-02-09 | End: 2022-10-06

## 2022-04-08 RX ORDER — PREGABALIN 75 MG/1
150 CAPSULE ORAL EVERY MORNING
Status: DISCONTINUED | OUTPATIENT
Start: 2022-04-09 | End: 2022-04-11 | Stop reason: HOSPADM

## 2022-04-08 RX ORDER — ROSUVASTATIN CALCIUM 10 MG/1
10 TABLET, COATED ORAL DAILY
Status: DISCONTINUED | OUTPATIENT
Start: 2022-04-08 | End: 2022-04-11 | Stop reason: HOSPADM

## 2022-04-08 RX ORDER — VILAZODONE HYDROCHLORIDE 40 MG/1
40 TABLET ORAL DAILY
Status: DISCONTINUED | OUTPATIENT
Start: 2022-04-08 | End: 2022-04-11 | Stop reason: HOSPADM

## 2022-04-08 RX ORDER — ONDANSETRON 2 MG/ML
4 INJECTION INTRAMUSCULAR; INTRAVENOUS EVERY 6 HOURS PRN
Status: DISCONTINUED | OUTPATIENT
Start: 2022-04-08 | End: 2022-04-11 | Stop reason: HOSPADM

## 2022-04-08 RX ORDER — OXYBUTYNIN CHLORIDE 10 MG/1
10 TABLET, EXTENDED RELEASE ORAL DAILY
Status: DISCONTINUED | OUTPATIENT
Start: 2022-04-08 | End: 2022-04-11 | Stop reason: HOSPADM

## 2022-04-08 RX ORDER — OLANZAPINE 2.5 MG/1
2.5 TABLET ORAL NIGHTLY
Status: DISCONTINUED | OUTPATIENT
Start: 2022-04-08 | End: 2022-04-11 | Stop reason: HOSPADM

## 2022-04-08 RX ORDER — TOLTERODINE TARTRATE 1 MG/1
1 TABLET, EXTENDED RELEASE ORAL 2 TIMES DAILY
Status: ON HOLD | COMMUNITY
Start: 2022-02-12 | End: 2022-10-06

## 2022-04-08 RX ORDER — CHOLECALCIFEROL (VITAMIN D3) 125 MCG
5 CAPSULE ORAL NIGHTLY
Status: DISCONTINUED | OUTPATIENT
Start: 2022-04-08 | End: 2022-04-11 | Stop reason: HOSPADM

## 2022-04-08 RX ORDER — DIPHENOXYLATE HYDROCHLORIDE AND ATROPINE SULFATE 2.5; .025 MG/1; MG/1
1 TABLET ORAL 4 TIMES DAILY PRN
Status: DISCONTINUED | OUTPATIENT
Start: 2022-04-08 | End: 2022-04-11 | Stop reason: HOSPADM

## 2022-04-08 RX ORDER — ALBUTEROL SULFATE 90 UG/1
2 AEROSOL, METERED RESPIRATORY (INHALATION) EVERY 4 HOURS PRN
Status: DISCONTINUED | OUTPATIENT
Start: 2022-04-08 | End: 2022-04-11 | Stop reason: HOSPADM

## 2022-04-08 RX ORDER — ALUMINA, MAGNESIA, AND SIMETHICONE 2400; 2400; 240 MG/30ML; MG/30ML; MG/30ML
15 SUSPENSION ORAL EVERY 6 HOURS PRN
Status: DISCONTINUED | OUTPATIENT
Start: 2022-04-08 | End: 2022-04-11 | Stop reason: HOSPADM

## 2022-04-08 RX ORDER — PREGABALIN 75 MG/1
150 CAPSULE ORAL EVERY EVENING
Status: DISCONTINUED | OUTPATIENT
Start: 2022-04-08 | End: 2022-04-11 | Stop reason: HOSPADM

## 2022-04-08 RX ORDER — ONDANSETRON 4 MG/1
4 TABLET, FILM COATED ORAL EVERY 6 HOURS PRN
Status: DISCONTINUED | OUTPATIENT
Start: 2022-04-08 | End: 2022-04-11 | Stop reason: HOSPADM

## 2022-04-08 RX ORDER — SODIUM CHLORIDE 9 MG/ML
100 INJECTION, SOLUTION INTRAVENOUS CONTINUOUS
Status: DISCONTINUED | OUTPATIENT
Start: 2022-04-08 | End: 2022-04-09

## 2022-04-08 RX ORDER — NITROGLYCERIN 0.4 MG/1
0.4 TABLET SUBLINGUAL
Status: DISCONTINUED | OUTPATIENT
Start: 2022-04-08 | End: 2022-04-11 | Stop reason: HOSPADM

## 2022-04-08 RX ORDER — OLANZAPINE 2.5 MG/1
2.5 TABLET ORAL NIGHTLY
COMMUNITY

## 2022-04-08 RX ORDER — SUMATRIPTAN 5 MG/1
5 SPRAY NASAL
Status: ON HOLD | COMMUNITY
Start: 2022-04-05 | End: 2022-10-06

## 2022-04-08 RX ORDER — METOCLOPRAMIDE HYDROCHLORIDE 5 MG/ML
5 INJECTION INTRAMUSCULAR; INTRAVENOUS ONCE
Status: COMPLETED | OUTPATIENT
Start: 2022-04-08 | End: 2022-04-08

## 2022-04-08 RX ORDER — CETIRIZINE HYDROCHLORIDE 10 MG/1
10 TABLET ORAL DAILY
Status: DISCONTINUED | OUTPATIENT
Start: 2022-04-08 | End: 2022-04-11 | Stop reason: HOSPADM

## 2022-04-08 RX ADMIN — ONDANSETRON 4 MG: 2 INJECTION INTRAMUSCULAR; INTRAVENOUS at 02:05

## 2022-04-08 RX ADMIN — LISINOPRIL 20 MG: 20 TABLET ORAL at 13:35

## 2022-04-08 RX ADMIN — PREGABALIN 150 MG: 75 CAPSULE ORAL at 17:17

## 2022-04-08 RX ADMIN — VILAZODONE HYDROCHLORIDE 40 MG: 40 TABLET ORAL at 13:35

## 2022-04-08 RX ADMIN — ENOXAPARIN SODIUM 40 MG: 100 INJECTION SUBCUTANEOUS at 20:46

## 2022-04-08 RX ADMIN — IOPAMIDOL 85 ML: 755 INJECTION, SOLUTION INTRAVENOUS at 04:42

## 2022-04-08 RX ADMIN — SODIUM CHLORIDE 100 ML/HR: 9 INJECTION, SOLUTION INTRAVENOUS at 13:34

## 2022-04-08 RX ADMIN — Medication 10 ML: at 03:18

## 2022-04-08 RX ADMIN — SUMATRIPTAN SUCCINATE 50 MG: 50 TABLET ORAL at 23:25

## 2022-04-08 RX ADMIN — OXYBUTYNIN CHLORIDE 10 MG: 10 TABLET, EXTENDED RELEASE ORAL at 13:35

## 2022-04-08 RX ADMIN — ONDANSETRON 4 MG: 2 INJECTION INTRAMUSCULAR; INTRAVENOUS at 03:17

## 2022-04-08 RX ADMIN — LISINOPRIL 20 MG: 20 TABLET ORAL at 20:46

## 2022-04-08 RX ADMIN — PANTOPRAZOLE SODIUM 40 MG: 40 TABLET, DELAYED RELEASE ORAL at 17:17

## 2022-04-08 RX ADMIN — SODIUM CHLORIDE 500 ML: 9 INJECTION, SOLUTION INTRAVENOUS at 02:06

## 2022-04-08 RX ADMIN — METOCLOPRAMIDE HYDROCHLORIDE 5 MG: 5 INJECTION INTRAMUSCULAR; INTRAVENOUS at 04:52

## 2022-04-08 RX ADMIN — OLANZAPINE 2.5 MG: 2.5 TABLET ORAL at 20:46

## 2022-04-08 NOTE — ED NOTES
Incontinence care completed. Provider notified. Pt repositioned for comfort and given warm blankets. Pure wick applied. Pt still c/o and coughing/gagging, but no emesis.

## 2022-04-08 NOTE — PLAN OF CARE
Goal Outcome Evaluation:    Progress: no change  Outcome Evaluation: Patient up to [facility] this afternoon. Vitals stable. Alert and oriented to self. On room air. Patient resting well in bed. Purewick in place. Left hip dressing clean, dry, and intact - WOCN changed dressing earlier today. NS @ 100 mL/hr. Patient's partner supportive of patient. Patient stable and needs met at this time.

## 2022-04-08 NOTE — THERAPY EVALUATION
Patient Name: Jaylyn Fuentes  : 1950    MRN: [id number]                              Today's Date: 2022       Admit Date: 2022    Visit Dx:     ICD-10-CM ICD-9-CM   1. Altered mental status, unspecified altered mental status type  R41.82 780.97   2. Cough  R05.9 786.2   3. Primary hypertension  I10 401.9   4. Hyperlipidemia, unspecified hyperlipidemia type  E78.5 272.4   5. Gastroesophageal reflux disease, unspecified whether esophagitis present  K21.9 530.81   6. History of revision of total hip arthroplasty  Z96.649 V43.64     Patient Active Problem List   Diagnosis   • Osteoarthrosis, localized, primary, shoulder region   • Hypertension   • Depressed   • Status post total replacement of left shoulder   • Duodenitis   • Abnormal CT scan, small bowel   • Nausea and vomiting   • SIN (obstructive sleep apnea)   • Snoring   • Hypersomnia   • Non-restorative sleep   • Obesity (BMI 30-39.9)   • Poor memory   • Frequent falls   • Migraine headache   • Altered mental status   • Dehydration   • Encephalopathy, toxic   • Polypharmacy   • Chronic prescription opiate use   • Urinary incontinence     Past Medical History:   Diagnosis Date   • Abdominal pain    • Abnormal CT of the abdomen    • Abnormal glucose    • Abnormal liver enzymes    • Abnormal urinalysis    • Achilles tendinitis     left   • Acid reflux disease    • Acromioclavicular joint arthritis    • Acromioclavicular joint pain, bilateral    • Aftercare following knee joint replacement surgery    • Alcohol abuse    • Alternating constipation and diarrhea    • Anemia    • Arthralgia of multiple sites    • Arthritis    • BMI 34.0-34.9,adult    • Carpal tunnel syndrome    • Cataract    • Cellulitis of extremity    • Chronic insomnia    • Chronic kidney disease, stage III (moderate) ([facility])    • Contact dermatitis    • Contusion of bone    • Cough    • Decreased range of motion    • Degeneration of cervical intervertebral disc    • Depressed    •  Dermatitis    • Diarrhea    • Dry eye    • Dysphagia    • Edema of extremities    • Esophagitis    • Fatigue    • Flu vaccine need    • GERD (gastroesophageal reflux disease)    • Glenohumeral arthritis, left    • Hemorrhoids    • Hiatal hernia    • Hip pain    • History of colonic polyps    • History of DVT in adulthood    • History of transfusion    • Hyperactivity of bladder    • Hyperlipidemia    • Hypertension    • Hypokalemia    • Irritable bowel syndrome with diarrhea    • Lactose intolerance    • Left ankle pain    • Leg cramps    • Leg swelling    • Limitation of joint motion of ankle, left    • Lower extremity tendinopathy    • Medicare annual wellness visit, subsequent    • Methicillin resistant Staphylococcus aureus infection    • Microscopic hematuria    • Migraine    • Obstructive sleep apnea    • Osteoarthritis, chronic    • Pain, foot, left, chronic    • Pancreatitis, chronic (HCC)    • Pes planus    • PONV (postoperative nausea and vomiting)     SCOPOLAMINE HELPS-USED DURING LAST SURGERY    • Rectal leakage    • Retrocalcaneal bursitis    • Right ankle instability    • Right ankle pain    • Right ankle sprain    • Right ankle swelling    • Rotator cuff tear, left    • S/P shoulder surgery    • Shoulder region pain    • Spinal stenosis    • Swallowing difficulty    • Urge incontinence of urine    • Urinary incontinence    • Visit for screening mammogram    • Wears contact lenses    • Wears dentures     UPPER AND LOWER    • Wears hearing aid     DOESN'T HAVE WITH HER   • Weight gain      Past Surgical History:   Procedure Laterality Date   • BACK SURGERY      LUMBAR FUSION    • CERVICAL FUSION      plates, rods, and screws   • CHOLECYSTECTOMY     • COLONOSCOPY      2 YEARS AGO    • ENDOSCOPY N/A 10/14/2021    Procedure: ESOPHAGOGASTRODUODENOSCOPY with biopsy and polypectomy;  Surgeon: Hussain Basilio MD;  Location: [facility] MAIN OR;  Service: Gastroenterology;  Laterality: N/A;  gastritis and polyps    • EYE SURGERY      LASER FOR KERITONOSIS    • GALLBLADDER SURGERY     • HERNIA REPAIR     • HYSTERECTOMY     • INGUINAL HERNIA REPAIR     • JOINT REPLACEMENT      BILATERAL KNEES, BILATERAL HIPS   • NECK SURGERY     • [place] RECONSTR TOTAL SHOULDER IMPLANT Left 2/13/2017    Procedure: TOTAL SHOULDER REPLACEMENT LEFT;  Surgeon: Benigno Chavez MD;  Location: [facility];  Service: Orthopedics   • REPLACEMENT TOTAL KNEE     • SHOULDER LIGAMENT REPAIR Right    • TONSILLECTOMY     • TOTAL HIP ARTHROPLASTY     • UPPER GASTROINTESTINAL ENDOSCOPY        General Information     Row Name 04/08/22 1420          OT Time and Intention    Document Type evaluation  -RB     Mode of Treatment individual therapy;occupational therapy  -RB     Row Name 04/08/22 1420          General Information    Patient Profile Reviewed other (see comments)  -RB     Prior Level of Function independent:;ADL's;transfer  -RB     Existing Precautions/Restrictions fall;hip, anterior;weight bearing  -RB     Barriers to Rehab previous functional deficit;cognitive status  -RB     Row Name 04/08/22 1420          Living Environment    People in Home significant other  -RB     Row Name 04/08/22 1420          Home Main Entrance    Number of Stairs, Main Entrance other (see comments)  unable to provide a number  -RB     Row Name 04/08/22 1420          Cognition    Orientation Status (Cognition) oriented to;person  -RB     Row Name 04/08/22 1420          Safety Issues, Functional Mobility    Safety Issues Affecting Function (Mobility) ability to follow commands;awareness of need for assistance;impulsivity;insight into deficits/self-awareness;judgment;problem-solving;safety precaution awareness;positioning of assistive device;safety precautions follow-through/compliance;sequencing abilities  -RB     Impairments Affecting Function (Mobility) balance;cognition;endurance/activity tolerance;pain;range of motion (ROM);strength;postural/trunk control  -RB            User Key  (r) = Recorded By, (t) = Taken By, (c) = Cosigned By    Initials Name Provider Type    Daniella Couch OT Occupational Therapist                 Mobility/ADL's     Row Name 04/08/22 1421          Bed Mobility    Bed Mobility supine-sit;sit-supine  -RB     Supine-Sit [place] (Bed Mobility) minimum assist (75% patient effort);verbal cues;nonverbal cues (demo/gesture)  -RB     Sit-Supine [place] (Bed Mobility) maximum assist (25% patient effort);2 person assist;nonverbal cues (demo/gesture);verbal cues  -RB     Assistive Device (Bed Mobility) bed rails  -RB     Row Name 04/08/22 1421          Transfers    Transfers sit-stand transfer;stand-sit transfer  -RB     Comment, (Transfers) Took side steps along the bed. impulsive. quickly sat without warning.  -RB     Sit-Stand [place] (Transfers) minimum assist (75% patient effort);verbal cues;nonverbal cues (demo/gesture)  -RB     Stand-Sit [place] (Transfers) minimum assist (75% patient effort);2 person assist  -RB     Row Name 04/08/22 1421          Sit-Stand Transfer    Assistive Device (Sit-Stand Transfers) walker, front-wheeled  -RB     Row Name 04/08/22 1421          Functional Mobility    Functional Mobility- Ind. Level not appropriate to assess  -     Row Name 04/08/22 1421          Activities of Daily Living    BADL Assessment/Intervention --  pt unable to participate in any ADL's due to cognitive status  -     Row Name 04/08/22 1421          Mobility    Extremity Weight-bearing Status left lower extremity  -RB     Left Lower Extremity (Weight-bearing Status) weight-bearing as tolerated (WBAT)  -     Row Name 04/08/22 1421          Stand-Sit Transfer    Assistive Device (Stand-Sit Transfers) walker, front-wheeled  -           User Key  (r) = Recorded By, (t) = Taken By, (c) = Cosigned By    Initials Name Provider Type    Daniella Couch OT Occupational Therapist               Obj/Interventions     Row Name 04/08/22  1425          Sensory Assessment (Somatosensory)    Sensory Assessment (Somatosensory) sensation intact  -RB     Row Name 04/08/22 1425          Vision Assessment/Intervention    Visual Impairment/Limitations WFL  -RB     Row Name 04/08/22 1425          Range of Motion Comprehensive    Comment, General Range of Motion LUE painful. only able to range her shoulder ~30* flexion  -RB     Row Name 04/08/22 1425          Strength Comprehensive (MMT)    Comment, General Manual Muscle Testing (MMT) Assessment generalized weakness, LUE weaker than RUE  -RB     Row Name 04/08/22 1425          Motor Skills    Therapeutic Exercise --  gentle PROM provided to the LUE as tolerated.  -RB     Row Name 04/08/22 1425          Balance    Balance Assessment sitting static balance;sitting dynamic balance;standing static balance  -RB     Static Sitting Balance supervision  -RB     Dynamic Sitting Balance contact guard;minimal assist  -RB     Position, Sitting Balance sitting edge of bed  -RB     Static Standing Balance minimal assist  -RB     Position/Device Used, Standing Balance walker, front-wheeled  -RB     Balance Interventions occupation based/functional task  -RB           User Key  (r) = Recorded By, (t) = Taken By, (c) = Cosigned By    Initials Name Provider Type    RB Daniella Bonner OT Occupational Therapist               Goals/Plan     Row Name 04/08/22 1429          Bed Mobility Goal 1 (OT)    Activity/Assistive Device (Bed Mobility Goal 1, OT) bed mobility activities, all  -RB     [place] Level/Cues Needed (Bed Mobility Goal 1, OT) supervision required  -RB     Time Frame (Bed Mobility Goal 1, OT) short term goal (STG);2 weeks  -RB     Progress/Outcomes (Bed Mobility Goal 1, OT) continuing progress toward goal  -RB     Row Name 04/08/22 1429          Transfer Goal 1 (OT)    Activity/Assistive Device (Transfer Goal 1, OT) transfers, all  -RB     [place] Level/Cues Needed (Transfer Goal 1, OT) supervision  required  -RB     Time Frame (Transfer Goal 1, OT) short term goal (STG);2 weeks  -RB     Progress/Outcome (Transfer Goal 1, OT) continuing progress toward goal  -RB     Row Name 04/08/22 1429          Dressing Goal 1 (OT)    Activity/Device (Dressing Goal 1, OT) dressing skills, all  -RB     [place]/Cues Needed (Dressing Goal 1, OT) supervision required  -RB     Time Frame (Dressing Goal 1, OT) short term goal (STG);2 weeks  -RB     Progress/Outcome (Dressing Goal 1, OT) continuing progress toward goal  -RB     Row Name 04/08/22 1429          Toileting Goal 1 (OT)    Activity/Device (Toileting Goal 1, OT) toileting skills, all  -RB     [place] Level/Cues Needed (Toileting Goal 1, OT) supervision required  -RB     Time Frame (Toileting Goal 1, OT) short term goal (STG);2 weeks  -RB     Progress/Outcome (Toileting Goal 1, OT) continuing progress toward goal  -RB     Row Name 04/08/22 1429          Grooming Goal 1 (OT)    Activity/Device (Grooming Goal 1, OT) grooming skills, all  -RB     [place] (Grooming Goal 1, OT) supervision required  -RB     Time Frame (Grooming Goal 1, OT) short term goal (STG);2 weeks  -RB     Progress/Outcome (Grooming Goal 1, OT) continuing progress toward goal  -RB     Row Name 04/08/22 1429          Therapy Assessment/Plan (OT)    Planned Therapy Interventions (OT) transfer/mobility retraining;strengthening exercise;ROM/therapeutic exercise;activity tolerance training;adaptive equipment training;BADL retraining;functional balance retraining;occupation/activity based interventions;patient/caregiver education/training;cognitive/visual perception retraining  -RB           User Key  (r) = Recorded By, (t) = Taken By, (c) = Cosigned By    Initials Name Provider Type    RB Daniella Bonner, OT Occupational Therapist               Clinical Impression     Row Name 04/08/22 1428          Pain Assessment    Pretreatment Pain Rating 0/10 - no pain  -RB     Posttreatment Pain Rating  0/10 - no pain  -RB     Row Name 04/08/22 1428          Plan of Care Review    Plan of Care Reviewed With patient  -RB     Progress no change  -RB     Row Name 04/08/22 1428          Therapy Assessment/Plan (OT)    Rehab Potential (OT) good, to achieve stated therapy goals  -RB     Criteria for Skilled Therapeutic Interventions Met (OT) yes;skilled treatment is necessary  -RB     Therapy Frequency (OT) 5 times/wk  -RB     Row Name 04/08/22 1428          Therapy Plan Review/Discharge Plan (OT)    Anticipated Discharge Disposition (OT) [facility] nursing [facility]  -RB     Row Name 04/08/22 1428          Vital Signs    O2 Delivery Pre Treatment room air  -RB     O2 Delivery Intra Treatment room air  -RB     O2 Delivery Post Treatment room air  -RB     Pre Patient Position Supine  -RB     Intra Patient Position Standing  -RB     Post Patient Position Supine  -RB     Row Name 04/08/22 1428          Positioning and Restraints    Pre-Treatment Position in bed  -RB     Post Treatment Position bed  -RB     In Bed notified nsg;supine;call light within reach;encouraged to call for assist;exit alarm on;legs elevated  -RB           User Key  (r) = Recorded By, (t) = Taken By, (c) = Cosigned By    Initials Name Provider Type    RB Daniella Bonner, OT Occupational Therapist               Outcome Measures     Row Name 04/08/22 1429          How much help from another is currently needed...    Putting on and taking off regular lower body clothing? 1  -RB     Bathing (including washing, rinsing, and drying) 1  -RB     Toileting (which includes using toilet bed pan or urinal) 1  -RB     Putting on and taking off regular upper body clothing 1  -RB     Taking care of personal grooming (such as brushing teeth) 2  -RB     Eating meals 2  -RB     AM-PAC 6 Clicks Score (OT) 8  -RB     Row Name 04/08/22 1429          Modified [place] Scale    Modified [place] Scale 4 - Moderately severe disability.  Unable to walk without assistance, and unable  to attend to own bodily needs without assistance.  -RB     Row Name 04/08/22 1429          Functional Assessment    Outcome Measure Options AM-PAC 6 Clicks Daily Activity (OT);Modified [place]  -RB           User Key  (r) = Recorded By, (t) = Taken By, (c) = Cosigned By    Initials Name Provider Type    RB Daniella Bonner OT Occupational Therapist                Occupational Therapy Education                 Title: PT OT SLP Therapies (In Progress)     Topic: Occupational Therapy (In Progress)     Point: ADL training (In Progress)     Description:   Instruct learner(s) on proper safety adaptation and remediation techniques during self care or transfers.   Instruct in proper use of assistive devices.              Learning Progress Summary           Patient Eager, E, NR by RB at 4/8/2022 1431    Comment: safety, d/c planning                   Point: Home exercise program (In Progress)     Description:   Instruct learner(s) on appropriate technique for monitoring, assisting and/or progressing therapeutic exercises/activities.              Learning Progress Summary           Patient Eager, E, NR by RB at 4/8/2022 1431    Comment: safety, d/c planning                   Point: Precautions (In Progress)     Description:   Instruct learner(s) on prescribed precautions during self-care and functional transfers.              Learning Progress Summary           Patient Eager, E, NR by RB at 4/8/2022 1431    Comment: safety, d/c planning                   Point: Body mechanics (In Progress)     Description:   Instruct learner(s) on proper positioning and spine alignment during self-care, functional mobility activities and/or exercises.              Learning Progress Summary           Patient Eager, E, NR by RB at 4/8/2022 1431    Comment: safety, d/c planning                               User Key     Initials Effective Dates Name Provider Type [facility] 06/16/21 -  Daniella Bonner OT Occupational Therapist OT               OT Recommendation and Plan  Planned Therapy Interventions (OT): transfer/mobility retraining, strengthening exercise, ROM/therapeutic exercise, activity tolerance training, adaptive equipment training, BADL retraining, functional balance retraining, occupation/activity based interventions, patient/caregiver education/training, cognitive/visual perception retraining  Therapy Frequency (OT): 5 times/wk  Plan of Care Review  Plan of Care Reviewed With: patient  Progress: no change     Time Calculation:    Time Calculation- OT     Row Name 04/08/22 1418             Time Calculation- OT    OT Start Time 1246  -RB      OT Stop Time 1314  -RB      OT Time Calculation (min) 28 min  -RB      Total Timed Code Minutes- OT 18 minute(s)  -RB      OT Received On 04/08/22  -RB      OT - Next Appointment 04/11/22  -RB      OT Goal Re-Cert Due Date 04/22/22  -RB              Timed Charges    49285 - OT Therapeutic Activity Minutes 18  -RB              Untimed Charges    OT Eval/Re-eval Minutes 10  -RB              Total Minutes    Timed Charges Total Minutes 18  -RB      Untimed Charges Total Minutes 10  -RB       Total Minutes 28  -RB            User Key  (r) = Recorded By, (t) = Taken By, (c) = Cosigned By    Initials Name Provider Type    RB Daniella Bonner OT Occupational Therapist              Therapy Charges for Today     Code Description Service Date Service Provider Modifiers Qty    [id number]  OT EVAL MOD COMPLEXITY 2 4/8/2022 Daniella Bonner OT GO 1    [id number]  OT THERAPEUTIC ACT EA 15 MIN 4/8/2022 Daniella Bonner OT GO 1               Daniella Bonner OT  4/8/2022

## 2022-04-08 NOTE — PROGRESS NOTES
[facility] Clinical Pharmacy Services: Enoxaparin Consult    Jaylyn Fuentes has a pharmacy consult to dose prophylactic enoxaparin per Dr. Ulrich's request.     Indication: VTE prophylaxis  Home Anticoagulation: None     Relevant clinical data and objective history reviewed:  72 y.o. female   95.9 kg (211 lb 6.7 oz)   Body mass index is 37.46 kg/m².   Results from last 7 days   Lab Units 04/08/22  0201   PLATELETS 10*3/mm3 228     Estimated Creatinine Clearance: 73.6 mL/min (by C-G formula based on SCr of 0.75 mg/dL).    Assessment/Plan    Will start patient on enoxaparin 40mg subcutaneous every 24 hours, adjusted for renal function. Consult order will be discontinued but pharmacy will continue to follow.     Damari Mosley, PharmD, [facility]  Clinical Pharmacy Specialist, Emergency Medicine   Phone: [phone]

## 2022-04-08 NOTE — THERAPY EVALUATION
Acute Care - Speech Language Pathology   Swallow Initial Evaluation [facility]     Patient Name: Jaylyn Fuentes  : 1950  MRN: [id number]  Today's Date: 2022               Admit Date: 2022    Visit Dx:     ICD-10-CM ICD-9-CM   1. Altered mental status, unspecified altered mental status type  R41.82 780.97   2. Cough  R05.9 786.2   3. Primary hypertension  I10 401.9   4. Hyperlipidemia, unspecified hyperlipidemia type  E78.5 272.4   5. Gastroesophageal reflux disease, unspecified whether esophagitis present  K21.9 530.81   6. History of revision of total hip arthroplasty  Z96.649 V43.64     Patient Active Problem List   Diagnosis   • Osteoarthrosis, localized, primary, shoulder region   • Hypertension   • Depressed   • Status post total replacement of left shoulder   • Duodenitis   • Abnormal CT scan, small bowel   • Nausea and vomiting   • SIN (obstructive sleep apnea)   • Snoring   • Hypersomnia   • Non-restorative sleep   • Class 2 obesity   • Poor memory   • Frequent falls   • Migraine headache   • Altered mental status     Past Medical History:   Diagnosis Date   • Abdominal pain    • Abnormal CT of the abdomen    • Abnormal glucose    • Abnormal liver enzymes    • Abnormal urinalysis    • Achilles tendinitis     left   • Acid reflux disease    • Acromioclavicular joint arthritis    • Acromioclavicular joint pain, bilateral    • Aftercare following knee joint replacement surgery    • Alcohol abuse    • Alternating constipation and diarrhea    • Anemia    • Arthralgia of multiple sites    • Arthritis    • BMI 34.0-34.9,adult    • Carpal tunnel syndrome    • Cataract    • Cellulitis of extremity    • Chronic insomnia    • Chronic kidney disease, stage III (moderate) ([facility])    • Contact dermatitis    • Contusion of bone    • Cough    • Decreased range of motion    • Degeneration of cervical intervertebral disc    • Depressed    • Dermatitis    • Diarrhea    • Dry eye    • Dysphagia    • Edema  of extremities    • Esophagitis    • Fatigue    • Flu vaccine need    • GERD (gastroesophageal reflux disease)    • Glenohumeral arthritis, left    • Hemorrhoids    • Hiatal hernia    • Hip pain    • History of colonic polyps    • History of DVT in adulthood    • History of transfusion    • Hyperactivity of bladder    • Hyperlipidemia    • Hypertension    • Hypokalemia    • Irritable bowel syndrome with diarrhea    • Lactose intolerance    • Left ankle pain    • Leg cramps    • Leg swelling    • Limitation of joint motion of ankle, left    • Lower extremity tendinopathy    • Medicare annual wellness visit, subsequent    • Methicillin resistant Staphylococcus aureus infection    • Microscopic hematuria    • Migraine    • Obstructive sleep apnea    • Osteoarthritis, chronic    • Pain, foot, left, chronic    • Pancreatitis, chronic (HCC)    • Pes planus    • PONV (postoperative nausea and vomiting)     SCOPOLAMINE HELPS-USED DURING LAST SURGERY    • Rectal leakage    • Retrocalcaneal bursitis    • Right ankle instability    • Right ankle pain    • Right ankle sprain    • Right ankle swelling    • Rotator cuff tear, left    • S/P shoulder surgery    • Shoulder region pain    • Spinal stenosis    • Swallowing difficulty    • Urge incontinence of urine    • Urinary incontinence    • Visit for screening mammogram    • Wears contact lenses    • Wears dentures     UPPER AND LOWER    • Wears hearing aid     DOESN'T HAVE WITH HER   • Weight gain      Past Surgical History:   Procedure Laterality Date   • BACK SURGERY      LUMBAR FUSION    • CERVICAL FUSION      plates, rods, and screws   • CHOLECYSTECTOMY     • COLONOSCOPY      2 YEARS AGO    • ENDOSCOPY N/A 10/14/2021    Procedure: ESOPHAGOGASTRODUODENOSCOPY with biopsy and polypectomy;  Surgeon: Hussain Basilio MD;  [place] [facility];  Service: Gastroenterology;  Laterality: N/A;  gastritis and polyps   • EYE SURGERY      LASER FOR KERITONOSIS    • GALLBLADDER  SURGERY     • HERNIA REPAIR     • HYSTERECTOMY     • INGUINAL HERNIA REPAIR     • JOINT REPLACEMENT      BILATERAL KNEES, BILATERAL HIPS   • NECK SURGERY     • GA RECONSTR TOTAL SHOULDER IMPLANT Left 2/13/2017    Procedure: TOTAL SHOULDER REPLACEMENT LEFT;  Surgeon: Benigno Chavez MD;  Location: [facility];  Service: Orthopedics   • REPLACEMENT TOTAL KNEE     • SHOULDER LIGAMENT REPAIR Right    • TONSILLECTOMY     • TOTAL HIP ARTHROPLASTY     • UPPER GASTROINTESTINAL ENDOSCOPY         SLP Recommendation and Plan  SLP Swallowing Diagnosis: mild (04/08/22 1000)  SLP Diet Recommendation: soft textures, ground, thin liquids (04/08/22 1000)  Recommended Precautions and Strategies: upright posture during/after eating, small bites of food and sips of liquid (04/08/22 1000)  SLP Rec. for Method of Medication Administration: meds whole, meds crushed, with pudding or applesauce (04/08/22 1000)     Monitor for Signs of Aspiration: notify SLP if any concerns (04/08/22 1000)  Recommended Diagnostics: reassess via clinical swallow evaluation (04/08/22 1000)  Swallow Criteria for Skilled Therapeutic Interventions Met: demonstrates skilled criteria (04/08/22 1000)     Rehab Potential/Prognosis, Swallowing: good, to achieve stated therapy goals (04/08/22 1000)  Therapy Frequency (Swallow): PRN (04/08/22 1000)  Predicted Duration Therapy Intervention (Days): until discharge (04/08/22 1000)                                         SWALLOW EVALUATION (last 72 hours)     SLP Adult Swallow Evaluation     Row Name 04/08/22 1000                   Rehab Evaluation    Document Type evaluation  -SA        Subjective Information complains of  stomach pain, trouble speaking  -SA        Patient Observations alert;cooperative  -SA        Patient Effort good  -SA        Symptoms Noted During/After Treatment none  -SA                  General Information    Patient Profile Reviewed yes  -SA        Pertinent History Of Current Problem adm w/  AMS; s/p hip revision 4/5  -SA        Current Method of Nutrition NPO  -SA        Prior Level of Function-Communication unknown  -SA        Prior Level of Function-Swallowing unknown  -        Plans/Goals Discussed with patient;agreed upon  -        Barriers to Rehab none identified  -        Patient's Goals for Discharge patient did not state  -                  Oral Musculature and Cranial Nerve Assessment    Oral Motor General Assessment oral labial or buccal impairment  -SA        Oral Labial or Buccal Impairment, Detail, Cranial Nerve VII (Facial): left labial droop;other (see comments)  on retraction  -SA                  Clinical Swallow Eval    Clinical Swallow Evaluation Summary Pt demonstrated no overt s/s aspiration with thin, puree, soft/mixed consistencies.  Pt took independent small sips of liquids.  Slow, but adequate mastication.  REC mech soft, ground meats w/ thin liquids.  Meds whole or crushed w/ puree.  Upright with all po.  -SA                  SLP Evaluation Clinical Impression    SLP Swallowing Diagnosis mild  -SA        Functional Impact risk of aspiration/pneumonia  -        Rehab Potential/Prognosis, Swallowing good, to achieve stated therapy goals  -        Swallow Criteria for Skilled Therapeutic Interventions Met demonstrates skilled criteria  -                  Recommendations    Therapy Frequency (Swallow) PRN  -        Predicted Duration Therapy Intervention (Days) until discharge  -SA        SLP Diet Recommendation soft textures;ground;thin liquids  -        Recommended Diagnostics reassess via clinical swallow evaluation  -        Recommended Precautions and Strategies upright posture during/after eating;small bites of food and sips of liquid  -SA        Oral Care Recommendations Oral Care BID/PRN  -SA        SLP Rec. for Method of Medication Administration meds whole;meds crushed;with pudding or applesauce  -SA        Monitor for Signs of Aspiration notify SLP  if any concerns  -SA                  Swallow Goals (SLP)    Oral Nutrition/Hydration Goal Selection (SLP) oral nutrition/hydration, SLP goal 1  -SA                  Oral Nutrition/Hydration Goal 1 (SLP)    Oral Nutrition/Hydration Goal 1, SLP Pt will tolerate least restrictive diet  -SA        Time Frame (Oral Nutrition/Hydration Goal 1, SLP) by discharge  -SA              User Key  (r) = Recorded By, (t) = Taken By, (c) = Cosigned By    Initials Name Effective Dates    Kaitlynn Enrique MS CCC-SLP 06/16/21 -                 EDUCATION  The patient has been educated in the following areas:   Dysphagia (Swallowing Impairment) Modified Diet Instruction.        SLP GOALS     Row Name 04/08/22 1000             Oral Nutrition/Hydration Goal 1 (SLP)    Oral Nutrition/Hydration Goal 1, SLP Pt will tolerate least restrictive diet  -SA      Time Frame (Oral Nutrition/Hydration Goal 1, SLP) by discharge  -            User Key  (r) = Recorded By, (t) = Taken By, (c) = Cosigned By    Initials Name Provider Type    Kaitlynn Enrique MS CCC-SLP Speech and Language Pathologist              SLP Outcome Measures (last 72 hours)     SLP Outcome Measures     Row Name 04/08/22 1000             SLP Outcome Measures    Outcome Measure Used? Adult NOMS  -SA              Adult FCM Scores    FCM Chosen Swallowing  -SA      Swallowing FCM Score 4  -SA            User Key  (r) = Recorded By, (t) = Taken By, (c) = Cosigned By    Initials Name Effective Dates    Kaitlynn Enrique MS CCC-SLP 06/16/21 -                  Time Calculation:    Time Calculation- SLP     Row Name 04/08/22 1008             Time Calculation- SLP    SLP Start Time 0930  -            User Key  (r) = Recorded By, (t) = Taken By, (c) = Cosigned By    Initials Name Provider Type    Kaitlynn Enrique MS CCC-SLP Speech and Language Pathologist                Therapy Charges for Today     Code Description Service Date Service Provider Modifiers Qty    [id number]   [facility] PH[facility]NG SWALLOW 4 4/8/2022 Kaitlynn Nieves, MS CCC-SLP GN 1               Kaitlynn Nieves MS CCC-SLP  4/8/2022

## 2022-04-08 NOTE — PLAN OF CARE
Pt admitted to [facility] 2/2 to AMS s/p L total hip arthroplasty ([place], 4/5/21). Pt diagnosed with ? toxic encephalopathy, work up on-going. Pmhx includes hypertension, urinary incontinence and sleep apnea. She lives with her partner in a house, she is retired. Pt oriented to herself and birthday only. She is unable to provide any meaningful information on her PLOF or her hip surgery. Per discussions with the RN, pt's partner reported it is an anterior hip, WBAT and no precautions discussed when d/c from [place]. She sat at the EOB with Min A. Pt stood multiple times with Min A, she was fairly unsteady with the use of a walker. Due to cognitive state and attempting to sit without warning, she only took side steps along the bed. Pt with high pain in her LUE with weight bearing or AROM above ~30* flexion. Pt closing her eyes randomly during the session and unable to participate in most ADL's. SNF recommended at d/c pending her progress.     Patient was not wearing a face mask during this therapy encounter. Therapist used appropriate personal protective equipment including mask and gloves. Mask used was standard procedure mask. Appropriate PPE was worn during the entire therapy session. Hand hygiene was completed before and after therapy session. Patient is not in enhanced droplet precautions.

## 2022-04-08 NOTE — NURSING NOTE
04/08/22 1310   Wound 04/08/22 0835 Left lateral greater trochanter Incision   Placement Date/Time: 04/08/22 0835   Present on Hospital Admission: Yes  Side: Left  Orientation: lateral  Location: greater trochanter  Primary Wound Type: Incision   Closure Sutures;Liquid skin adhesive;Approximated   Periwound intact;dry  (bruising)   Periwound Temperature warm   Periwound Skin Turgor soft   Drainage Characteristics/Odor serosanguineous   Drainage Amount scant   Care, Wound   (removed Prevena incisional VAC which was nonfunctional)   Dressing Care dressing applied  (Silicone border incisional dressing)     CWON note: pt seen in the ED for evaluation of incisional VAC which was POA. Pt had cut the tubing and the Prevena dressing was not holding any suction. Dressing was easily removed with adhesive remover. Incision remains well approximated, no open areas. Post-op silicone border incisional dressing was placed. This can be change every other day and as needed for a few days, then left open to air. Orders placed in EPIC

## 2022-04-08 NOTE — ED NOTES
Urine collected and sent via in/out cath. Pt noted to be incontinent of urine, even though pt has been asked several times if she could urinate for a specimen. Pt states she did not know that she had been incontinent. Wet pants removed, gown and linens changed. Pt placed in brief.

## 2022-04-08 NOTE — ED NOTES
Nursing report ED to floor  Jaylyn Fuentes  72 y.o.  female    HPI :   Chief Complaint   Patient presents with   • Cough       Admitting doctor:   Brett Davila MD    Admitting diagnosis:   The primary encounter diagnosis was Altered mental status, unspecified altered mental status type. Diagnoses of Cough, Primary hypertension, Hyperlipidemia, unspecified hyperlipidemia type, Gastroesophageal reflux disease, unspecified whether esophagitis present, and History of revision of total hip arthroplasty were also pertinent to this visit.    Code status:   Current Code Status     Date Active Code Status Order ID Comments User Context       4/8/2022 0521 CPR (Attempt to Resuscitate) [id number]  Ruel Molina, APRN ED     Advance Care Planning Activity      Questions for Current Code Status     Question Answer    Code Status (Patient has no pulse and is not breathing) CPR (Attempt to Resuscitate)    Medical Interventions (Patient has pulse or is breathing) Full Support          Allergies:   Baclofen, Keflex [cephalexin], Melatonin, and Sulfa antibiotics    Intake and Output    Intake/Output Summary (Last 24 hours) at 4/8/2022 0537  Last data filed at 4/8/2022 0426  Gross per 24 hour   Intake 500 ml   Output --   Net 500 ml       Weight:       04/08/22  0151   Weight: 95.9 kg (211 lb 6.7 oz)       Most recent vitals:   Vitals:    04/08/22 0500 04/08/22 0514 04/08/22 0515 04/08/22 0530   BP:   142/97    Pulse: 88 93 88 86   Resp:       Temp:       TempSrc:       SpO2: 98% 98% 98% 98%   Weight:           Active LDAs/IV Access:   Lines, Drains & Airways     Active LDAs     Name Placement date Placement time Site Days    Peripheral IV 04/08/22 0159 Right Antecubital 04/08/22  0159  Antecubital  less than 1    External Urinary Catheter 04/08/22  0518  --  less than 1                Labs (abnormal labs have a star):   Labs Reviewed   COMPREHENSIVE METABOLIC PANEL - Abnormal; Notable for the following components:        Result Value    Glucose 120 (*)     AST (SGOT) 33 (*)     All other components within normal limits    Narrative:     GFR Normal >60  Chronic Kidney Disease <60  Kidney Failure <15     URINALYSIS W/ MICROSCOPIC IF INDICATED (NO CULTURE) - Abnormal; Notable for the following components:    Ketones, UA 15 mg/dL (1+) (*)     Blood, UA Small (1+) (*)     All other components within normal limits   CBC WITH AUTO DIFFERENTIAL - Abnormal; Notable for the following components:    Hemoglobin 11.6 (*)     Hematocrit 33.4 (*)     Neutrophil % 79.3 (*)     Lymphocyte % 10.7 (*)     Neutrophils, Absolute 8.00 (*)     All other components within normal limits   URINALYSIS, MICROSCOPIC ONLY - Abnormal; Notable for the following components:    RBC, UA 6-12 (*)     All other components within normal limits   RESPIRATORY PANEL PCR W/ COVID-19 (SARS-COV-2) THERESA/ROBERT/MATHEUS/PAD/COR/MAD/PEPE IN-HOUSE, NP SWAB IN UTM/VTP, 3-4 HR TAT - Normal    Narrative:     In the setting of a positive respiratory panel with a viral infection PLUS a negative procalcitonin without other underlying concern for bacterial infection, consider observing off antibiotics or discontinuation of antibiotics and continue supportive care. If the respiratory panel is positive for atypical bacterial infection (Bordetella pertussis, Chlamydophila pneumoniae, or Mycoplasma pneumoniae), consider antibiotic de-escalation to target atypical bacterial infection.   PROTIME-INR - Normal   BNP (IN-HOUSE) - Normal    Narrative:     Among patients with dyspnea, NT-proBNP is highly sensitive for the detection of acute congestive heart failure. In addition NT-proBNP of <300 pg/ml effectively rules out acute congestive heart failure with 99% negative predictive value.    Results may be falsely decreased if patient taking Biotin.     TROPONIN (IN-HOUSE) - Normal    Narrative:     Troponin T Reference Range:  <= 0.03 ng/mL-   Negative for AMI  >0.03 ng/mL-     Abnormal for myocardial  "necrosis.  Clinicians would have to utilize clinical acumen, EKG, Troponin and serial changes to determine if it is an Acute Myocardial Infarction or myocardial injury due to an underlying chronic condition.       Results may be falsely decreased if patient taking Biotin.     LACTIC ACID, PLASMA - Normal   PROCALCITONIN - Normal    Narrative:     As a Marker for Sepsis (Non-Neonates):    1. <0.5 ng/mL represents a low risk of severe sepsis and/or septic shock.  2. >2 ng/mL represents a high risk of severe sepsis and/or septic shock.    As a Marker for Lower Respiratory Tract Infections that require antibiotic therapy:    PCT on Admission    Antibiotic Therapy       6-12 Hrs later    >0.5                Strongly Recommended  >0.25 - <0.5        Recommended   0.1 - 0.25          Discouraged              Remeasure/reassess PCT  <0.1                Strongly Discouraged     Remeasure/reassess PCT    As 28 day mortality risk marker: \"Change in Procalcitonin Result\" (>80% or <=80%) if Day 0 (or Day 1) and Day 4 values are available. Refer to [email or web address]    Change in PCT <=80%  A decrease of PCT levels below or equal to 80% defines a positive change in PCT test result representing a higher risk for 28-day all-cause mortality of patients diagnosed with severe sepsis for septic shock.    Change in PCT >80%  A decrease of PCT levels of more than 80% defines a negative change in PCT result representing a lower risk for 28-day all-cause mortality of patients diagnosed with severe sepsis or septic shock.      BLOOD CULTURE   BLOOD CULTURE   CBC AND DIFFERENTIAL    Narrative:     The following orders were created for panel order CBC & Differential.  Procedure                               Abnormality         Status                     ---------                               -----------         ------                     CBC Auto Differential[[id number]]        Abnormal            Final result             "     Please view results for these tests on the individual orders.       EKG:   ECG 12 Lead   Preliminary Result   HEART RATE= 85  bpm   RR Interval= 720  ms   MT Interval= 150  ms   P Horizontal Axis= 22  deg   P Front Axis= 65  deg   QRSD Interval= 76  ms   QT Interval= 382  ms   QRS Axis= 29  deg   T Wave Axis= 59  deg   - OTHERWISE NORMAL ECG -   Sinus rhythm   Atrial premature complex   Electronically Signed By:    Date and Time of Study: 2022-04-08 01:51:28          Meds given in ED:   Medications   sodium chloride 0.9 % flush 10 mL (10 mL Intravenous Given 4/8/22 0318)   sodium chloride 0.9 % flush 10 mL (has no administration in time range)   nitroglycerin (NITROSTAT) SL tablet 0.4 mg (has no administration in time range)   acetaminophen (TYLENOL) tablet 650 mg (has no administration in time range)   ondansetron (ZOFRAN) tablet 4 mg (has no administration in time range)     Or   ondansetron (ZOFRAN) injection 4 mg (has no administration in time range)   aluminum-magnesium hydroxide-simethicone (MAALOX MAX) 400-400-40 MG/5ML suspension 15 mL (has no administration in time range)   sodium chloride 0.9 % bolus 500 mL (0 mL Intravenous Stopped 4/8/22 0426)   ondansetron (ZOFRAN) injection 4 mg (4 mg Intravenous Given 4/8/22 0205)   ondansetron (ZOFRAN) injection 4 mg (4 mg Intravenous Given 4/8/22 0317)   metoclopramide (REGLAN) injection 5 mg (5 mg Intravenous Given 4/8/22 0452)   iopamidol (ISOVUE-370) 76 % injection 100 mL (85 mL Intravenous Given by Other 4/8/22 0442)       Imaging results:  CT Head Without Contrast    Result Date: 4/8/2022  No acute intracranial findings.  Radiation dose reduction techniques were utilized, including automated exposure control and exposure modulation based on body size.  This report was finalized on 4/8/2022 3:03 AM by Dr. Saba Armijo M.D.      XR Chest 1 View    Result Date: 4/8/2022  No acute findings.  This report was finalized on 4/8/2022 2:11 AM by Dr. Walter  KATRIN Armijo.      CT Angiogram Chest    Result Date: 4/8/2022  No acute intrathoracic findings.  Radiation dose reduction techniques were utilized, including automated exposure control and exposure modulation based on body size.  This report was finalized on 4/8/2022 4:59 AM by Dr. Saba Armijo M.D.        Ambulatory status:   - uses cane, up with assist only. Recent hip revision    Social issues:   Social History     Socioeconomic History   • Marital status: Single   Tobacco Use   • Smoking status: Former Smoker   • Smokeless tobacco: Never Used   Vaping Use   • Vaping Use: Never used   Substance and Sexual Activity   • Alcohol use: Yes     Comment: [employer]    • Drug use: No   • Sexual activity: Defer       NIH Stroke Scale:        Nursing report ED to floor:

## 2022-04-08 NOTE — CASE MANAGEMENT/SOCIAL WORK
Discharge Planning Assessment  [facility]     Patient Name: Jaylyn Fuentes  MRN: [id number]  Today's Date: 4/8/2022    Admit Date: 4/8/2022     Discharge Needs Assessment     Row Name 04/08/22 1115       Transition Planning    Patient/Family Anticipated Services at Transition home health care    Row Name 04/08/22 1110       Living Environment    People in Home significant other    Name(s) of People in Home LISANDRA Smith    Current Living Arrangements home    Primary Care Provided by self    Provides Primary Care For no one    Family Caregiver if Needed significant other    Family Caregiver Names Sarah    Quality of Family Relationships supportive       Resource/Environmental Concerns    Resource/Environmental Concerns none       Transition Planning    Patient/Family Anticipates Transition to home with family    Transportation Anticipated family or friend will provide       Discharge Needs Assessment    Equipment Currently Used at Home cane, straight;grab bar    Concerns Comments Would like to return home w/ partner Sarah at d/c    Anticipated Changes Related to Illness none    Equipment Needed After Discharge none               Discharge Plan     Row Name 04/08/22 1112       Plan    Plan Introduced self and explained role of CCP. PPE used. Info on facesheet verified    Plan Comments Lives w/ partner Sarah in a house and would like to return home at discharge. Using straight cane. [facility]  PT already arranged per partner. Partner assists w/ care as needed. Partner will provide transport at d/c. Denies any financial needs. Will follow              Continued Care and Services - Admitted Since 4/8/2022    Coordination has not been started for this encounter.          Demographic Summary    No documentation.                Functional Status    No documentation.                Psychosocial    No documentation.                Abuse/Neglect    No documentation.                Legal    No documentation.                 Substance Abuse    No documentation.                Patient Forms    No documentation.                   Kinjal Cummings RN

## 2022-04-08 NOTE — ED PROVIDER NOTES
EMERGENCY DEPARTMENT ENCOUNTER    Room Number:  05/05  Date of encounter:  4/8/2022  PCP: Annie Romero MD  Historian: Patient      HPI:  Chief Complaint: Cough   A complete HPI/ROS/PMH/PSH/SH/FH are unobtainable due to: AMS    Context: Jaylyn Fuentes is a 72 y.o. female with past medical history of HTN, HLD, SIN, and GERD who presents to the ED c/o cough.  Patient had a revision of a hip replacement performed at [facility] 2 days ago on 4/5/2022 and since going home the day after she has had a nonproductive cough with gagging, increased confusion, difficulty with speech, and nausea.  Denies any fever, chills, sweats, chest pain, or shortness of breath.  Has not had any hemoptysis or productive sputum.      PAST MEDICAL HISTORY  Active Ambulatory Problems     Diagnosis Date Noted   • Osteoarthrosis, localized, primary, shoulder region 02/13/2017   • Hypertension 02/13/2017   • Depressed 02/13/2017   • Status post total replacement of left shoulder 02/13/2017   • Duodenitis 09/27/2021   • Abnormal CT scan, small bowel 09/27/2021   • Nausea and vomiting 09/27/2021   • SIN (obstructive sleep apnea) 09/30/2021   • Snoring 09/30/2021   • Hypersomnia 09/30/2021   • Non-restorative sleep 09/30/2021   • Class 2 obesity 09/30/2021   • Poor memory 09/30/2021   • Frequent falls 10/22/2021   • Migraine headache 10/26/2021     Resolved Ambulatory Problems     Diagnosis Date Noted   • Vascular dementia with behavior disturbance (HCC) 10/26/2021     Past Medical History:   Diagnosis Date   • Abdominal pain    • Abnormal CT of the abdomen    • Abnormal glucose    • Abnormal liver enzymes    • Abnormal urinalysis    • Achilles tendinitis    • Acid reflux disease    • Acromioclavicular joint arthritis    • Acromioclavicular joint pain, bilateral    • Aftercare following knee joint replacement surgery    • Alcohol abuse    • Alternating constipation and diarrhea    • Anemia    • Arthralgia of multiple sites    •  Arthritis    • BMI 34.0-34.9,adult    • Carpal tunnel syndrome    • Cataract    • Cellulitis of extremity    • Chronic insomnia    • Chronic kidney disease, stage III (moderate) ([facility])    • Contact dermatitis    • Contusion of bone    • Cough    • Decreased range of motion    • Degeneration of cervical intervertebral disc    • Dermatitis    • Diarrhea    • Dry eye    • Dysphagia    • Edema of extremities    • Esophagitis    • Fatigue    • Flu vaccine need    • GERD (gastroesophageal reflux disease)    • Glenohumeral arthritis, left    • Hemorrhoids    • Hiatal hernia    • Hip pain    • History of colonic polyps    • History of DVT in adulthood    • History of transfusion    • Hyperactivity of bladder    • Hyperlipidemia    • Hypokalemia    • Irritable bowel syndrome with diarrhea    • Lactose intolerance    • Left ankle pain    • Leg cramps    • Leg swelling    • Limitation of joint motion of ankle, left    • Lower extremity tendinopathy    • Medicare annual wellness visit, subsequent    • Methicillin resistant Staphylococcus aureus infection    • Microscopic hematuria    • Migraine    • Obstructive sleep apnea    • Osteoarthritis, chronic    • Pain, foot, left, chronic    • Pancreatitis, chronic ([facility])    • Pes planus    • PONV (postoperative nausea and vomiting)    • Rectal leakage    • Retrocalcaneal bursitis    • Right ankle instability    • Right ankle pain    • Right ankle sprain    • Right ankle swelling    • Rotator cuff tear, left    • S/P shoulder surgery    • Shoulder region pain    • Spinal stenosis    • Swallowing difficulty    • Urge incontinence of urine    • Urinary incontinence    • Visit for screening mammogram    • Wears contact lenses    • Wears dentures    • Wears hearing aid    • Weight gain          PAST SURGICAL HISTORY  Past Surgical History:   Procedure Laterality Date   • BACK SURGERY      LUMBAR FUSION    • CERVICAL FUSION      plates, rods, and screws   • CHOLECYSTECTOMY     • COLONOSCOPY       2 YEARS AGO    • ENDOSCOPY N/A 10/14/2021    Procedure: ESOPHAGOGASTRODUODENOSCOPY with biopsy and polypectomy;  Surgeon: Hussain Basilio MD;  Location: [facility] OR;  Service: Gastroenterology;  Laterality: N/A;  gastritis and polyps   • EYE SURGERY      LASER FOR KERITONOSIS    • GALLBLADDER SURGERY     • HERNIA REPAIR     • HYSTERECTOMY     • INGUINAL HERNIA REPAIR     • JOINT REPLACEMENT      BILATERAL KNEES, BILATERAL HIPS   • NECK SURGERY     • NM RECONSTR TOTAL SHOULDER IMPLANT Left 2/13/2017    Procedure: TOTAL SHOULDER REPLACEMENT LEFT;  Surgeon: Benigno Chavez MD;  Location[facility] OR;  Service: Orthopedics   • REPLACEMENT TOTAL KNEE     • SHOULDER LIGAMENT REPAIR Right    • TONSILLECTOMY     • TOTAL HIP ARTHROPLASTY     • UPPER GASTROINTESTINAL ENDOSCOPY           FAMILY HISTORY  Family History   Problem Relation Age of Onset   • Arthritis Other    • Colonic polyp Other    • Coronary artery disease Other          SOCIAL HISTORY  Social History     Socioeconomic History   • Marital status: Single   Tobacco Use   • Smoking status: Former Smoker   • Smokeless tobacco: Never Used   Vaping Use   • Vaping Use: Never used   Substance and Sexual Activity   • Alcohol use: Yes     Comment: [employer] SOCIALLY    • Drug use: No   • Sexual activity: Defer         ALLERGIES  Baclofen, Keflex [cephalexin], Melatonin, and Sulfa antibiotics        REVIEW OF SYSTEMS  Review of Systems     All systems reviewed and negative except for those discussed in HPI.       PHYSICAL EXAM    I have reviewed the triage vital signs and nursing notes.    ED Triage Vitals [04/07/22 2344]   Temp Heart Rate Resp BP SpO2   99.1 °F (37.3 °C) 99 18 -- 97 %      Temp src Heart Rate Source Patient Position BP Location FiO2 (%)   Oral -- -- -- --       Physical Exam  GENERAL: Alert, not distressed  HENT: dry mucous membranes  EYES: no scleral icterus, PERRL, EOMI  CV: regular rhythm, tachycardic  RESPIRATORY: decreased breath  sounds bilaterally, no wheezes, rhonchi, or rales heard  ABDOMEN: soft/nontender  MUSCULOSKELETAL: no deformity, tender to palpate at the left hip, bandage in place  NEURO: alert, moves all extremities, speech is slow and patient has difficulty answering in complete sentences, no dysarthria, no facial droop, no focal neuro deficits, follows commands  SKIN: warm, dry, no evidence of cellulitis at the patient's incision      LAB RESULTS  Recent Results (from the past 24 hour(s))   ECG 12 Lead    Collection Time: 04/08/22  1:51 AM   Result Value Ref Range    QT Interval 382 ms   Comprehensive Metabolic Panel    Collection Time: 04/08/22  2:01 AM    Specimen: Blood   Result Value Ref Range    Glucose 120 (H) 65 - 99 mg/dL    BUN 10 8 - 23 mg/dL    Creatinine 0.75 0.57 - 1.00 mg/dL    Sodium 140 136 - 145 mmol/L    Potassium 3.7 3.5 - 5.2 mmol/L    Chloride 102 98 - 107 mmol/L    CO2 26.3 22.0 - 29.0 mmol/L    Calcium 9.0 8.6 - 10.5 mg/dL    Total Protein 7.0 6.0 - 8.5 g/dL    Albumin 3.90 3.50 - 5.20 g/dL    ALT (SGPT) 11 1 - 33 U/L    AST (SGOT) 33 (H) 1 - 32 U/L    Alkaline Phosphatase 79 39 - 117 U/L    Total Bilirubin 0.8 0.0 - 1.2 mg/dL    Globulin 3.1 gm/dL    A/G Ratio 1.3 g/dL    BUN/Creatinine Ratio 13.3 7.0 - 25.0    Anion Gap 11.7 5.0 - 15.0 mmol/L    eGFR 84.2 >60.0 mL/min/1.73   Protime-INR    Collection Time: 04/08/22  2:01 AM    Specimen: Arm, Right; Blood   Result Value Ref Range    Protime 13.8 11.7 - 14.2 Seconds    INR 1.07 0.90 - 1.10   BNP    Collection Time: 04/08/22  2:01 AM    Specimen: Blood   Result Value Ref Range    proBNP 527.0 0.0 - 900.0 pg/mL   Troponin    Collection Time: 04/08/22  2:01 AM    Specimen: Blood   Result Value Ref Range    Troponin T <0.010 0.000 - 0.030 ng/mL   Lactic Acid, Plasma    Collection Time: 04/08/22  2:01 AM    Specimen: Blood   Result Value Ref Range    Lactate 1.3 0.5 - 2.0 mmol/L   Procalcitonin    Collection Time: 04/08/22  2:01 AM    Specimen: Blood   Result  Value Ref Range    Procalcitonin 0.03 0.00 - 0.25 ng/mL   CBC Auto Differential    Collection Time: 04/08/22  2:01 AM    Specimen: Blood   Result Value Ref Range    WBC 10.09 3.40 - 10.80 10*3/mm3    RBC 3.77 3.77 - 5.28 10*6/mm3    Hemoglobin 11.6 (L) 12.0 - 15.9 g/dL    Hematocrit 33.4 (L) 34.0 - 46.6 %    MCV 88.6 79.0 - 97.0 fL    MCH 30.8 26.6 - 33.0 pg    MCHC 34.7 31.5 - 35.7 g/dL    RDW 13.6 12.3 - 15.4 %    RDW-SD 44.3 37.0 - 54.0 fl    MPV 9.1 6.0 - 12.0 fL    Platelets 228 140 - 450 10*3/mm3    Neutrophil % 79.3 (H) 42.7 - 76.0 %    Lymphocyte % 10.7 (L) 19.6 - 45.3 %    Monocyte % 8.2 5.0 - 12.0 %    Eosinophil % 1.0 0.3 - 6.2 %    Basophil % 0.3 0.0 - 1.5 %    Immature Grans % 0.5 0.0 - 0.5 %    Neutrophils, Absolute 8.00 (H) 1.70 - 7.00 10*3/mm3    Lymphocytes, Absolute 1.08 0.70 - 3.10 10*3/mm3    Monocytes, Absolute 0.83 0.10 - 0.90 10*3/mm3    Eosinophils, Absolute 0.10 0.00 - 0.40 10*3/mm3    Basophils, Absolute 0.03 0.00 - 0.20 10*3/mm3    Immature Grans, Absolute 0.05 0.00 - 0.05 10*3/mm3    nRBC 0.0 0.0 - 0.2 /100 WBC   Respiratory Panel PCR w/COVID-19(SARS-CoV-2) THERESA/ROBERT/MATHEUS/PAD/COR/MAD/PEPE In-House, NP Swab in [facility] 3-4 HR TAT - Swab, Nasopharynx    Collection Time: 04/08/22  2:10 AM    Specimen: Nasopharynx; Swab   Result Value Ref Range    ADENOVIRUS, PCR Not Detected Not Detected    Coronavirus 229E Not Detected Not Detected    Coronavirus HKU1 Not Detected Not Detected    Coronavirus NL63 Not Detected Not Detected    Coronavirus OC43 Not Detected Not Detected    COVID19 Not Detected Not Detected - Ref. Range    Human Metapneumovirus Not Detected Not Detected    Human Rhinovirus/Enterovirus Not Detected Not Detected    Influenza A PCR Not Detected Not Detected    Influenza B PCR Not Detected Not Detected    Parainfluenza Virus 1 Not Detected Not Detected    Parainfluenza Virus 2 Not Detected Not Detected    Parainfluenza Virus 3 Not Detected Not Detected    Parainfluenza Virus 4 Not  Detected Not Detected    RSV, PCR Not Detected Not Detected    Bordetella pertussis pcr Not Detected Not Detected    Bordetella parapertussis PCR Not Detected Not Detected    Chlamydophila pneumoniae PCR Not Detected Not Detected    Mycoplasma pneumo by PCR Not Detected Not Detected   Urinalysis With Microscopic If Indicated (No Culture) - Urine, Catheter    Collection Time: 04/08/22  4:56 AM    Specimen: Urine, Catheter   Result Value Ref Range    Color, UA Yellow Yellow, Straw    Appearance, UA Clear Clear    pH, UA 7.5 5.0 - 8.0    Specific Gravity, UA >=1.030 1.005 - 1.030    Glucose, UA Negative Negative    Ketones, UA 15 mg/dL (1+) (A) Negative    Bilirubin, UA Negative Negative    Blood, UA Small (1+) (A) Negative    Protein, UA Negative Negative    Leuk Esterase, UA Negative Negative    Nitrite, UA Negative Negative    Urobilinogen, UA 0.2 E.U./dL 0.2 - 1.0 E.U./dL   Urinalysis, Microscopic Only - Urine, Catheter    Collection Time: 04/08/22  4:56 AM    Specimen: Urine, Catheter   Result Value Ref Range    RBC, UA 6-12 (A) None Seen, 0-2 /HPF    WBC, UA 0-2 None Seen, 0-2 /HPF    Bacteria, UA None Seen None Seen /HPF    Squamous Epithelial Cells, UA 0-2 None Seen, 0-2 /HPF    Hyaline Casts, UA None Seen None Seen /LPF    Methodology Automated Microscopy        Ordered the above labs and independently reviewed the results.        RADIOLOGY  CT Head Without Contrast    Result Date: 4/8/2022  CT HEAD WITHOUT CONTRAST HISTORY: altered mental status   COMPARISON: 10/22/2021  TECHNIQUE: Axial CT imaging was obtained through the brain. No IV contrast was administered.  FINDINGS: No acute intracranial hemorrhage is seen. There is atrophy. There is periventricular and deep white matter microangiopathic change. There is no midline shift or mass effect. Mucosal thickening is noted within the paranasal sinuses.      No acute intracranial findings.  Radiation dose reduction techniques were utilized, including automated  exposure control and exposure modulation based on body size.  This report was finalized on 4/8/2022 3:03 AM by Dr. Saba Armijo M.D.      XR Chest 1 View    Result Date: 4/8/2022  SINGLE VIEW OF THE CHEST  HISTORY: Shortness of air  COMPARISON: 10/22/2021  FINDINGS:. Cardiomegaly is present. There is no vascular congestion. No pneumothorax or pleural effusion is seen. There is tortuosity of the aorta. Patient is status post left shoulder arthroplasty.       No acute findings.  This report was finalized on 4/8/2022 2:11 AM by Dr. Saba Armijo M.D.      CT Angiogram Chest    Result Date: 4/8/2022  CT ANGIOGRAM OF THE CHEST  HISTORY: Cough and shortness of breath  COMPARISON: None available.  TECHNIQUE: Axial CT imaging was obtained through the thorax. IV contrast was administered. Three-D reformatted images were obtained.  FINDINGS: No acute pulmonary thromboembolus is seen. There is enlargement of the main pulmonary artery, which can be seen in the setting of pulmonary arterial hypertension. Thoracic aorta is normal in caliber. There is no evidence of dissection. There is calcification of the thoracic aorta and coronary arteries. There is mild dependent atelectasis. Mediastinal lymph nodes do not appear pathologically enlarged. Images through the upper abdomen do not demonstrate any acute abnormalities. No acute osseous abnormalities are seen. There are changes of prior thoracolumbar spinal fusion.      No acute intrathoracic findings.  Radiation dose reduction techniques were utilized, including automated exposure control and exposure modulation based on body size.  This report was finalized on 4/8/2022 4:59 AM by Dr. Saba Armijo M.D.        I ordered the above noted radiological studies. Reviewed by me and discussed with radiologist.  See dictation for official radiology interpretation.      PROCEDURES    Procedures      MEDICATIONS GIVEN IN ER    Medications   sodium chloride 0.9 % flush 10 mL (10  mL Intravenous Given 4/8/22 0318)   sodium chloride 0.9 % flush 10 mL (has no administration in time range)   nitroglycerin (NITROSTAT) SL tablet 0.4 mg (has no administration in time range)   acetaminophen (TYLENOL) tablet 650 mg (has no administration in time range)   ondansetron (ZOFRAN) tablet 4 mg (has no administration in time range)     Or   ondansetron (ZOFRAN) injection 4 mg (has no administration in time range)   aluminum-magnesium hydroxide-simethicone (MAALOX MAX) 400-400-40 MG/5ML suspension 15 mL (has no administration in time range)   sodium chloride 0.9 % bolus 500 mL (0 mL Intravenous Stopped 4/8/22 0426)   ondansetron (ZOFRAN) injection 4 mg (4 mg Intravenous Given 4/8/22 0205)   ondansetron (ZOFRAN) injection 4 mg (4 mg Intravenous Given 4/8/22 0317)   metoclopramide (REGLAN) injection 5 mg (5 mg Intravenous Given 4/8/22 0452)   iopamidol (ISOVUE-370) 76 % injection 100 mL (85 mL Intravenous Given by Other 4/8/22 0442)         PROGRESS, DATA ANALYSIS, CONSULTS, AND MEDICAL DECISION MAKING    All labs have been independently reviewed by me.  All radiology studies have been reviewed by me and discussed with radiologist dictating the report.   EKG's independently viewed and interpreted by me.  Discussion below represents my analysis of pertinent findings related to patient's condition, differential diagnosis, treatment plan and final disposition.    DDx: Includes but not limited to atelectasis, pneumonia, PE, polypharmacy, dementia, UTI, hyponatremia    ED Course as of 04/08/22 0542   Fri Apr 08, 2022   0158 EKG          EKG time: 01:51  Rhythm/Rate: Sinus rhythm at 85 bpm, atrial premature complex  P waves and SC: Normal  QRS, axis: Normal  ST and T waves: No acute ST/T wave changes    Interpreted Contemporaneously by me, independently viewed  Not significantly changed compared to prior EKG of 9/15/2021.   [YENI]   0303 WBC: 10.09 [YENI]   0303 Hemoglobin(!): 11.6 [YENI]   0303 Hematocrit(!): 33.4 [YENI]    0303 Platelets: 228 [YENI]   0303 Lactate: 1.3 [YENI]   0303 Glucose(!): 120 [YENI]   0303 BUN: 10 [YENI]   0303 Creatinine: 0.75 [YENI]   0303 Sodium: 140 [YENI]   0303 Potassium: 3.7 [YENI]   0303 Chloride: 102 [YENI]   0303 CO2: 26.3 [YENI]   0303 Procalcitonin: 0.03 [YENI]   0303 Troponin T: <0.010 [YENI]   0303 proBNP: 527.0 [YENI]   0518 Patient history, presentation, and ER evaluation discussed with Ruel BOWMAN, will place in observation to a telemetry bed per Dr. Davila. [YENI]   0526 WBC, UA: 0-2 [YENI]   0526 Bacteria, UA: None Seen [YENI]      ED Course User Index  [YENI] Roverto Avery PA       MDM: Patient's ER evaluation was unremarkable including a negative cardiac work-up, a normal CT scan of the head, blood work, urine, and CT of the chest to rule out pulmonary emboli.  There is no evidence of evidence for an acute intracranial abnormality, infectious process, or metabolic process.  Suspect the patient's altered mental status is a combination of underlying dementia in the setting of recent surgery, anesthesia, and opiate use for pain control after surgery.  Patient's mental status has not returned to baseline so she will be kept in the hospital for monitoring and further evaluation.    PPE: The patient wore a surgical mask throughout the entire patient encounter. I wore an N95.    AS OF 05:42 EDT VITALS:    BP - 142/97  HR - 86  TEMP - 99.1 °F (37.3 °C) (Oral)  O2 SATS - 98%        DIAGNOSIS  Final diagnoses:   Altered mental status, unspecified altered mental status type   Cough   Primary hypertension   Hyperlipidemia, unspecified hyperlipidemia type   Gastroesophageal reflux disease, unspecified whether esophagitis present   History of revision of total hip arthroplasty         DISPOSITION  ADMISSION    Discussed treatment plan and reason for admission with pt/family and admitting physician.  Pt/family voiced understanding of the plan for admission for further testing/treatment as needed.                  Roverto Avery  WAYLON BEY  04/08/22 0542

## 2022-04-08 NOTE — PROGRESS NOTES
Clinical Pharmacy Services: Medication History    Jaylyn Fuentes is a 72 y.o. female presenting to [facility] for   Chief Complaint   Patient presents with   • Cough       She  has a past medical history of Abdominal pain, Abnormal CT of the abdomen, Abnormal glucose, Abnormal liver enzymes, Abnormal urinalysis, Achilles tendinitis, Acid reflux disease, Acromioclavicular joint arthritis, Acromioclavicular joint pain, bilateral, Aftercare following knee joint replacement surgery, Alcohol abuse, Alternating constipation and diarrhea, Anemia, Arthralgia of multiple sites, Arthritis, BMI 34.0-34.9,adult, Carpal tunnel syndrome, Cataract, Cellulitis of extremity, Chronic insomnia, Chronic kidney disease, stage III (moderate) ([facility]), Contact dermatitis, Contusion of bone, Cough, Decreased range of motion, Degeneration of cervical intervertebral disc, Depressed, Dermatitis, Diarrhea, Dry eye, Dysphagia, Edema of extremities, Esophagitis, Fatigue, Flu vaccine need, GERD (gastroesophageal reflux disease), Glenohumeral arthritis, left, Hemorrhoids, Hiatal hernia, Hip pain, History of colonic polyps, History of DVT in adulthood, History of transfusion, Hyperactivity of bladder, Hyperlipidemia, Hypertension, Hypokalemia, Irritable bowel syndrome with diarrhea, Lactose intolerance, Left ankle pain, Leg cramps, Leg swelling, Limitation of joint motion of ankle, left, Lower extremity tendinopathy, Medicare annual wellness visit, subsequent, Methicillin resistant Staphylococcus aureus infection, Microscopic hematuria, Migraine, Obstructive sleep apnea, Osteoarthritis, chronic, Pain, foot, left, chronic, Pancreatitis, chronic (HCC), Pes planus, PONV (postoperative nausea and vomiting), Rectal leakage, Retrocalcaneal bursitis, Right ankle instability, Right ankle pain, Right ankle sprain, Right ankle swelling, Rotator cuff tear, left, S/P shoulder surgery, Shoulder region pain, Spinal stenosis, Swallowing  difficulty, Urge incontinence of urine, Urinary incontinence, Visit for screening mammogram, Wears contact lenses, Wears dentures, Wears hearing aid, and Weight gain.    Allergies as of 04/07/2022 - Reviewed 04/07/2022   Allergen Reaction Noted   • Baclofen Other (See Comments) 06/06/2020   • Keflex [cephalexin] GI Intolerance 02/12/2017   • Melatonin Confusion 12/03/2021   • Sulfa antibiotics Rash 02/12/2017       Medication information was obtained from: Spouse/Pharmacy  Pharmacy and Phone Number:   [id number] - [place], [place] - [address] [address] [facility] - [phone]  - [phone]   [address].  [facility] [address]  Phone: [phone] Fax: [phone]        Prior to Admission Medications     Prescriptions Last Dose Informant Patient Reported? Taking?    albuterol sulfate  (90 Base) MCG/ACT inhaler  Spouse/Significant Other Yes Yes    Inhale 2 puffs Every 6 (Six) Hours As Needed.    Bismuth 262 MG chewable tablet  Spouse/Significant Other No Yes    Chew 2 tablets 4 (Four) Times a Day.    calcium carbonate-cholecalciferol 500-400 MG-UNIT tablet tablet  Spouse/Significant Other Yes Yes    Take 1 tablet by mouth Daily.    cetirizine (zyrTEC) 10 MG tablet  Spouse/Significant Other Yes Yes    Take 10 mg by mouth Daily.    dexlansoprazole (Dexilant) 60 MG capsule  Spouse/Significant Other Yes Yes    Take 1 capsule by mouth Daily.    diphenoxylate-atropine (LOMOTIL) 2.5-0.025 MG per tablet  Pharmacy Yes Yes    Take 1 tablet by mouth 4 (Four) Times a Day As Needed for diarrhea.    fesoterodine fumarate (TOVIAZ ER) 8 MG tablet sustained-release 24 hour capsule  Pharmacy Yes Yes    Take 8 mg by mouth Daily.    HYDROcodone-acetaminophen (NORCO)  MG per tablet  Pharmacy Yes Yes    Take 1 tablet by mouth Every 8 (Eight) Hours As Needed.    lisinopril (PRINIVIL,ZESTRIL) 20 MG tablet  Pharmacy Yes Yes    Take 20 mg by mouth 2 (Two) Times a Day.    OLANZapine (zyPREXA)  2.5 MG tablet  Pharmacy Yes Yes    Take 2.5 mg by mouth Every Night.    ondansetron (ZOFRAN) 8 MG tablet  Spouse/Significant Other No Yes    Take 1 tablet by mouth Every 8 (Eight) Hours As Needed for Vomiting.    pregabalin (LYRICA) 150 MG capsule  Pharmacy Yes Yes    Take 150 mg by mouth Every Evening.    pregabalin (LYRICA) 150 MG capsule  Pharmacy Yes Yes    Take 300 mg by mouth Every Morning.    rosuvastatin (CRESTOR) 10 MG tablet   Yes Yes    Take 10 mg by mouth Daily.    SUMAtriptan (IMITREX) 5 MG/ACT nasal spray  Pharmacy Yes Yes    5 mg into the nostril(s) as directed by provider Every 2 (Two) Hours As Needed.    tolterodine (DETROL) 1 MG tablet  Pharmacy Yes Yes    Take 1 mg by mouth 2 (Two) Times a Day.    vilazodone (VIIBRYD) 40 MG tablet tablet  Pharmacy Yes Yes    Take 40 mg by mouth Daily.    ZOLMitriptan (ZOMIG) 5 MG tablet  Pharmacy Yes Yes    Take 5 mg by mouth 1 (One) Time As Needed for migraine.            Medication notes:     This medication list is complete to the best of my knowledge as of 4/8/2022    Please call if questions.    Terry De La Fuente  Medication History Technician  [phone]    4/8/2022 10:02 EDT

## 2022-04-08 NOTE — H&P
"    Patient Name:  Jaylyn Fuentes  YOB: 1950  MRN:  [id number]  Admit Date:  4/8/2022  Patient Care Team:  Annie Romero MD as PCP - General (Internal Medicine)  Barbara Vidal APRN as Nurse Practitioner (Nurse Practitioner)      Subjective   History Present Illness     Chief Complaint   Patient presents with   • Cough       History of Present Illness  Ms. Fuentes is a 72 y.o. with a history of migraines and chronic pain on prescription opiates who presented with poor p.o. intake and altered mental status.  She is currently oriented to person only.  Says she is here because she \"can't remember this, that and the other\".  History is obtained from her partner, Sarah who is at bedside.    Ms. Fuentes had a hip surgery 3 days prior at [facility].  Since returning home, she has complained of pain and nausea.  This has resulted in poor p.o. intake.  Sarah denies vomiting, any overt aspiration, coughing, fevers.  She denies head trauma, falls, slurred speech, facial droop, focal weakness.  Ms. Fuentes has had increasing difficulty completing her sentences and increasing disorientation over the last day.  Sarah reports she has had similar adverse reactions to opiates following surgery in the past.  She was discharged from [facility] with oxycodone and tramadol.  She takes chronic prescription Norco, followed by pain management.    Review of Systems   Constitutional: Positive for appetite change. Negative for chills and fever.   HENT: Negative for sore throat and trouble swallowing.    Eyes: Negative for visual disturbance.   Respiratory: Negative for cough, choking and shortness of breath.    Cardiovascular: Negative for chest pain, palpitations and leg swelling.   Gastrointestinal: Negative for abdominal pain, diarrhea, nausea and vomiting.   Genitourinary: Negative for difficulty urinating, dysuria and hematuria.   Musculoskeletal: Negative for neck pain and neck stiffness.   Skin: Negative for rash " and wound.   Neurological: Negative for syncope, facial asymmetry and numbness.   Psychiatric/Behavioral: Positive for confusion. Negative for hallucinations.        Personal History     Past Medical History:   Diagnosis Date   • Abdominal pain    • Abnormal CT of the abdomen    • Abnormal glucose    • Abnormal liver enzymes    • Abnormal urinalysis    • Achilles tendinitis     left   • Acid reflux disease    • Acromioclavicular joint arthritis    • Acromioclavicular joint pain, bilateral    • Aftercare following knee joint replacement surgery    • Alcohol abuse    • Alternating constipation and diarrhea    • Anemia    • Arthralgia of multiple sites    • Arthritis    • BMI 34.0-34.9,adult    • Carpal tunnel syndrome    • Cataract    • Cellulitis of extremity    • Chronic insomnia    • Chronic kidney disease, stage III (moderate) ([facility])    • Contact dermatitis    • Contusion of bone    • Cough    • Decreased range of motion    • Degeneration of cervical intervertebral disc    • Depressed    • Dermatitis    • Diarrhea    • Dry eye    • Dysphagia    • Edema of extremities    • Esophagitis    • Fatigue    • Flu vaccine need    • GERD (gastroesophageal reflux disease)    • Glenohumeral arthritis, left    • Hemorrhoids    • Hiatal hernia    • Hip pain    • History of colonic polyps    • History of DVT in adulthood    • History of transfusion    • Hyperactivity of bladder    • Hyperlipidemia    • Hypertension    • Hypokalemia    • Irritable bowel syndrome with diarrhea    • Lactose intolerance    • Left ankle pain    • Leg cramps    • Leg swelling    • Limitation of joint motion of ankle, left    • Lower extremity tendinopathy    • Medicare annual wellness visit, subsequent    • Methicillin resistant Staphylococcus aureus infection    • Microscopic hematuria    • Migraine    • Obstructive sleep apnea    • Osteoarthritis, chronic    • Pain, foot, left, chronic    • Pancreatitis, chronic (HCC)    • Pes planus    • PONV  (postoperative nausea and vomiting)     SCOPOLAMINE HELPS-USED DURING LAST SURGERY    • Rectal leakage    • Retrocalcaneal bursitis    • Right ankle instability    • Right ankle pain    • Right ankle sprain    • Right ankle swelling    • Rotator cuff tear, left    • S/P shoulder surgery    • Shoulder region pain    • Spinal stenosis    • Swallowing difficulty    • Urge incontinence of urine    • Urinary incontinence    • Visit for screening mammogram    • Wears contact lenses    • Wears dentures     UPPER AND LOWER    • Wears hearing aid     DOESN'T HAVE WITH HER   • Weight gain      Past Surgical History:   Procedure Laterality Date   • BACK SURGERY      LUMBAR FUSION    • CERVICAL FUSION      plates, rods, and screws   • CHOLECYSTECTOMY     • COLONOSCOPY      2 YEARS AGO    • ENDOSCOPY N/A 10/14/2021    Procedure: ESOPHAGOGASTRODUODENOSCOPY with biopsy and polypectomy;  Surgeon: Hussain Basilio MD;  Location: [facility] OR;  Service: Gastroenterology;  Laterality: N/A;  gastritis and polyps   • EYE SURGERY      LASER FOR KERITONOSIS    • GALLBLADDER SURGERY     • HERNIA REPAIR     • HYSTERECTOMY     • INGUINAL HERNIA REPAIR     • JOINT REPLACEMENT      BILATERAL KNEES, BILATERAL HIPS   • NECK SURGERY     • AK RECONSTR TOTAL SHOULDER IMPLANT Left 2/13/2017    Procedure: TOTAL SHOULDER REPLACEMENT LEFT;  Surgeon: Benigno Chavez MD;  Location: [facility] OR;  Service: Orthopedics   • REPLACEMENT TOTAL KNEE     • SHOULDER LIGAMENT REPAIR Right    • TONSILLECTOMY     • TOTAL HIP ARTHROPLASTY     • UPPER GASTROINTESTINAL ENDOSCOPY       Family History   Problem Relation Age of Onset   • Arthritis Other    • Colonic polyp Other    • Coronary artery disease Other      Social History     Tobacco Use   • Smoking status: Former Smoker   • Smokeless tobacco: Never Used   Vaping Use   • Vaping Use: Never used   Substance Use Topics   • Alcohol use: Yes     Comment: [employer]IALLY    • Drug use: No     No current  facility-administered medications on file prior to encounter.     Current Outpatient Medications on File Prior to Encounter   Medication Sig Dispense Refill   • albuterol sulfate  (90 Base) MCG/ACT inhaler Inhale 2 puffs Every 6 (Six) Hours As Needed.     • Bismuth 262 MG chewable tablet Chew 2 tablets 4 (Four) Times a Day. 112 tablet 0   • calcium carbonate-cholecalciferol 500-400 MG-UNIT tablet tablet Take 1 tablet by mouth Daily.     • cetirizine (zyrTEC) 10 MG tablet Take 10 mg by mouth Daily.     • dexlansoprazole (Dexilant) 60 MG capsule Take 1 capsule by mouth Daily.     • diphenoxylate-atropine (LOMOTIL) 2.5-0.025 MG per tablet Take 1 tablet by mouth 4 (Four) Times a Day As Needed for diarrhea.     • fesoterodine fumarate (TOVIAZ ER) 8 MG tablet sustained-release 24 hour capsule Take 8 mg by mouth Daily.     • HYDROcodone-acetaminophen (NORCO)  MG per tablet Take 1 tablet by mouth Every 8 (Eight) Hours As Needed.     • lisinopril (PRINIVIL,ZESTRIL) 20 MG tablet Take 20 mg by mouth 2 (Two) Times a Day.     • OLANZapine (zyPREXA) 2.5 MG tablet Take 2.5 mg by mouth Every Night.     • ondansetron (ZOFRAN) 8 MG tablet Take 1 tablet by mouth Every 8 (Eight) Hours As Needed for Vomiting. 15 tablet 0   • pregabalin (LYRICA) 150 MG capsule Take 300 mg by mouth Every Morning.     • pregabalin (LYRICA) 150 MG capsule Take 150 mg by mouth Every Evening.     • rosuvastatin (CRESTOR) 10 MG tablet Take 10 mg by mouth Daily.     • SUMAtriptan (IMITREX) 5 MG/ACT nasal spray 5 mg into the nostril(s) as directed by provider Every 2 (Two) Hours As Needed.     • tolterodine (DETROL) 1 MG tablet Take 1 mg by mouth 2 (Two) Times a Day.     • vilazodone (VIIBRYD) 40 MG tablet tablet Take 40 mg by mouth Daily.     • ZOLMitriptan (ZOMIG) 5 MG tablet Take 5 mg by mouth 1 (One) Time As Needed for migraine.     • [DISCONTINUED] pravastatin (PRAVACHOL) 20 MG tablet Take 20 mg by mouth Daily.       Allergies   Allergen  "Reactions   • Baclofen Other (See Comments)     \"makes me crazy\"   • Keflex [Cephalexin] GI Intolerance   • Melatonin Confusion   • Sulfa Antibiotics Rash     SKIN BRIGHT RED        Objective    Objective     Vital Signs  Temp:  [99.1 °F (37.3 °C)] 99.1 °F (37.3 °C)  Heart Rate:  [82-99] 84  Resp:  [18] 18  BP: (142-192)/(76-97) 165/87  SpO2:  [91 %-99 %] 95 %  on  Flow (L/min):  [2] 2;   Device (Oxygen Therapy): nasal cannula  Body mass index is 37.46 kg/m².    Physical Exam  Constitutional:       General: She is not in acute distress.     Appearance: She is obese. She is not diaphoretic.   HENT:      Head: Normocephalic and atraumatic.      Mouth/Throat:      Mouth: Mucous membranes are moist.   Eyes:      General: No scleral icterus.  Cardiovascular:      Rate and Rhythm: Normal rate and regular rhythm.      Pulses: Normal pulses.      Heart sounds: Murmur (3/6 holosystolic) heard.     No gallop.   Pulmonary:      Effort: Pulmonary effort is normal. No respiratory distress.      Breath sounds: No wheezing or rhonchi.   Abdominal:      Palpations: Abdomen is soft.      Tenderness: There is no abdominal tenderness. There is no guarding.   Musculoskeletal:      Right lower leg: No edema.      Left lower leg: No edema.   Skin:     General: Skin is warm and dry.   Neurological:      General: No focal deficit present.      Mental Status: She is alert. She is disoriented.      Cranial Nerves: No cranial nerve deficit.      Comments: Oriented to person, can confirm that she is in the hospital when given multiple choice options.  Says the year is \"19 something\"         Results Review:  I reviewed the patient's new clinical results.  I reviewed the patient's new imaging results and agree with the interpretation.  I reviewed the patient's other test results and agree with the interpretation  I personally viewed and interpreted the patient's EKG/Telemetry data  Discussed with ED provider.    Lab Results (last 24 hours)     " Procedure Component Value Units Date/Time    CBC & Differential [[id number]]  (Abnormal) Collected: 04/08/22 0201    Specimen: Blood Updated: 04/08/22 0213    Narrative:      The following orders were created for panel order CBC & Differential.  Procedure                               Abnormality         Status                     ---------                               -----------         ------                     CBC Auto Differential[[id number]]        Abnormal            Final result                 Please view results for these tests on the individual orders.    Comprehensive Metabolic Panel [[id number]]  (Abnormal) Collected: 04/08/22 0201    Specimen: Blood Updated: 04/08/22 0235     Glucose 120 mg/dL      BUN 10 mg/dL      Creatinine 0.75 mg/dL      Sodium 140 mmol/L      Potassium 3.7 mmol/L      Chloride 102 mmol/L      CO2 26.3 mmol/L      Calcium 9.0 mg/dL      Total Protein 7.0 g/dL      Albumin 3.90 g/dL      ALT (SGPT) 11 U/L      AST (SGOT) 33 U/L      Alkaline Phosphatase 79 U/L      Total Bilirubin 0.8 mg/dL      Globulin 3.1 gm/dL      A/G Ratio 1.3 g/dL      BUN/Creatinine Ratio 13.3     Anion Gap 11.7 mmol/L      eGFR 84.2 mL/min/1.73      Comment: National Kidney Foundation and American Society of Nephrology (ASN) Task Force recommended calculation based on the Chronic Kidney Disease Epidemiology Collaboration (CKD-EPI) equation refit without adjustment for race.       Narrative:      GFR Normal >60  Chronic Kidney Disease <60  Kidney Failure <15      Protime-INR [[id number]]  (Normal) Collected: 04/08/22 0201    Specimen: Blood from Arm, Right Updated: 04/08/22 0232     Protime 13.8 Seconds      INR 1.07    BNP [[id number]]  (Normal) Collected: 04/08/22 0201    Specimen: Blood Updated: 04/08/22 0231     proBNP 527.0 pg/mL     Narrative:      Among patients with dyspnea, NT-proBNP is highly sensitive for the detection of acute congestive heart failure. In addition NT-proBNP of <300 pg/ml  "effectively rules out acute congestive heart failure with 99% negative predictive value.    Results may be falsely decreased if patient taking Biotin.      Troponin [[id number]]  (Normal) Collected: 04/08/22 0201    Specimen: Blood Updated: 04/08/22 0235     Troponin T <0.010 ng/mL     Narrative:      Troponin T Reference Range:  <= 0.03 ng/mL-   Negative for AMI  >0.03 ng/mL-     Abnormal for myocardial necrosis.  Clinicians would have to utilize clinical acumen, EKG, Troponin and serial changes to determine if it is an Acute Myocardial Infarction or myocardial injury due to an underlying chronic condition.       Results may be falsely decreased if patient taking Biotin.      Blood Culture - Blood, Arm, Right [[id number]] Collected: 04/08/22 0201    Specimen: Blood from Arm, Right Updated: 04/08/22 0208    Lactic Acid, Plasma [[id number]]  (Normal) Collected: 04/08/22 0201    Specimen: Blood Updated: 04/08/22 0227     Lactate 1.3 mmol/L     Procalcitonin [[id number]]  (Normal) Collected: 04/08/22 0201    Specimen: Blood Updated: 04/08/22 0239     Procalcitonin 0.03 ng/mL     Narrative:      As a Marker for Sepsis (Non-Neonates):    1. <0.5 ng/mL represents a low risk of severe sepsis and/or septic shock.  2. >2 ng/mL represents a high risk of severe sepsis and/or septic shock.    As a Marker for Lower Respiratory Tract Infections that require antibiotic therapy:    PCT on Admission    Antibiotic Therapy       6-12 Hrs later    >0.5                Strongly Recommended  >0.25 - <0.5        Recommended   0.1 - 0.25          Discouraged              Remeasure/reassess PCT  <0.1                Strongly Discouraged     Remeasure/reassess PCT    As 28 day mortality risk marker: \"Change in Procalcitonin Result\" (>80% or <=80%) if Day 0 (or Day 1) and Day 4 values are available. Refer to http://www.[facility]-pct-calculator.com    Change in PCT <=80%  A decrease of PCT levels below or equal to 80% defines a positive change in PCT " test result representing a higher risk for 28-day all-cause mortality of patients diagnosed with severe sepsis for septic shock.    Change in PCT >80%  A decrease of PCT levels of more than 80% defines a negative change in PCT result representing a lower risk for 28-day all-cause mortality of patients diagnosed with severe sepsis or septic shock.       CBC Auto Differential [[id number]]  (Abnormal) Collected: 04/08/22 0201    Specimen: Blood Updated: 04/08/22 0213     WBC 10.09 10*3/mm3      RBC 3.77 10*6/mm3      Hemoglobin 11.6 g/dL      Hematocrit 33.4 %      MCV 88.6 fL      MCH 30.8 pg      MCHC 34.7 g/dL      RDW 13.6 %      RDW-SD 44.3 fl      MPV 9.1 fL      Platelets 228 10*3/mm3      Neutrophil % 79.3 %      Lymphocyte % 10.7 %      Monocyte % 8.2 %      Eosinophil % 1.0 %      Basophil % 0.3 %      Immature Grans % 0.5 %      Neutrophils, Absolute 8.00 10*3/mm3      Lymphocytes, Absolute 1.08 10*3/mm3      Monocytes, Absolute 0.83 10*3/mm3      Eosinophils, Absolute 0.10 10*3/mm3      Basophils, Absolute 0.03 10*3/mm3      Immature Grans, Absolute 0.05 10*3/mm3      nRBC 0.0 /100 WBC     TSH [[id number]] Collected: 04/08/22 0201    Specimen: Blood Updated: 04/08/22 1110    Respiratory Panel PCR w/COVID-19(SARS-CoV-2) THERESA/ROBERT/MATHEUS/PAD/COR/MAD/PEPE In-House, NP Swab in [facility], 3-4 HR TAT - Swab, Nasopharynx [[id number]]  (Normal) Collected: 04/08/22 0210    Specimen: Swab from Nasopharynx Updated: 04/08/22 0317     ADENOVIRUS, PCR Not Detected     Coronavirus 229E Not Detected     Coronavirus HKU1 Not Detected     Coronavirus NL63 Not Detected     Coronavirus OC43 Not Detected     COVID19 Not Detected     Human Metapneumovirus Not Detected     Human Rhinovirus/Enterovirus Not Detected     Influenza A PCR Not Detected     Influenza B PCR Not Detected     Parainfluenza Virus 1 Not Detected     Parainfluenza Virus 2 Not Detected     Parainfluenza Virus 3 Not Detected     Parainfluenza Virus 4 Not Detected     RSV,  PCR Not Detected     Bordetella pertussis pcr Not Detected     Bordetella parapertussis PCR Not Detected     Chlamydophila pneumoniae PCR Not Detected     Mycoplasma pneumo by PCR Not Detected    Narrative:      In the setting of a positive respiratory panel with a viral infection PLUS a negative procalcitonin without other underlying concern for bacterial infection, consider observing off antibiotics or discontinuation of antibiotics and continue supportive care. If the respiratory panel is positive for atypical bacterial infection (Bordetella pertussis, Chlamydophila pneumoniae, or Mycoplasma pneumoniae), consider antibiotic de-escalation to target atypical bacterial infection.    Blood Culture - Blood, Arm, Left [[id number]] Collected: 04/08/22 0310    Specimen: Blood from Arm, Left Updated: 04/08/22 0328    Urinalysis With Microscopic If Indicated (No Culture) - Urine, Catheter [[id number]]  (Abnormal) Collected: 04/08/22 0456    Specimen: Urine, Catheter Updated: 04/08/22 0521     Color, UA Yellow     Appearance, UA Clear     pH, UA 7.5     Specific Gravity, UA >=1.030     Glucose, UA Negative     Ketones, UA 15 mg/dL (1+)     Bilirubin, UA Negative     Blood, UA Small (1+)     Protein, UA Negative     Leuk Esterase, UA Negative     Nitrite, UA Negative     Urobilinogen, UA 0.2 E.U./dL    Urinalysis, Microscopic Only - Urine, Catheter [[id number]]  (Abnormal) Collected: 04/08/22 0456    Specimen: Urine, Catheter Updated: 04/08/22 0521     RBC, UA 6-12 /HPF      WBC, UA 0-2 /HPF      Bacteria, UA None Seen /HPF      Squamous Epithelial Cells, UA 0-2 /HPF      Hyaline Casts, UA None Seen /LPF      Methodology Automated Microscopy          Imaging Results (Last 24 Hours)     Procedure Component Value Units Date/Time    CT Angiogram Chest [[id number]] Collected: 04/08/22 0454     Updated: 04/08/22 0502    Narrative:      CT ANGIOGRAM OF THE CHEST     HISTORY: Cough and shortness of breath     COMPARISON: None  available.     TECHNIQUE: Axial CT imaging was obtained through the thorax. IV contrast  was administered. Three-D reformatted images were obtained.     FINDINGS:  No acute pulmonary thromboembolus is seen. There is enlargement of the  main pulmonary artery, which can be seen in the setting of pulmonary  arterial hypertension. Thoracic aorta is normal in caliber. There is no  evidence of dissection. There is calcification of the thoracic aorta and  coronary arteries. There is mild dependent atelectasis. Mediastinal  lymph nodes do not appear pathologically enlarged. Images through the  upper abdomen do not demonstrate any acute abnormalities. No acute  osseous abnormalities are seen. There are changes of prior thoracolumbar  spinal fusion.       Impression:      No acute intrathoracic findings.     Radiation dose reduction techniques were utilized, including automated  exposure control and exposure modulation based on body size.     This report was finalized on 4/8/2022 4:59 AM by Dr. Saba Armijo M.D.       CT Head Without Contrast [[id number]] Collected: 04/08/22 0300     Updated: 04/08/22 0306    Narrative:      CT HEAD WITHOUT CONTRAST   HISTORY: altered mental status        COMPARISON: 10/22/2021     TECHNIQUE: Axial CT imaging was obtained through the brain. No IV  contrast was administered.     FINDINGS:  No acute intracranial hemorrhage is seen. There is atrophy. There is  periventricular and deep white matter microangiopathic change. There is  no midline shift or mass effect. Mucosal thickening is noted within the  paranasal sinuses.       Impression:      No acute intracranial findings.     Radiation dose reduction techniques were utilized, including automated  exposure control and exposure modulation based on body size.     This report was finalized on 4/8/2022 3:03 AM by Dr. Saba Armijo M.D.       XR Chest 1 View [[id number]] Collected: 04/08/22 0210     Updated: 04/08/22 0214    Narrative:       SINGLE VIEW OF THE CHEST     HISTORY: Shortness of air     COMPARISON: 10/22/2021     FINDINGS:. Cardiomegaly is present. There is no vascular congestion. No  pneumothorax or pleural effusion is seen. There is tortuosity of the  aorta. Patient is status post left shoulder arthroplasty.          Impression:      No acute findings.     This report was finalized on 4/8/2022 2:11 AM by Dr. Saba Armijo M.D.                 ECG 12 Lead   Final Result   HEART RATE= 85  bpm   RR Interval= 720  ms   UT Interval= 150  ms   P Horizontal Axis= 22  deg   P Front Axis= 65  deg   QRSD Interval= 76  ms   QT Interval= 382  ms   QRS Axis= 29  deg   T Wave Axis= 59  deg   - OTHERWISE NORMAL ECG -   Sinus rhythm   Atrial premature complex   No change from prior tracing   Electronically Signed By: [facility] ([facility]) 08-Apr-2022 05:56:28   Date and Time of Study: 2022-04-08 01:51:28           Assessment/Plan     Active Hospital Problems    Diagnosis  POA   • **Encephalopathy, toxic [G92.9]  Yes   • Altered mental status [R41.82]  Yes   • Dehydration [E86.0]  Yes   • Polypharmacy [Z79.899]  Not Applicable   • Chronic prescription opiate use [Z79.891]  Not Applicable   • Urinary incontinence [R32]  Yes   • SIN (obstructive sleep apnea) [G47.33]  Yes   • Obesity (BMI 30-39.9) [E66.9]  Yes   • Hypertension [I10]  Yes      Resolved Hospital Problems   No resolved problems to display.     72 y.o. female with a history of migraines and chronic pain on prescription opiates who presented with poor p.o. intake and altered mental status following a surgery 3 days prior.   CT head on admission is negative for fracture or bleed.  Chest x-ray and urinalysis negative for infection.  She is afebrile and has no leukocytosis, negative procalcitonin and negative lactic acid.    Altered mental status:  -Most suspicious for toxic encephalopathy due to tramadol and oxycodone in the setting of poor p.o. intake following surgery  -Stop  tramadol, change oxycodone to her home Norco at a lower dose.  Resume home Lyrica at a lower dose  -Supportive care with fluids, delirium precautions.  Monitor for urinary retention and constipation  -Ask SLP to evaluate before starting diet  -Check TSH, B12, urine drug screen    Recent left hip surgery:  -Appears she had a wound VAC and a drain prior to admission.  The drain fell out prior to presentation here.  Will ask wound care to evaluate    Hypertension: Blood pressure elevated.  Resume home lisinopril    Urinary incontinence: Takes the Viibryd and Toviaz at home.  Oxybutynin while here.  Monitor for urinary retention    Obstructive sleep apnea: Resume home CPAP    · I discussed the patient's findings and my recommendations with patient, family, nursing staff and ED provider.    VTE Prophylaxis -Lovenox  Code Status - Full code.       Monik Ulrich DO  [facility]  04/08/22  11:30 EDT

## 2022-04-08 NOTE — ED NOTES
Pt is aware of need for urine sample, pt is currently nauseous, has been sitting with arm bent, IVF's still infusing.

## 2022-04-08 NOTE — ED PROVIDER NOTES
MD ATTESTATION NOTE    The ASHISH and I have discussed this patient's history, physical exam, and treatment plan.    I provided a substantive portion of the care of this patient. I personally performed the physical exam, in its entirety. The attached note describes my personal findings.      Jaylyn Fuentes is a 72 y.o. female who presents to the ED c/o cough past day.  Denies any pain.  States the cough is nonproductive.  States he feels little bit short of breath.  No fevers or chills.  Patient is status post recent pin placement and left hip.      On exam:  GENERAL: not distressed  HENT: nares patent  EYES: no scleral icterus  CV: regular rhythm, regular rate  RESPIRATORY: normal effort  ABDOMEN: soft  MUSCULOSKELETAL: no deformity  NEURO: alert, moves all extremities, follows commands  SKIN: warm, dry    Labs  Recent Results (from the past 24 hour(s))   ECG 12 Lead    Collection Time: 04/08/22  1:51 AM   Result Value Ref Range    QT Interval 382 ms   Comprehensive Metabolic Panel    Collection Time: 04/08/22  2:01 AM    Specimen: Blood   Result Value Ref Range    Glucose 120 (H) 65 - 99 mg/dL    BUN 10 8 - 23 mg/dL    Creatinine 0.75 0.57 - 1.00 mg/dL    Sodium 140 136 - 145 mmol/L    Potassium 3.7 3.5 - 5.2 mmol/L    Chloride 102 98 - 107 mmol/L    CO2 26.3 22.0 - 29.0 mmol/L    Calcium 9.0 8.6 - 10.5 mg/dL    Total Protein 7.0 6.0 - 8.5 g/dL    Albumin 3.90 3.50 - 5.20 g/dL    ALT (SGPT) 11 1 - 33 U/L    AST (SGOT) 33 (H) 1 - 32 U/L    Alkaline Phosphatase 79 39 - 117 U/L    Total Bilirubin 0.8 0.0 - 1.2 mg/dL    Globulin 3.1 gm/dL    A/G Ratio 1.3 g/dL    BUN/Creatinine Ratio 13.3 7.0 - 25.0    Anion Gap 11.7 5.0 - 15.0 mmol/L    eGFR 84.2 >60.0 mL/min/1.73   Protime-INR    Collection Time: 04/08/22  2:01 AM    Specimen: Arm, Right; Blood   Result Value Ref Range    Protime 13.8 11.7 - 14.2 Seconds    INR 1.07 0.90 - 1.10   BNP    Collection Time: 04/08/22  2:01 AM    Specimen: Blood   Result Value Ref Range     proBNP 527.0 0.0 - 900.0 pg/mL   Troponin    Collection Time: 04/08/22  2:01 AM    Specimen: Blood   Result Value Ref Range    Troponin T <0.010 0.000 - 0.030 ng/mL   Lactic Acid, Plasma    Collection Time: 04/08/22  2:01 AM    Specimen: Blood   Result Value Ref Range    Lactate 1.3 0.5 - 2.0 mmol/L   Procalcitonin    Collection Time: 04/08/22  2:01 AM    Specimen: Blood   Result Value Ref Range    Procalcitonin 0.03 0.00 - 0.25 ng/mL   CBC Auto Differential    Collection Time: 04/08/22  2:01 AM    Specimen: Blood   Result Value Ref Range    WBC 10.09 3.40 - 10.80 10*3/mm3    RBC 3.77 3.77 - 5.28 10*6/mm3    Hemoglobin 11.6 (L) 12.0 - 15.9 g/dL    Hematocrit 33.4 (L) 34.0 - 46.6 %    MCV 88.6 79.0 - 97.0 fL    MCH 30.8 26.6 - 33.0 pg    MCHC 34.7 31.5 - 35.7 g/dL    RDW 13.6 12.3 - 15.4 %    RDW-SD 44.3 37.0 - 54.0 fl    MPV 9.1 6.0 - 12.0 fL    Platelets 228 140 - 450 10*3/mm3    Neutrophil % 79.3 (H) 42.7 - 76.0 %    Lymphocyte % 10.7 (L) 19.6 - 45.3 %    Monocyte % 8.2 5.0 - 12.0 %    Eosinophil % 1.0 0.3 - 6.2 %    Basophil % 0.3 0.0 - 1.5 %    Immature Grans % 0.5 0.0 - 0.5 %    Neutrophils, Absolute 8.00 (H) 1.70 - 7.00 10*3/mm3    Lymphocytes, Absolute 1.08 0.70 - 3.10 10*3/mm3    Monocytes, Absolute 0.83 0.10 - 0.90 10*3/mm3    Eosinophils, Absolute 0.10 0.00 - 0.40 10*3/mm3    Basophils, Absolute 0.03 0.00 - 0.20 10*3/mm3    Immature Grans, Absolute 0.05 0.00 - 0.05 10*3/mm3    nRBC 0.0 0.0 - 0.2 /100 WBC   Respiratory Panel PCR w/COVID-19(SARS-CoV-2) [facility] In-House, NP Swab in UTM/VTM, 3-4 HR TAT - Swab, Nasopharynx    Collection Time: 04/08/22  2:10 AM    Specimen: Nasopharynx; Swab   Result Value Ref Range    ADENOVIRUS, PCR Not Detected Not Detected    Coronavirus 229E Not Detected Not Detected    Coronavirus HKU1 Not Detected Not Detected    Coronavirus NL63 Not Detected Not Detected    Coronavirus OC43 Not Detected Not Detected    COVID19 Not Detected Not Detected - Ref. Range     Human Metapneumovirus Not Detected Not Detected    Human Rhinovirus/Enterovirus Not Detected Not Detected    Influenza A PCR Not Detected Not Detected    Influenza B PCR Not Detected Not Detected    Parainfluenza Virus 1 Not Detected Not Detected    Parainfluenza Virus 2 Not Detected Not Detected    Parainfluenza Virus 3 Not Detected Not Detected    Parainfluenza Virus 4 Not Detected Not Detected    RSV, PCR Not Detected Not Detected    Bordetella pertussis pcr Not Detected Not Detected    Bordetella parapertussis PCR Not Detected Not Detected    Chlamydophila pneumoniae PCR Not Detected Not Detected    Mycoplasma pneumo by PCR Not Detected Not Detected   Urinalysis With Microscopic If Indicated (No Culture) - Urine, Catheter    Collection Time: 04/08/22  4:56 AM    Specimen: Urine, Catheter   Result Value Ref Range    Color, UA Yellow Yellow, Straw    Appearance, UA Clear Clear    pH, UA 7.5 5.0 - 8.0    Specific Gravity, UA >=1.030 1.005 - 1.030    Glucose, UA Negative Negative    Ketones, UA 15 mg/dL (1+) (A) Negative    Bilirubin, UA Negative Negative    Blood, UA Small (1+) (A) Negative    Protein, UA Negative Negative    Leuk Esterase, UA Negative Negative    Nitrite, UA Negative Negative    Urobilinogen, UA 0.2 E.U./dL 0.2 - 1.0 E.U./dL   Urinalysis, Microscopic Only - Urine, Catheter    Collection Time: 04/08/22  4:56 AM    Specimen: Urine, Catheter   Result Value Ref Range    RBC, UA 6-12 (A) None Seen, 0-2 /HPF    WBC, UA 0-2 None Seen, 0-2 /HPF    Bacteria, UA None Seen None Seen /HPF    Squamous Epithelial Cells, UA 0-2 None Seen, 0-2 /HPF    Hyaline Casts, UA None Seen None Seen /LPF    Methodology Automated Microscopy        Radiology  CT Head Without Contrast    Result Date: 4/8/2022  CT HEAD WITHOUT CONTRAST HISTORY: altered mental status   COMPARISON: 10/22/2021  TECHNIQUE: Axial CT imaging was obtained through the brain. No IV contrast was administered.  FINDINGS: No acute intracranial  hemorrhage is seen. There is atrophy. There is periventricular and deep white matter microangiopathic change. There is no midline shift or mass effect. Mucosal thickening is noted within the paranasal sinuses.      No acute intracranial findings.  Radiation dose reduction techniques were utilized, including automated exposure control and exposure modulation based on body size.  This report was finalized on 4/8/2022 3:03 AM by Dr. Saba Armijo M.D.      XR Chest 1 View    Result Date: 4/8/2022  SINGLE VIEW OF THE CHEST  HISTORY: Shortness of air  COMPARISON: 10/22/2021  FINDINGS:. Cardiomegaly is present. There is no vascular congestion. No pneumothorax or pleural effusion is seen. There is tortuosity of the aorta. Patient is status post left shoulder arthroplasty.       No acute findings.  This report was finalized on 4/8/2022 2:11 AM by Dr. Saba Armijo M.D.      CT Angiogram Chest    Result Date: 4/8/2022  CT ANGIOGRAM OF THE CHEST  HISTORY: Cough and shortness of breath  COMPARISON: None available.  TECHNIQUE: Axial CT imaging was obtained through the thorax. IV contrast was administered. Three-D reformatted images were obtained.  FINDINGS: No acute pulmonary thromboembolus is seen. There is enlargement of the main pulmonary artery, which can be seen in the setting of pulmonary arterial hypertension. Thoracic aorta is normal in caliber. There is no evidence of dissection. There is calcification of the thoracic aorta and coronary arteries. There is mild dependent atelectasis. Mediastinal lymph nodes do not appear pathologically enlarged. Images through the upper abdomen do not demonstrate any acute abnormalities. No acute osseous abnormalities are seen. There are changes of prior thoracolumbar spinal fusion.      No acute intrathoracic findings.  Radiation dose reduction techniques were utilized, including automated exposure control and exposure modulation based on body size.  This report was finalized  on 4/8/2022 4:59 AM by Dr. Saba Armijo M.D.        Medical Decision Making:  ED Course as of 04/08/22 0531   Fri Apr 08, 2022 0158 EKG          EKG time: 01:51  Rhythm/Rate: Sinus rhythm at 85 bpm, atrial premature complex  P waves and OK: Normal  QRS, axis: Normal  ST and T waves: No acute ST/T wave changes    Interpreted Contemporaneously by me, independently viewed  Not significantly changed compared to prior EKG of 9/15/2021.   [YENI]   0303 WBC: 10.09 [YENI]   0303 Hemoglobin(!): 11.6 [YENI]   0303 Hematocrit(!): 33.4 [YENI]   0303 Platelets: 228 [YENI]   0303 Lactate: 1.3 [YENI]   0303 Glucose(!): 120 [YENI]   0303 BUN: 10 [YENI]   0303 Creatinine: 0.75 [YENI]   0303 Sodium: 140 [YENI]   0303 Potassium: 3.7 [YENI]   0303 Chloride: 102 [YENI]   0303 CO2: 26.3 [YENI]   0303 Procalcitonin: 0.03 [YENI]   0303 Troponin T: <0.010 [YENI]   0303 proBNP: 527.0 [YEIN]   0518 Patient history, presentation, and ER evaluation discussed with Ruel BOWMAN, will place in observation to a telemetry bed per Dr. Davila. [YENI]   0526 WBC, UA: 0-2 [YENI]   0526 Bacteria, UA: None Seen [YENI]      ED Course User Index  [YENI] Roverto Avery, PA           PPE: Both the patient and I wore a surgical mask throughout the entire patient encounter. I wore protective goggles.     Diagnosis  Final diagnoses:   Altered mental status, unspecified altered mental status type   Cough   Primary hypertension   Hyperlipidemia, unspecified hyperlipidemia type   Gastroesophageal reflux disease, unspecified whether esophagitis present   History of revision of total hip arthroplasty        Alverto Pineda MD  04/08/22 0564

## 2022-04-09 LAB
ANION GAP SERPL CALCULATED.3IONS-SCNC: 10 MMOL/L (ref 5–15)
BASOPHILS # BLD AUTO: 0.03 10*3/MM3 (ref 0–0.2)
BASOPHILS NFR BLD AUTO: 0.3 % (ref 0–1.5)
BUN SERPL-MCNC: 9 MG/DL (ref 8–23)
BUN/CREAT SERPL: 14.3 (ref 7–25)
CALCIUM SPEC-SCNC: 8.8 MG/DL (ref 8.6–10.5)
CHLORIDE SERPL-SCNC: 102 MMOL/L (ref 98–107)
CO2 SERPL-SCNC: 28 MMOL/L (ref 22–29)
CREAT SERPL-MCNC: 0.63 MG/DL (ref 0.57–1)
DEPRECATED RDW RBC AUTO: 44.8 FL (ref 37–54)
EGFRCR SERPLBLD CKD-EPI 2021: 93.8 ML/MIN/1.73
EOSINOPHIL # BLD AUTO: 0.11 10*3/MM3 (ref 0–0.4)
EOSINOPHIL NFR BLD AUTO: 1.3 % (ref 0.3–6.2)
ERYTHROCYTE [DISTWIDTH] IN BLOOD BY AUTOMATED COUNT: 13.5 % (ref 12.3–15.4)
GLUCOSE SERPL-MCNC: 86 MG/DL (ref 65–99)
HCT VFR BLD AUTO: 33.5 % (ref 34–46.6)
HGB BLD-MCNC: 11.2 G/DL (ref 12–15.9)
IMM GRANULOCYTES # BLD AUTO: 0.02 10*3/MM3 (ref 0–0.05)
IMM GRANULOCYTES NFR BLD AUTO: 0.2 % (ref 0–0.5)
LYMPHOCYTES # BLD AUTO: 1.67 10*3/MM3 (ref 0.7–3.1)
LYMPHOCYTES NFR BLD AUTO: 19 % (ref 19.6–45.3)
MAGNESIUM SERPL-MCNC: 2.1 MG/DL (ref 1.6–2.4)
MCH RBC QN AUTO: 30.2 PG (ref 26.6–33)
MCHC RBC AUTO-ENTMCNC: 33.4 G/DL (ref 31.5–35.7)
MCV RBC AUTO: 90.3 FL (ref 79–97)
MONOCYTES # BLD AUTO: 0.89 10*3/MM3 (ref 0.1–0.9)
MONOCYTES NFR BLD AUTO: 10.1 % (ref 5–12)
NEUTROPHILS NFR BLD AUTO: 6.07 10*3/MM3 (ref 1.7–7)
NEUTROPHILS NFR BLD AUTO: 69.1 % (ref 42.7–76)
NRBC BLD AUTO-RTO: 0 /100 WBC (ref 0–0.2)
PHOSPHATE SERPL-MCNC: 1.7 MG/DL (ref 2.5–4.5)
PLATELET # BLD AUTO: 241 10*3/MM3 (ref 140–450)
PMV BLD AUTO: 9.5 FL (ref 6–12)
POTASSIUM SERPL-SCNC: 3.3 MMOL/L (ref 3.5–5.2)
POTASSIUM SERPL-SCNC: 4.3 MMOL/L (ref 3.5–5.2)
RBC # BLD AUTO: 3.71 10*6/MM3 (ref 3.77–5.28)
SODIUM SERPL-SCNC: 140 MMOL/L (ref 136–145)
VIT B12 BLD-MCNC: 499 PG/ML (ref 211–946)
WBC NRBC COR # BLD: 8.79 10*3/MM3 (ref 3.4–10.8)

## 2022-04-09 PROCEDURE — 97162 PT EVAL MOD COMPLEX 30 MIN: CPT

## 2022-04-09 PROCEDURE — 96372 THER/PROPH/DIAG INJ SC/IM: CPT

## 2022-04-09 PROCEDURE — 84100 ASSAY OF PHOSPHORUS: CPT | Performed by: STUDENT IN AN ORGANIZED HEALTH CARE EDUCATION/TRAINING PROGRAM

## 2022-04-09 PROCEDURE — 96366 THER/PROPH/DIAG IV INF ADDON: CPT

## 2022-04-09 PROCEDURE — 80048 BASIC METABOLIC PNL TOTAL CA: CPT | Performed by: STUDENT IN AN ORGANIZED HEALTH CARE EDUCATION/TRAINING PROGRAM

## 2022-04-09 PROCEDURE — G0378 HOSPITAL OBSERVATION PER HR: HCPCS

## 2022-04-09 PROCEDURE — 83735 ASSAY OF MAGNESIUM: CPT | Performed by: STUDENT IN AN ORGANIZED HEALTH CARE EDUCATION/TRAINING PROGRAM

## 2022-04-09 PROCEDURE — 36415 COLL VENOUS BLD VENIPUNCTURE: CPT | Performed by: STUDENT IN AN ORGANIZED HEALTH CARE EDUCATION/TRAINING PROGRAM

## 2022-04-09 PROCEDURE — 25010000002 ENOXAPARIN PER 10 MG: Performed by: STUDENT IN AN ORGANIZED HEALTH CARE EDUCATION/TRAINING PROGRAM

## 2022-04-09 PROCEDURE — 97530 THERAPEUTIC ACTIVITIES: CPT

## 2022-04-09 PROCEDURE — 96361 HYDRATE IV INFUSION ADD-ON: CPT

## 2022-04-09 PROCEDURE — 85025 COMPLETE CBC W/AUTO DIFF WBC: CPT | Performed by: STUDENT IN AN ORGANIZED HEALTH CARE EDUCATION/TRAINING PROGRAM

## 2022-04-09 PROCEDURE — 82607 VITAMIN B-12: CPT | Performed by: STUDENT IN AN ORGANIZED HEALTH CARE EDUCATION/TRAINING PROGRAM

## 2022-04-09 PROCEDURE — 96365 THER/PROPH/DIAG IV INF INIT: CPT

## 2022-04-09 PROCEDURE — 84132 ASSAY OF SERUM POTASSIUM: CPT | Performed by: STUDENT IN AN ORGANIZED HEALTH CARE EDUCATION/TRAINING PROGRAM

## 2022-04-09 RX ORDER — MAGNESIUM SULFATE HEPTAHYDRATE 40 MG/ML
2 INJECTION, SOLUTION INTRAVENOUS AS NEEDED
Status: DISCONTINUED | OUTPATIENT
Start: 2022-04-09 | End: 2022-04-11 | Stop reason: HOSPADM

## 2022-04-09 RX ORDER — POTASSIUM CHLORIDE 1.5 G/1.77G
40 POWDER, FOR SOLUTION ORAL AS NEEDED
Status: DISCONTINUED | OUTPATIENT
Start: 2022-04-09 | End: 2022-04-11 | Stop reason: HOSPADM

## 2022-04-09 RX ORDER — POTASSIUM CHLORIDE 750 MG/1
40 TABLET, FILM COATED, EXTENDED RELEASE ORAL AS NEEDED
Status: DISCONTINUED | OUTPATIENT
Start: 2022-04-09 | End: 2022-04-11 | Stop reason: HOSPADM

## 2022-04-09 RX ORDER — MAGNESIUM SULFATE HEPTAHYDRATE 40 MG/ML
4 INJECTION, SOLUTION INTRAVENOUS AS NEEDED
Status: DISCONTINUED | OUTPATIENT
Start: 2022-04-09 | End: 2022-04-11 | Stop reason: HOSPADM

## 2022-04-09 RX ADMIN — OXYBUTYNIN CHLORIDE 10 MG: 10 TABLET, EXTENDED RELEASE ORAL at 08:59

## 2022-04-09 RX ADMIN — LISINOPRIL 20 MG: 20 TABLET ORAL at 08:59

## 2022-04-09 RX ADMIN — PANTOPRAZOLE SODIUM 40 MG: 40 TABLET, DELAYED RELEASE ORAL at 17:41

## 2022-04-09 RX ADMIN — POTASSIUM PHOSPHATE, MONOBASIC AND POTASSIUM PHOSPHATE, DIBASIC 30 MMOL: 224; 236 INJECTION, SOLUTION, CONCENTRATE INTRAVENOUS at 10:15

## 2022-04-09 RX ADMIN — PREGABALIN 150 MG: 75 CAPSULE ORAL at 06:39

## 2022-04-09 RX ADMIN — LISINOPRIL 20 MG: 20 TABLET ORAL at 20:50

## 2022-04-09 RX ADMIN — VILAZODONE HYDROCHLORIDE 40 MG: 40 TABLET ORAL at 08:59

## 2022-04-09 RX ADMIN — CALCIUM CARBONATE-VITAMIN D TAB 500 MG-200 UNIT 1 TABLET: 500-200 TAB at 08:59

## 2022-04-09 RX ADMIN — DIPHENOXYLATE HYDROCHLORIDE AND ATROPINE SULFATE 1 TABLET: 2.5; .025 TABLET ORAL at 15:14

## 2022-04-09 RX ADMIN — POTASSIUM CHLORIDE 40 MEQ: 10 TABLET, EXTENDED RELEASE ORAL at 09:07

## 2022-04-09 RX ADMIN — POTASSIUM CHLORIDE 40 MEQ: 10 TABLET, EXTENDED RELEASE ORAL at 15:14

## 2022-04-09 RX ADMIN — CETIRIZINE HYDROCHLORIDE 10 MG: 10 TABLET ORAL at 08:59

## 2022-04-09 RX ADMIN — SODIUM CHLORIDE 100 ML/HR: 9 INJECTION, SOLUTION INTRAVENOUS at 01:19

## 2022-04-09 RX ADMIN — ACETAMINOPHEN 650 MG: 325 TABLET ORAL at 15:13

## 2022-04-09 RX ADMIN — PANTOPRAZOLE SODIUM 40 MG: 40 TABLET, DELAYED RELEASE ORAL at 06:39

## 2022-04-09 RX ADMIN — PREGABALIN 150 MG: 75 CAPSULE ORAL at 17:41

## 2022-04-09 RX ADMIN — OLANZAPINE 2.5 MG: 2.5 TABLET ORAL at 20:50

## 2022-04-09 RX ADMIN — ACETAMINOPHEN 650 MG: 325 TABLET ORAL at 19:25

## 2022-04-09 RX ADMIN — ACETAMINOPHEN 650 MG: 325 TABLET ORAL at 10:59

## 2022-04-09 RX ADMIN — ENOXAPARIN SODIUM 40 MG: 100 INJECTION SUBCUTANEOUS at 20:50

## 2022-04-09 RX ADMIN — ROSUVASTATIN CALCIUM 10 MG: 10 TABLET, FILM COATED ORAL at 08:59

## 2022-04-09 NOTE — PLAN OF CARE
Goal Outcome Evaluation:  Plan of Care Reviewed With: patient        Progress: no change  Outcome Evaluation: VSS. C/o migraine pain - one time dose sumatriptan ordered with some relief. A&O to self and place. Fall precautions in place. IV fluids infusing. Slept intermittently. Will continue to monitor.

## 2022-04-09 NOTE — PLAN OF CARE
Goal Outcome Evaluation:               VSS. A&O*4 .Pt up with PT today.Walked down the [place] with nursing staff with a cane,required minimal assist.K & phos replaced ,recheck for tonight.Possible DC tomorrow.WCTM

## 2022-04-09 NOTE — PLAN OF CARE
Goal Outcome Evaluation:  Plan of Care Reviewed With: patient        Progress: no change  Outcome Evaluation: Patient is a 71 yo female who presented with confusion. She has a L MEENAKSHI (anterior) revision on 4/5/2022 at [place]. PMHx inlcudes HTN, sleep apnea, urinary incontinence. She reports since sx has been using SPC and lives with her partner. She has 2-3 CHRISTY with R handrails. She presents today with decreased activity tolerance, strength, ROM, and increased pain. She completed STS to SPC with SBA and ambulated 100ft using SPC requiring SBA/CGA. No LOB or unsteadiness noted. From a mobility standpoint pt is safe to discharge home but hesitant to return to home setting. Pt will continue to benefit from skilled PT to address functional deficits. PT rec home with partner and HHPT but pt interested in SNF.

## 2022-04-09 NOTE — PROGRESS NOTES
Name: Jaylyn Fuentes ADMIT: 2022   : 1950  PCP: Annie Romero MD    MRN: [id number] LOS: 0 days   AGE/SEX: 72 y.o. female  ROOM: [facility]     Subjective   Subjective   Seems completely back to normal today.  She is oriented x4.  Says she feels much better but still has a poor appetite.  She denies dysphagia, nausea, abdominal pain    Review of Systems  Denies chest pain, shortness of breath, fevers, lower extremity swelling     Objective   Objective   Vital Signs  Temp:  [98.6 °F (37 °C)-99.2 °F (37.3 °C)] 98.9 °F (37.2 °C)  Heart Rate:  [72-90] 72  Resp:  [16-18] 16  BP: (141-163)/(62-85) 163/81  SpO2:  [92 %-95 %] 93 %  on   ;   Device (Oxygen Therapy): room air  Body mass index is 37.46 kg/m².  Physical Exam  Constitutional:       General: She is not in acute distress.     Appearance: She is not diaphoretic.   Cardiovascular:      Rate and Rhythm: Normal rate and regular rhythm.   Pulmonary:      Effort: Pulmonary effort is normal. No respiratory distress.      Breath sounds: No wheezing.   Abdominal:      Palpations: Abdomen is soft.      Tenderness: There is no abdominal tenderness. There is no guarding.   Musculoskeletal:      Right lower leg: No edema.      Left lower leg: No edema.   Neurological:      Mental Status: She is alert and oriented to person, place, and time.   Psychiatric:         Mood and Affect: Mood normal.         Results Review     I reviewed the patient's new clinical results.  Results from last 7 days   Lab Units 22  0543 22  0201 22  0938   WBC 10*3/mm3 8.79 10.09 11.51*   HEMOGLOBIN g/dL 11.2* 11.6* 11.0*   PLATELETS 10*3/mm3 241 228 289     Results from last 7 days   Lab Units 22  0543 22  0201   SODIUM mmol/L 140 140   POTASSIUM mmol/L 3.3* 3.7   CHLORIDE mmol/L 102 102   CO2 mmol/L 28.0 26.3   BUN mg/dL 9 10   CREATININE mg/dL 0.63 0.75   GLUCOSE mg/dL 86 120*   Estimated Creatinine Clearance: 87.6 mL/min (by C-G formula based on SCr  of 0.63 mg/dL).  Results from last 7 days   Lab Units 04/08/22  0201   ALBUMIN g/dL 3.90   BILIRUBIN mg/dL 0.8   ALK PHOS U/L 79   AST (SGOT) U/L 33*   ALT (SGPT) U/L 11     Results from last 7 days   Lab Units 04/09/22  0543 04/08/22  0201   CALCIUM mg/dL 8.8 9.0   ALBUMIN g/dL  --  3.90   MAGNESIUM mg/dL 2.1  --    PHOSPHORUS mg/dL 1.7*  --      Results from last 7 days   Lab Units 04/08/22  0201   PROCALCITONIN ng/mL 0.03   LACTATE mmol/L 1.3     COVID19   Date Value Ref Range Status   04/08/2022 Not Detected Not Detected - Ref. Range Final   11/12/2021 Not Detected Not Detected - Ref. Range Final     No results found for: HGBA1C, POCGLU    CT Angiogram Chest  Narrative: CT ANGIOGRAM OF THE CHEST     HISTORY: Cough and shortness of breath     COMPARISON: None available.     TECHNIQUE: Axial CT imaging was obtained through the thorax. IV contrast  was administered. Three-D reformatted images were obtained.     FINDINGS:  No acute pulmonary thromboembolus is seen. There is enlargement of the  main pulmonary artery, which can be seen in the setting of pulmonary  arterial hypertension. Thoracic aorta is normal in caliber. There is no  evidence of dissection. There is calcification of the thoracic aorta and  coronary arteries. There is mild dependent atelectasis. Mediastinal  lymph nodes do not appear pathologically enlarged. Images through the  upper abdomen do not demonstrate any acute abnormalities. No acute  osseous abnormalities are seen. There are changes of prior thoracolumbar  spinal fusion.     Impression: No acute intrathoracic findings.     Radiation dose reduction techniques were utilized, including automated  exposure control and exposure modulation based on body size.     This report was finalized on 4/8/2022 4:59 AM by Dr. Saba Armijo M.D.     CT Head Without Contrast  Narrative: CT HEAD WITHOUT CONTRAST   HISTORY: altered mental status        COMPARISON: 10/22/2021     TECHNIQUE: Axial CT  imaging was obtained through the brain. No IV  contrast was administered.     FINDINGS:  No acute intracranial hemorrhage is seen. There is atrophy. There is  periventricular and deep white matter microangiopathic change. There is  no midline shift or mass effect. Mucosal thickening is noted within the  paranasal sinuses.     Impression: No acute intracranial findings.     Radiation dose reduction techniques were utilized, including automated  exposure control and exposure modulation based on body size.     This report was finalized on 4/8/2022 3:03 AM by Dr. Saba Armijo M.D.     XR Chest 1 View  Narrative: SINGLE VIEW OF THE CHEST     HISTORY: Shortness of air     COMPARISON: 10/22/2021     FINDINGS:. Cardiomegaly is present. There is no vascular congestion. No  pneumothorax or pleural effusion is seen. There is tortuosity of the  aorta. Patient is status post left shoulder arthroplasty.        Impression: No acute findings.     This report was finalized on 4/8/2022 2:11 AM by Dr. Saba Armijo M.D.       I reviewed the patient's daily medications.  Scheduled Medications  calcium-vitamin D, 1 tablet, Oral, Daily  cetirizine, 10 mg, Oral, Daily  enoxaparin, 40 mg, Subcutaneous, Nightly  lisinopril, 20 mg, Oral, BID  melatonin, 5 mg, Oral, Nightly  OLANZapine, 2.5 mg, Oral, Nightly  oxybutynin XL, 10 mg, Oral, Daily  pantoprazole, 40 mg, Oral, BID AC  pregabalin, 150 mg, Oral, Q PM  pregabalin, 150 mg, Oral, QAM  rosuvastatin, 10 mg, Oral, Daily  vilazodone, 40 mg, Oral, Daily    Infusions   Diet  Diet Soft Texture; Ground; Thin       Assessment/Plan     Active Hospital Problems    Diagnosis  POA   • **Encephalopathy, toxic [G92.9]  Yes   • Altered mental status [R41.82]  Yes   • Dehydration [E86.0]  Yes   • Polypharmacy [Z79.899]  Not Applicable   • Chronic prescription opiate use [Z79.891]  Not Applicable   • Urinary incontinence [R32]  Yes   • SIN (obstructive sleep apnea) [G47.33]  Yes   • Obesity  (BMI 30-39.9) [E66.9]  Yes   • Hypertension [I10]  Yes      Resolved Hospital Problems   No resolved problems to display.       72 y.o. female with a history of migraines and chronic pain on prescription opiates who presented with poor p.o. intake and altered mental status following a surgery 3 days prior.     Today: Her mental status is back to baseline.  Discussed with her partner Sarah who is concerned about her recent poor p.o. intake.  Will watch her another day.  Stop fluids, encourage oral hydration.  Replace phosphorus.  PT to see today     Toxic encephalopathy:  -Most likely due to tramadol and oxycodone in the setting of poor p.o. intake following surgery  -Stop tramadol, change oxycodone to her home Norco at a lower dose.  Resume home Lyrica at a lower dose  -resolved     Recent left hip surgery:  -had a drain prior to admission; partner says it fell out.  Wound care evaluated, removed. FU with [facility]     Hypertension: Blood pressure  acceptable acutely.  Resume home lisinopril     Urinary incontinence: Takes the Viibryd and Toviaz at home.  Oxybutynin while here.  Monitor for urinary retention     Obstructive sleep apnea: Resume home CPAP    · lovenox for DVT prophylaxis.  · Full code.  · Discussed with patient, family and nursing staff.  · Anticipate discharge home tomorrow.       Monik Ulrich DO  [facility]  04/09/22  11:53 EDT    Patient was placed in face mask on first look.  I wore protective equipment throughout this patient encounter including a face mask, gloves and protective eyewear.  Hand hygiene was performed before donning protective equipment and after removal when leaving the room.

## 2022-04-09 NOTE — THERAPY EVALUATION
Patient Name: Jaylyn Fuentes  : 1950    MRN: [id number]                              Today's Date: 2022       Admit Date: 2022    Visit Dx:     ICD-10-CM ICD-9-CM   1. Altered mental status, unspecified altered mental status type  R41.82 780.97   2. Cough  R05.9 786.2   3. Primary hypertension  I10 401.9   4. Hyperlipidemia, unspecified hyperlipidemia type  E78.5 272.4   5. Gastroesophageal reflux disease, unspecified whether esophagitis present  K21.9 530.81   6. History of revision of total hip arthroplasty  Z96.649 V43.64     Patient Active Problem List   Diagnosis   • Osteoarthrosis, localized, primary, shoulder region   • Hypertension   • Depressed   • Status post total replacement of left shoulder   • Duodenitis   • Abnormal CT scan, small bowel   • Nausea and vomiting   • SIN (obstructive sleep apnea)   • Snoring   • Hypersomnia   • Non-restorative sleep   • Obesity (BMI 30-39.9)   • Poor memory   • Frequent falls   • Migraine headache   • Altered mental status   • Dehydration   • Encephalopathy, toxic   • Polypharmacy   • Chronic prescription opiate use   • Urinary incontinence     Past Medical History:   Diagnosis Date   • Abdominal pain    • Abnormal CT of the abdomen    • Abnormal glucose    • Abnormal liver enzymes    • Abnormal urinalysis    • Achilles tendinitis     left   • Acid reflux disease    • Acromioclavicular joint arthritis    • Acromioclavicular joint pain, bilateral    • Aftercare following knee joint replacement surgery    • Alcohol abuse    • Alternating constipation and diarrhea    • Anemia    • Arthralgia of multiple sites    • Arthritis    • BMI 34.0-34.9,adult    • Carpal tunnel syndrome    • Cataract    • Cellulitis of extremity    • Chronic insomnia    • Chronic kidney disease, stage III (moderate) ([facility])    • Contact dermatitis    • Contusion of bone    • Cough    • Decreased range of motion    • Degeneration of cervical intervertebral disc    • Depressed    •  Dermatitis    • Diarrhea    • Dry eye    • Dysphagia    • Edema of extremities    • Esophagitis    • Fatigue    • Flu vaccine need    • GERD (gastroesophageal reflux disease)    • Glenohumeral arthritis, left    • Hemorrhoids    • Hiatal hernia    • Hip pain    • History of colonic polyps    • History of DVT in adulthood    • History of transfusion    • Hyperactivity of bladder    • Hyperlipidemia    • Hypertension    • Hypokalemia    • Irritable bowel syndrome with diarrhea    • Lactose intolerance    • Left ankle pain    • Leg cramps    • Leg swelling    • Limitation of joint motion of ankle, left    • Lower extremity tendinopathy    • Medicare annual wellness visit, subsequent    • Methicillin resistant Staphylococcus aureus infection    • Microscopic hematuria    • Migraine    • Obstructive sleep apnea    • Osteoarthritis, chronic    • Pain, foot, left, chronic    • Pancreatitis, chronic (HCC)    • Pes planus    • PONV (postoperative nausea and vomiting)     SCOPOLAMINE HELPS-USED DURING LAST SURGERY    • Rectal leakage    • Retrocalcaneal bursitis    • Right ankle instability    • Right ankle pain    • Right ankle sprain    • Right ankle swelling    • Rotator cuff tear, left    • S/P shoulder surgery    • Shoulder region pain    • Spinal stenosis    • Swallowing difficulty    • Urge incontinence of urine    • Urinary incontinence    • Visit for screening mammogram    • Wears contact lenses    • Wears dentures     UPPER AND LOWER    • Wears hearing aid     DOESN'T HAVE WITH HER   • Weight gain      Past Surgical History:   Procedure Laterality Date   • BACK SURGERY      LUMBAR FUSION    • CERVICAL FUSION      plates, rods, and screws   • CHOLECYSTECTOMY     • COLONOSCOPY      2 YEARS AGO    • ENDOSCOPY N/A 10/14/2021    Procedure: ESOPHAGOGASTRODUODENOSCOPY with biopsy and polypectomy;  Surgeon: Hussain Basilio MD;  Location: [facility] MAIN OR;  Service: Gastroenterology;  Laterality: N/A;  gastritis and polyps    • EYE SURGERY      LASER FOR KERITONOSIS    • GALLBLADDER SURGERY     • HERNIA REPAIR     • HYSTERECTOMY     • INGUINAL HERNIA REPAIR     • JOINT REPLACEMENT      BILATERAL KNEES, BILATERAL HIPS   • NECK SURGERY     • AL RECONSTR TOTAL SHOULDER IMPLANT Left 2/13/2017    Procedure: TOTAL SHOULDER REPLACEMENT LEFT;  Surgeon: Benigno Chavez MD;  Location: [facility];  Service: Orthopedics   • REPLACEMENT TOTAL KNEE     • SHOULDER LIGAMENT REPAIR Right    • TONSILLECTOMY     • TOTAL HIP ARTHROPLASTY     • UPPER GASTROINTESTINAL ENDOSCOPY        General Information     Row Name 04/09/22 1545          Physical Therapy Time and Intention    Document Type evaluation  -CB     Mode of Treatment individual therapy;physical therapy  -CB     Row Name 04/09/22 1545          General Information    Patient Profile Reviewed yes  -CB     Prior Level of Function independent:;gait;transfer;bed mobility  SPC since MEENAKSHI revision on 4/5  -CB     Existing Precautions/Restrictions fall;hip, anterior;weight bearing  -CB     Barriers to Rehab previous functional deficit  -CB     Row Name 04/09/22 1545          Living Environment    People in Home significant other  -CB     Row Name 04/09/22 1545          Home Main Entrance    Number of Stairs, Main Entrance two;three  -CB     Stair Railings, Main Entrance railing on right side (ascending)  -CB     Row Name 04/09/22 1545          Cognition    Orientation Status (Cognition) oriented x 4  -CB     Row Name 04/09/22 1545          Safety Issues, Functional Mobility    Impairments Affecting Function (Mobility) balance;endurance/activity tolerance;pain;range of motion (ROM);strength  -CB           User Key  (r) = Recorded By, (t) = Taken By, (c) = Cosigned By    Initials Name Provider Type    CB Patricia Em PT Physical Therapist               Mobility     Row Name 04/09/22 1546          Bed Mobility    Comment, (Bed Mobility) sitting EOB  -CB     Row Name 04/09/22 1546          Sit-Stand  Transfer    Sit-Stand [place] (Transfers) standby assist  -CB     Assistive Device (Sit-Stand Transfers) cane, straight  -CB     Row Name 04/09/22 1546          Gait/Stairs (Locomotion)    [place] Level (Gait) standby assist;contact guard  -CB     Assistive Device (Gait) cane, straight  -CB     Distance in Feet (Gait) 100ft  -CB     Deviations/Abnormal Patterns (Gait) gait speed decreased;stride length decreased  -CB     Bilateral Gait Deviations heel strike decreased  -CB     Comment, (Gait/Stairs) no LOB or unsteadiness noted  -CB     Row Name 04/09/22 1546          Mobility    Extremity Weight-bearing Status left lower extremity  -CB     Left Lower Extremity (Weight-bearing Status) weight-bearing as tolerated (WBAT)  -CB           User Key  (r) = Recorded By, (t) = Taken By, (c) = Cosigned By    Initials Name Provider Type    Patricia Ding, PT Physical Therapist               Obj/Interventions     Row Name 04/09/22 1547          Range of Motion Comprehensive    General Range of Motion lower extremity range of motion deficits identified  -[facility] Name 04/09/22 1547          Strength Comprehensive (MMT)    Comment, General Manual Muscle Testing (MMT) Assessment LLE weakness s/p L MEENAKSHI revision on 4/5 at [place]  -     Row Name 04/09/22 1547          Motor Skills    Therapeutic Exercise --  educated regarding MEENAKSHI protocol  -[facility] Name 04/09/22 1547          Balance    Balance Assessment sitting static balance;sitting dynamic balance;standing static balance;standing dynamic balance  -CB     Static Sitting Balance modified independence  -CB     Dynamic Sitting Balance modified independence  -CB     Position, Sitting Balance unsupported;sitting edge of bed  -CB     Static Standing Balance standby assist  -CB     Dynamic Standing Balance standby assist;contact guard  -CB     Position/Device Used, Standing Balance supported;cane, straight  -CB     Row Name 04/09/22 1547          Sensory Assessment  (Somatosensory)    Sensory Assessment (Somatosensory) sensation intact  -CB           User Key  (r) = Recorded By, (t) = Taken By, (c) = Cosigned By    Initials Name Provider Type    CB Patricia Em PT Physical Therapist               Goals/Plan     Row Name 04/09/22 1556          Bed Mobility Goal 1 (PT)    Activity/Assistive Device (Bed Mobility Goal 1, PT) bed mobility activities, all  -CB     [place] Level/Cues Needed (Bed Mobility Goal 1, PT) modified independence  -CB     Time Frame (Bed Mobility Goal 1, PT) long term goal (LTG);1 week  -CB     Row Name 04/09/22 1556          Transfer Goal 1 (PT)    Activity/Assistive Device (Transfer Goal 1, PT) sit-to-stand/stand-to-sit;bed-to-chair/chair-to-bed  -CB     [place] Level/Cues Needed (Transfer Goal 1, PT) modified independence  -CB     Time Frame (Transfer Goal 1, PT) long term goal (LTG);1 week  -CB     Row Name 04/09/22 1556          Gait Training Goal 1 (PT)    Activity/Assistive Device (Gait Training Goal 1, PT) gait (walking locomotion);cane, straight  -CB     [place] Level (Gait Training Goal 1, PT) standby assist  -CB     Distance (Gait Training Goal 1, PT) 150ft  -CB     Time Frame (Gait Training Goal 1, PT) long term goal (LTG);1 week  -CB           User Key  (r) = Recorded By, (t) = Taken By, (c) = Cosigned By    Initials Name Provider Type    CB Patricia Em, PT Physical Therapist               Clinical Impression     Row Name 04/09/22 1544          Pain    Pretreatment Pain Rating 7/10  -CB     Posttreatment Pain Rating 7/10  -CB     Pain Location - Side/Orientation Left  -CB     Pain Location - hip  -CB     Pain Intervention(s) Repositioned;Rest;Ambulation/increased activity  -CB     Row Name 04/09/22 1547          Plan of Care Review    Plan of Care Reviewed With patient  -CB     Progress no change  -CB     Outcome Evaluation Patient is a 71 yo female who presented with confusion. She has a L MEENAKSHI (anterior) revision on 4/5/2022  at [facility]. PMHx inlcudes HTN, sleep apnea, urinary incontinence. She reports since sx has been using SPC and lives with her partner. She has 2-3 CHRISTY with R handrails. She presents today with decreased activity tolerance, strength, ROM, and increased pain. She completed STS to SPC with SBA and ambulated 100ft using SPC requiring SBA/CGA. No LOB or unsteadiness noted. From a mobility standpoint pt is safe to discharge home but hesitant to return to home setting. Pt will continue to benefit from skilled PT to address functional deficits. PT rec home with partner and HHPT but pt interested in SNF.  -CB     Row Name 04/09/22 1548          Therapy Assessment/Plan (PT)    Rehab Potential (PT) good, to achieve stated therapy goals  -CB     Criteria for Skilled Interventions Met (PT) yes  -CB     Row Name 04/09/22 1545          Positioning and Restraints    Pre-Treatment Position in bed  -CB     Post Treatment Position chair  -CB     In Chair notified nsg;reclined;call light within reach;encouraged to call for assist;exit alarm on;with family/caregiver;legs elevated  -CB           User Key  (r) = Recorded By, (t) = Taken By, (c) = Cosigned By    Initials Name Provider Type    Patricia Ding, PT Physical Therapist               Outcome Measures     Row Name 04/09/22 7095          How much help from another person do you currently need...    Turning from your back to your side while in flat bed without using bedrails? 4  -CB     Moving from lying on back to sitting on the side of a flat bed without bedrails? 3  -CB     Moving to and from a bed to a chair (including a wheelchair)? 3  -CB     Standing up from a chair using your arms (e.g., wheelchair, bedside chair)? 3  -CB     Climbing 3-5 steps with a railing? 3  -CB     To walk in hospital room? 3  -CB     AM-PAC 6 Clicks Score (PT) 19  -CB     Row Name 04/09/22 1886          Functional Assessment    Outcome Measure Options AM-PAC 6 Clicks Basic Mobility (PT)  -CB            User Key  (r) = Recorded By, (t) = Taken By, (c) = Cosigned By    Initials Name Provider Type    CB Patricia Em PT Physical Therapist                             Physical Therapy Education                 Title: PT OT SLP Therapies (In Progress)     Topic: Physical Therapy (Done)     Point: Mobility training (Done)     Learning Progress Summary           Patient Acceptance, E,TB,D, VU,NR by CB at 4/9/2022 1556                   Point: Home exercise program (Done)     Learning Progress Summary           Patient Acceptance, E,TB,D, VU,NR by CB at 4/9/2022 1556                   Point: Body mechanics (Done)     Learning Progress Summary           Patient Acceptance, E,TB,D, VU,NR by CB at 4/9/2022 1556                   Point: Precautions (Done)     Learning Progress Summary           Patient Acceptance, E,TB,D, VU,NR by CB at 4/9/2022 1556                               User Key     Initials Effective Dates Name Provider Type Discipline     10/22/21 -  Patricia Em PT Physical Therapist PT              PT Recommendation and Plan  Planned Therapy Interventions (PT): balance training, bed mobility training, gait training, home exercise program, patient/family education, ROM (range of motion), transfer training, strengthening  Plan of Care Reviewed With: patient  Progress: no change  Outcome Evaluation: Patient is a 73 yo female who presented with confusion. She has a L MEENAKSHI (anterior) revision on 4/5/2022 at [place]. PMHx inlcudes HTN, sleep apnea, urinary incontinence. She reports since sx has been using SPC and lives with her partner. She has 2-3 CHRISTY with R handrails. She presents today with decreased activity tolerance, strength, ROM, and increased pain. She completed STS to SPC with SBA and ambulated 100ft using SPC requiring SBA/CGA. No LOB or unsteadiness noted. From a mobility standpoint pt is safe to discharge home but hesitant to return to home setting. Pt will continue to benefit from skilled PT to  address functional deficits. PT rec home with partner and HHPT but pt interested in SNF.     Time Calculation:    PT Charges     Row Name 04/09/22 1557             Time Calculation    Start Time 1345  -CB      Stop Time 1404  -CB      Time Calculation (min) 19 min  -CB      PT Received On 04/09/22  -CB      PT - Next Appointment 04/11/22  -CB      PT Goal Re-Cert Due Date 04/16/22  -CB              Time Calculation- PT    Total Timed Code Minutes- PT 9 minute(s)  -CB              Timed Charges    32600 - PT Therapeutic Activity Minutes 9  -CB              Total Minutes    Timed Charges Total Minutes 9  -CB       Total Minutes 9  -CB            User Key  (r) = Recorded By, (t) = Taken By, (c) = Cosigned By    Initials Name Provider Type    CB Patricia Em, PT Physical Therapist              Therapy Charges for Today     Code Description Service Date Service Provider Modifiers Qty    23123524862 HC PT THERAPEUTIC ACT EA 15 MIN 4/9/2022 Patricia Em, PT GP 1    [id number]  PT EVAL MOD COMPLEXITY 3 4/9/2022 Patricia Em, PT GP 1          PT G-Codes  Outcome Measure Options: AM-PAC 6 Clicks Basic Mobility (PT)  AM-PAC 6 Clicks Score (PT): 19  AM-PAC 6 Clicks Score (OT): 8  Modified [place] Scale: 4 - Moderately severe disability.  Unable to walk without assistance, and unable to attend to own bodily needs without assistance.    Patricia Em PT  4/9/2022

## 2022-04-10 LAB
ANION GAP SERPL CALCULATED.3IONS-SCNC: 10 MMOL/L (ref 5–15)
BUN SERPL-MCNC: 11 MG/DL (ref 8–23)
BUN/CREAT SERPL: 14.5 (ref 7–25)
CALCIUM SPEC-SCNC: 8.5 MG/DL (ref 8.6–10.5)
CHLORIDE SERPL-SCNC: 101 MMOL/L (ref 98–107)
CO2 SERPL-SCNC: 27 MMOL/L (ref 22–29)
CREAT SERPL-MCNC: 0.76 MG/DL (ref 0.57–1)
EGFRCR SERPLBLD CKD-EPI 2021: 82.9 ML/MIN/1.73
GLUCOSE SERPL-MCNC: 83 MG/DL (ref 65–99)
MAGNESIUM SERPL-MCNC: 1.9 MG/DL (ref 1.6–2.4)
PHOSPHATE SERPL-MCNC: 2.4 MG/DL (ref 2.5–4.5)
POTASSIUM SERPL-SCNC: 3.7 MMOL/L (ref 3.5–5.2)
SODIUM SERPL-SCNC: 138 MMOL/L (ref 136–145)

## 2022-04-10 PROCEDURE — 96372 THER/PROPH/DIAG INJ SC/IM: CPT

## 2022-04-10 PROCEDURE — 96376 TX/PRO/DX INJ SAME DRUG ADON: CPT

## 2022-04-10 PROCEDURE — 83735 ASSAY OF MAGNESIUM: CPT | Performed by: STUDENT IN AN ORGANIZED HEALTH CARE EDUCATION/TRAINING PROGRAM

## 2022-04-10 PROCEDURE — G0378 HOSPITAL OBSERVATION PER HR: HCPCS

## 2022-04-10 PROCEDURE — 84100 ASSAY OF PHOSPHORUS: CPT | Performed by: STUDENT IN AN ORGANIZED HEALTH CARE EDUCATION/TRAINING PROGRAM

## 2022-04-10 PROCEDURE — 25010000002 ENOXAPARIN PER 10 MG: Performed by: STUDENT IN AN ORGANIZED HEALTH CARE EDUCATION/TRAINING PROGRAM

## 2022-04-10 PROCEDURE — 80048 BASIC METABOLIC PNL TOTAL CA: CPT | Performed by: STUDENT IN AN ORGANIZED HEALTH CARE EDUCATION/TRAINING PROGRAM

## 2022-04-10 PROCEDURE — 63710000001 ONDANSETRON PER 8 MG: Performed by: NURSE PRACTITIONER

## 2022-04-10 PROCEDURE — 25010000002 ONDANSETRON PER 1 MG: Performed by: NURSE PRACTITIONER

## 2022-04-10 PROCEDURE — 96366 THER/PROPH/DIAG IV INF ADDON: CPT

## 2022-04-10 RX ADMIN — ACETAMINOPHEN 650 MG: 325 TABLET ORAL at 20:33

## 2022-04-10 RX ADMIN — PANTOPRAZOLE SODIUM 40 MG: 40 TABLET, DELAYED RELEASE ORAL at 18:22

## 2022-04-10 RX ADMIN — ACETAMINOPHEN 650 MG: 325 TABLET ORAL at 16:17

## 2022-04-10 RX ADMIN — CETIRIZINE HYDROCHLORIDE 10 MG: 10 TABLET ORAL at 09:25

## 2022-04-10 RX ADMIN — ONDANSETRON 4 MG: 2 INJECTION INTRAMUSCULAR; INTRAVENOUS at 20:33

## 2022-04-10 RX ADMIN — ACETAMINOPHEN 650 MG: 325 TABLET ORAL at 01:22

## 2022-04-10 RX ADMIN — PANTOPRAZOLE SODIUM 40 MG: 40 TABLET, DELAYED RELEASE ORAL at 06:34

## 2022-04-10 RX ADMIN — PREGABALIN 150 MG: 75 CAPSULE ORAL at 06:34

## 2022-04-10 RX ADMIN — OLANZAPINE 2.5 MG: 2.5 TABLET ORAL at 20:33

## 2022-04-10 RX ADMIN — PREGABALIN 150 MG: 75 CAPSULE ORAL at 18:22

## 2022-04-10 RX ADMIN — OXYBUTYNIN CHLORIDE 10 MG: 10 TABLET, EXTENDED RELEASE ORAL at 09:25

## 2022-04-10 RX ADMIN — VILAZODONE HYDROCHLORIDE 40 MG: 40 TABLET ORAL at 09:25

## 2022-04-10 RX ADMIN — CALCIUM CARBONATE-VITAMIN D TAB 500 MG-200 UNIT 1 TABLET: 500-200 TAB at 09:25

## 2022-04-10 RX ADMIN — SODIUM PHOSPHATE, MONOBASIC, MONOHYDRATE AND SODIUM PHOSPHATE, DIBASIC, ANHYDROUS 20 MMOL: 276; 142 INJECTION, SOLUTION INTRAVENOUS at 16:03

## 2022-04-10 RX ADMIN — LISINOPRIL 20 MG: 20 TABLET ORAL at 20:32

## 2022-04-10 RX ADMIN — LISINOPRIL 20 MG: 20 TABLET ORAL at 09:25

## 2022-04-10 RX ADMIN — ONDANSETRON HYDROCHLORIDE 4 MG: 4 TABLET, FILM COATED ORAL at 06:39

## 2022-04-10 RX ADMIN — ROSUVASTATIN CALCIUM 10 MG: 10 TABLET, FILM COATED ORAL at 09:25

## 2022-04-10 RX ADMIN — ENOXAPARIN SODIUM 40 MG: 100 INJECTION SUBCUTANEOUS at 20:33

## 2022-04-10 RX ADMIN — Medication 5 MG: at 20:33

## 2022-04-10 NOTE — PROGRESS NOTES
Name: Jaylyn Fuentes ADMIT: 2022   : 1950  PCP: Annie Romero MD    MRN: [id number] LOS: 0 days   AGE/SEX: 72 y.o. female  ROOM: [facility]/     Subjective   Subjective    She feels better today. She feels stronger. Appetite is improved. She does not think she is safe to go home. Her partner is taking care of her sister who according to the patient is in the hospital.    Review of Systems   Constitutional: Negative for fever.   Respiratory: Negative for cough and shortness of breath.    Cardiovascular: Negative for chest pain.   Gastrointestinal: Negative for abdominal pain, diarrhea, nausea and vomiting.   Genitourinary: Negative for dysuria.   Neurological: Negative for headaches.      Objective   Objective   Vital Signs  Temp:  [98.1 °F (36.7 °C)-98.9 °F (37.2 °C)] 98.1 °F (36.7 °C)  Heart Rate:  [56-93] 56  Resp:  [16-18] 16  BP: (135-181)/(68-92) 181/90  SpO2:  [90 %-95 %] 90 %  on   ;   Device (Oxygen Therapy): room air  Body mass index is 37.46 kg/m².     Physical Exam  Constitutional:       General: She is not in acute distress.     Appearance: She is not diaphoretic.   HENT:      Head: Normocephalic and atraumatic.   Cardiovascular:      Rate and Rhythm: Normal rate and regular rhythm.   Pulmonary:      Effort: Pulmonary effort is normal. No respiratory distress.      Breath sounds: No wheezing, rhonchi or rales.   Abdominal:      Palpations: Abdomen is soft.      Tenderness: There is no abdominal tenderness. There is no guarding.   Musculoskeletal:      Right lower leg: No edema.      Left lower leg: No edema.   Skin:     General: Skin is warm and dry.   Neurological:      Mental Status: She is alert and oriented to person, place, and time. Mental status is at baseline.   Psychiatric:         Mood and Affect: Mood normal.         Behavior: Behavior normal.     Results Review     I reviewed the patient's new clinical results.  Results from last 7 days   Lab Units 22  0249  04/08/22  0201 04/06/22  0938   WBC 10*3/mm3 8.79 10.09 11.51*   HEMOGLOBIN g/dL 11.2* 11.6* 11.0*   PLATELETS 10*3/mm3 241 228 289     Results from last 7 days   Lab Units 04/10/22  0609 04/09/22 2056 04/09/22  0543 04/08/22  0201   SODIUM mmol/L 138  --  140 140   POTASSIUM mmol/L 3.7 4.3 3.3* 3.7   CHLORIDE mmol/L 101  --  102 102   CO2 mmol/L 27.0  --  28.0 26.3   BUN mg/dL 11  --  9 10   CREATININE mg/dL 0.76  --  0.63 0.75   GLUCOSE mg/dL 83  --  86 120*   Estimated Creatinine Clearance: 72.6 mL/min (by C-G formula based on SCr of 0.76 mg/dL).  Results from last 7 days   Lab Units 04/08/22  0201   ALBUMIN g/dL 3.90   BILIRUBIN mg/dL 0.8   ALK PHOS U/L 79   AST (SGOT) U/L 33*   ALT (SGPT) U/L 11     Results from last 7 days   Lab Units 04/10/22  0609 04/09/22  0543 04/08/22  0201   CALCIUM mg/dL 8.5* 8.8 9.0   ALBUMIN g/dL  --   --  3.90   MAGNESIUM mg/dL 1.9 2.1  --    PHOSPHORUS mg/dL 2.4* 1.7*  --      Results from last 7 days   Lab Units 04/08/22  0201   PROCALCITONIN ng/mL 0.03   LACTATE mmol/L 1.3     COVID19   Date Value Ref Range Status   04/08/2022 Not Detected Not Detected - Ref. Range Final   11/12/2021 Not Detected Not Detected - Ref. Range Final     Scheduled Meds  calcium-vitamin D, 1 tablet, Oral, Daily  cetirizine, 10 mg, Oral, Daily  enoxaparin, 40 mg, Subcutaneous, Nightly  lisinopril, 20 mg, Oral, BID  melatonin, 5 mg, Oral, Nightly  OLANZapine, 2.5 mg, Oral, Nightly  oxybutynin XL, 10 mg, Oral, Daily  pantoprazole, 40 mg, Oral, BID AC  pregabalin, 150 mg, Oral, Q PM  pregabalin, 150 mg, Oral, QAM  rosuvastatin, 10 mg, Oral, Daily  vilazodone, 40 mg, Oral, Daily    Continuous Infusions   PRN Meds  •  acetaminophen  •  albuterol sulfate HFA  •  aluminum-magnesium hydroxide-simethicone  •  diphenoxylate-atropine  •  HYDROcodone-acetaminophen  •  magnesium sulfate **OR** magnesium sulfate **OR** magnesium sulfate  •  nitroglycerin  •  ondansetron **OR** ondansetron  •  potassium chloride  •   potassium chloride  •  potassium phosphate infusion greater than 15 mMoles **OR** potassium phosphate infusion greater than 15 mMoles **OR** potassium phosphate **OR** sodium phosphate IVPB **OR** sodium phosphate IVPB  •  [COMPLETED] Insert peripheral IV **AND** sodium chloride  •  sodium chloride      Diet  Diet Soft Texture; Ground; Thin       Assessment/Plan     Active Hospital Problems    Diagnosis  POA   • **Encephalopathy, toxic [G92.9]  Yes   • Altered mental status [R41.82]  Yes   • Dehydration [E86.0]  Yes   • Polypharmacy [Z79.899]  Not Applicable   • Chronic prescription opiate use [Z79.891]  Not Applicable   • Urinary incontinence [R32]  Yes   • SIN (obstructive sleep apnea) [G47.33]  Yes   • Obesity (BMI 30-39.9) [E66.9]  Yes   • Hypertension [I10]  Yes      Resolved Hospital Problems   No resolved problems to display.       72 y.o. female with a history of migraines and chronic pain on prescription opiates who presented with poor p.o. intake and altered mental status following a surgery 3 days prior.     Her mental status is back to baseline.      Toxic encephalopathy:  · Resolved.   · Most likely due to tramadol and oxycodone in the setting of poor p.o. intake following surgery  · Stopped tramadol, changed oxycodone to her home Norco at a lower dose. home Lyrica at a lower dose     Recent left hip surgery:  · had a drain prior to admission; partner says it fell out.    · Wound care evaluated, removed.   · FU with [facility]     Hypertension: Blood pressure acceptable acutely.  Resume home lisinopril     Urinary incontinence: Takes the Viibryd and Toviaz at home.  Oxybutynin while here.  Monitor for urinary retention     Obstructive sleep apnea: Resume home CPAP    · lovenox for DVT prophylaxis.  · Full code.  · Discussed with patient and nursing staff.  · Anticipate discharge: Patient does not feel safe going home however (partner taking care of her sister). She would like to go to SNF. She  ambulated 100 feet with PT and I discussed with her may not be covered    Stephane Randall MD  [facility]  04/10/22  11:52 EDT

## 2022-04-10 NOTE — PLAN OF CARE
Goal Outcome Evaluation:  Plan of Care Reviewed With: patient        Progress: improving  Outcome Evaluation: No acute changes tonight. PRN Tylenol given x2 for headache with relief. Phosphorus replaced - to be rechecked this AM. Ambulated once around hallway. Fall precautions in place. Slept intermittently tonight. Will continue to monitor.

## 2022-04-10 NOTE — PLAN OF CARE
Goal Outcome Evaluation:               VSS. A&O*4 .Up walking the [place] with nursing staff  & family with a cane,required minimal assist.Phos replaced , to recheck in AM. CCP to follow up with discharge planning tomorrow.NICHO

## 2022-04-11 VITALS
DIASTOLIC BLOOD PRESSURE: 81 MMHG | OXYGEN SATURATION: 94 % | WEIGHT: 211.42 LBS | RESPIRATION RATE: 18 BRPM | HEIGHT: 63 IN | HEART RATE: 65 BPM | SYSTOLIC BLOOD PRESSURE: 160 MMHG | BODY MASS INDEX: 37.46 KG/M2 | TEMPERATURE: 98.3 F

## 2022-04-11 LAB
ANION GAP SERPL CALCULATED.3IONS-SCNC: 10.8 MMOL/L (ref 5–15)
BUN SERPL-MCNC: 12 MG/DL (ref 8–23)
BUN/CREAT SERPL: 13 (ref 7–25)
CALCIUM SPEC-SCNC: 8.7 MG/DL (ref 8.6–10.5)
CHLORIDE SERPL-SCNC: 102 MMOL/L (ref 98–107)
CO2 SERPL-SCNC: 28.2 MMOL/L (ref 22–29)
CREAT SERPL-MCNC: 0.92 MG/DL (ref 0.57–1)
EGFRCR SERPLBLD CKD-EPI 2021: 65.9 ML/MIN/1.73
GLUCOSE SERPL-MCNC: 94 MG/DL (ref 65–99)
PHOSPHATE SERPL-MCNC: 4.2 MG/DL (ref 2.5–4.5)
POTASSIUM SERPL-SCNC: 3.6 MMOL/L (ref 3.5–5.2)
SODIUM SERPL-SCNC: 141 MMOL/L (ref 136–145)

## 2022-04-11 PROCEDURE — 80048 BASIC METABOLIC PNL TOTAL CA: CPT | Performed by: HOSPITALIST

## 2022-04-11 PROCEDURE — G0378 HOSPITAL OBSERVATION PER HR: HCPCS

## 2022-04-11 PROCEDURE — 25010000002 ONDANSETRON PER 1 MG: Performed by: NURSE PRACTITIONER

## 2022-04-11 PROCEDURE — 84100 ASSAY OF PHOSPHORUS: CPT | Performed by: HOSPITALIST

## 2022-04-11 PROCEDURE — 96376 TX/PRO/DX INJ SAME DRUG ADON: CPT

## 2022-04-11 RX ORDER — HYDROCODONE BITARTRATE AND ACETAMINOPHEN 5; 325 MG/1; MG/1
1 TABLET ORAL EVERY 4 HOURS PRN
Qty: 10 TABLET | Refills: 0 | Status: SHIPPED | OUTPATIENT
Start: 2022-04-11

## 2022-04-11 RX ORDER — PREGABALIN 150 MG/1
150 CAPSULE ORAL EVERY MORNING
Start: 2022-04-11

## 2022-04-11 RX ADMIN — PREGABALIN 150 MG: 75 CAPSULE ORAL at 06:45

## 2022-04-11 RX ADMIN — CALCIUM CARBONATE-VITAMIN D TAB 500 MG-200 UNIT 1 TABLET: 500-200 TAB at 08:49

## 2022-04-11 RX ADMIN — CETIRIZINE HYDROCHLORIDE 10 MG: 10 TABLET ORAL at 08:49

## 2022-04-11 RX ADMIN — OXYBUTYNIN CHLORIDE 10 MG: 10 TABLET, EXTENDED RELEASE ORAL at 08:49

## 2022-04-11 RX ADMIN — VILAZODONE HYDROCHLORIDE 40 MG: 40 TABLET ORAL at 08:49

## 2022-04-11 RX ADMIN — ONDANSETRON 4 MG: 2 INJECTION INTRAMUSCULAR; INTRAVENOUS at 08:53

## 2022-04-11 RX ADMIN — ROSUVASTATIN CALCIUM 10 MG: 10 TABLET, FILM COATED ORAL at 08:49

## 2022-04-11 RX ADMIN — PANTOPRAZOLE SODIUM 40 MG: 40 TABLET, DELAYED RELEASE ORAL at 06:46

## 2022-04-11 RX ADMIN — LISINOPRIL 20 MG: 20 TABLET ORAL at 08:49

## 2022-04-11 RX ADMIN — ACETAMINOPHEN 650 MG: 325 TABLET ORAL at 08:03

## 2022-04-11 RX ADMIN — Medication 10 ML: at 08:49

## 2022-04-11 NOTE — PLAN OF CARE
Goal Outcome Evaluation:  Plan of Care Reviewed With: patient        Progress: improving  Outcome Evaluation: Phosphorus replaced, recheck in AM. VSS, will continue to monitor. CCP to f/u d/c plan.

## 2022-04-11 NOTE — PROGRESS NOTES
Discharge Planning Assessment  [facility]     Patient Name: Jaylyn Fuentes  MRN: [id number]  Today's Date: 4/11/2022    Admit Date: 4/8/2022     Discharge Needs Assessment    No documentation.                Discharge Plan     Row Name 04/11/22 0928       Plan    Plan New referrals to [facility] and [facility]    Plan Comments Spoke with patient at bedside regarding discharge planning, patient stated she would like to go to skilled rehab at discharge stating she will be at home alone and feels it would be unsafe for her to go home. Patient chose [facility] and [facility] new referrals placed in [facility]. Patient stated her partner Sarah can transport to rehab. Mylene FLORES              Continued Care and Services - Admitted Since 4/8/2022     Destination     Service Provider Request Status Selected Services Address Phone Fax Patient Preferred    [facility]  Pending - Request Sent N/A [address] [place] [address] [phone] --    [facility]  Pending - Request Sent N/A [address] [address] [phone] --    [facility]  Pending - Request Sent N/A [address] [address] [phone] --              Expected Discharge Date and Time     Expected Discharge Date Expected Discharge Time    Apr 10, 2022          Demographic Summary    No documentation.                Functional Status    No documentation.                Psychosocial    No documentation.                Abuse/Neglect    No documentation.                Legal    No documentation.                Substance Abuse    No documentation.                Patient Forms    No documentation.                   Tali Gonzalez, RN

## 2022-04-11 NOTE — DISCHARGE SUMMARY
[facility]    Date of Discharge:  4/11/2022    PCP: Annie Romero MD    Discharge Diagnosis:   Active Hospital Problems    Diagnosis  POA   • **Encephalopathy, toxic [G92.9]  Yes   • Altered mental status [R41.82]  Yes   • Dehydration [E86.0]  Yes   • Polypharmacy [Z79.899]  Not Applicable   • Chronic prescription opiate use [Z79.891]  Not Applicable   • Urinary incontinence [R32]  Yes   • SIN (obstructive sleep apnea) [G47.33]  Yes   • Obesity (BMI 30-39.9) [E66.9]  Yes   • Hypertension [I10]  Yes      Resolved Hospital Problems   No resolved problems to display.          Consults     Date and Time Order Name Status Description    4/8/2022  5:10 AM [facility]NA (on-call MD unless specified) Details          Hospital Course  72 y.o. female recently had hip surgery at [facility] facility afterwards at home had poor p.o. intake and progression of confusion. She was admitted for toxic encephalopathy most likely due to tramadol and oxycodone in the setting of poor p.o. intake following surgery. Tramadol and oxycodone were discontinued. She was resumed on her home hydrocodone at a lower dose. Her Lyrica was also resumed at a lower dose. Her confusion resolved and she is back to baseline mental status.     She had some mild hypokalemia and moderate hypophosphatemia and both were corrected. She also had some ketones in urine probably some starvation ketosis. Anion gap and lactate and procalcitonin were not elevated.    Initially she was concerned about safety at home and was interested in SNF placement. She was seen by PT. She ambulated 100 feet with them. PT did recommend home health PT. After discussion with CCP patient elected to go home with home health.    Regarding her left hip surgery she had a drain prior to admission and it reportedly fell out. Patient was evaluated by wound care. Patient plans to follow-up with orthopedic surgery at [facility].    I discussed the  patient's findings and my recommendations with patient and nursing staff and CCP.    Condition on Discharge: Improved. Patient would like to go home with home health today.    Temp:  [98.3 °F (36.8 °C)-98.6 °F (37 °C)] 98.3 °F (36.8 °C)  Heart Rate:  [57-76] 65  Resp:  [16-18] 18  BP: (117-160)/(65-81) 160/81  Body mass index is 37.46 kg/m².    Physical Exam  Constitutional:       General: She is not in acute distress.     Appearance: Normal appearance. She is not toxic-appearing.   HENT:      Head: Normocephalic and atraumatic.      Right Ear: External ear normal.      Left Ear: External ear normal.      Nose: Nose normal.   Eyes:      Conjunctiva/sclera: Conjunctivae normal.   Cardiovascular:      Rate and Rhythm: Normal rate and regular rhythm.   Pulmonary:      Effort: Pulmonary effort is normal. No respiratory distress.      Breath sounds: Normal breath sounds. No wheezing, rhonchi or rales.   Abdominal:      General: Bowel sounds are normal. There is no distension.      Palpations: Abdomen is soft.      Tenderness: There is no abdominal tenderness. There is no guarding or rebound.   Musculoskeletal:         General: No swelling.      Right lower leg: No edema.      Left lower leg: No edema.   Skin:     General: Skin is warm and dry.   Neurological:      General: No focal deficit present.      Mental Status: She is alert and oriented to person, place, and time.   Psychiatric:         Mood and Affect: Mood normal.         Behavior: Behavior normal.         Thought Content: Thought content normal.       Disposition: Home or Self Care       Discharge Medications      New Medications      Instructions Start Date   HYDROcodone-acetaminophen 5-325 MG per tablet  Commonly known as: NORCO  Replaces: HYDROcodone-acetaminophen  MG per tablet   1 tablet, Oral, Every 4 Hours PRN         Changes to Medications      Instructions Start Date   pregabalin 150 MG capsule  Commonly known as: LYRICA  What changed: Another  medication with the same name was changed. Make sure you understand how and when to take each.   150 mg, Oral, Every Evening      pregabalin 150 MG capsule  Commonly known as: LYRICA  What changed: how much to take   150 mg, Oral, Every Morning         Continue These Medications      Instructions Start Date   albuterol sulfate  (90 Base) MCG/ACT inhaler  Commonly known as: PROVENTIL HFA;VENTOLIN HFA;PROAIR HFA   2 puffs, Inhalation, Every 6 Hours PRN      calcium carbonate-cholecalciferol 500-400 MG-UNIT tablet tablet   1 tablet, Oral, Daily      cetirizine 10 MG tablet  Commonly known as: zyrTEC   10 mg, Oral, Daily      Dexilant 60 MG capsule  Generic drug: dexlansoprazole   1 capsule, Oral, Daily      diphenoxylate-atropine 2.5-0.025 MG per tablet  Commonly known as: LOMOTIL   1 tablet, Oral, 4 Times Daily PRN      fesoterodine fumarate 8 MG tablet sustained-release 24 hour tablet  Commonly known as: TOVIAZ ER   8 mg, Oral, Every 24 Hours Scheduled      lisinopril 20 MG tablet  Commonly known as: PRINIVIL,ZESTRIL   20 mg, Oral, 2 Times Daily      OLANZapine 2.5 MG tablet  Commonly known as: zyPREXA   2.5 mg, Oral, Nightly      ondansetron 8 MG tablet  Commonly known as: ZOFRAN   8 mg, Oral, Every 8 Hours PRN      rosuvastatin 10 MG tablet  Commonly known as: CRESTOR   10 mg, Oral, Daily      SUMAtriptan 5 MG/ACT nasal spray  Commonly known as: IMITREX   5 mg, Nasal, Every 2 Hours PRN      tolterodine 1 MG tablet  Commonly known as: DETROL   1 mg, Oral, 2 Times Daily      vilazodone 40 MG tablet tablet  Commonly known as: VIIBRYD   40 mg, Oral, Daily      ZOLMitriptan 5 MG tablet  Commonly known as: ZOMIG   5 mg, Oral, Once As Needed         Stop These Medications    Bismuth 262 MG chewable tablet     HYDROcodone-acetaminophen  MG per tablet  Commonly known as: NORCO  Replaced by: HYDROcodone-acetaminophen 5-325 MG per tablet           Diet Instructions     Diet: Regular, Soft Texture; Thin Liquids,  No Restrictions; Ground      Discharge Diet:  Regular  Soft Texture       Fluid Consistency: Thin Liquids, No Restrictions    Soft Options: Ground         Activity Instructions     Activity as Tolerated      As per orthopedic surgery previous recommendations         Additional Instructions for the Follow-ups that You Need to Schedule     Ambulatory Referral to Home Health (Hospital)   As directed      Face to Face Visit Date: 4/11/2022    Follow-up provider for Plan of Care?: I treated the patient in an acute care facility and will not continue treatment after discharge.    Follow-up provider: ANNIE ROMERO [[id number]]    Reason/Clinical Findings: hip fx    Describe mobility limitations that make leaving home difficult: hip fx    Nursing/Therapeutic Services Requested: Physical Therapy Skilled Nursing    Skilled nursing orders: Medication education    PT orders: Total joint pathway    Frequency: 1 Week 1         Call MD for problems / concerns.   As directed         Follow-up Information     Annie Romero MD Follow up in 1 week(s).    Specialty: Internal Medicine  Contact information:  [address] [facility]  [address]  [facility] [id number] [phone]             Terence Arana MD. Schedule an appointment as soon as possible for a visit.    Specialty: Orthopedic Surgery  Contact information:  [address]  [address]  [facility] [id number] [phone]                        Pending Labs     Order Current Status    Blood Culture - Blood, Arm, Left Preliminary result    Blood Culture - Blood, Arm, Right Preliminary result         Stephane Randall MD  04/11/22  10:00 EDT    Discharge time spent greater than 30 minutes.

## 2022-04-11 NOTE — PROGRESS NOTES
Discharge Planning Assessment  [facility]     Patient Name: Jaylyn Fuentes  MRN: [id number]  Today's Date: 4/11/2022    Admit Date: 4/8/2022     Discharge Needs Assessment    No documentation.                Discharge Plan     Row Name 04/11/22 0941       Plan    Plan Return home with [facility]    Plan Comments Patient and Sarah have changed plans they would now like the patient to return with home health. Sarah will transport home. Mylene FLORES    Row Name 04/11/22 0928       Plan    Plan New referrals to [facility], [facility] and [facility]    Plan Comments Spoke with patient at bedside regarding discharge planning, patient stated she would like to go to skilled rehab at discharge stating she will be at home alone and feels it would be unsafe for her to go home. Patient chose [facility], [facility] and [facility] new referrals placed in [facility]. Patient stated her partner Sarah can transport to rehab. Mylene FLORES              Continued Care and Services - Admitted Since 4/8/2022     Destination     Service Provider Request Status Selected Services Address Phone Fax Patient Preferred    [facility]  Pending - Request Sent N/A [address] [facility] [address] [address] [phone] --    [facility]  P[facility] - [facility] N/A [address] [address] [phone] --    [facility]  Pending - Request Sent N/A [address] [address] [phone] --              Expected Discharge Date and Time     Expected Discharge Date Expected Discharge Time    Apr 10, 2022          Demographic Summary    No documentation.                Functional Status    No documentation.                Psychosocial    No documentation.                Abuse/Neglect    No documentation.                Legal    No documentation.                Substance Abuse    No  documentation.                Patient Forms    No documentation.                   Tali Gonzalez RN

## 2022-04-12 NOTE — DISCHARGE PLACEMENT REQUEST
"Jaylyn Fuentes (72 y.o. Female)             Date of Birth   1950    Social Security Number       Address   [address] [place] [address]    Home Phone   [phone]    MRN   [id number]       Buddhist   Sabianism    Marital Status   Single                            Admission Date   4/8/22    Admission Type   Emergency    Admitting Provider   Monik Ulrich DO    Attending Provider       Department, Room/Bed   [address], E463/1       Discharge Date   4/11/2022    Discharge Disposition   Home or Self Care    Discharge Destination                               Attending Provider: (none)   Allergies: Baclofen, Keflex [Cephalexin], Melatonin, Sulfa Antibiotics, Tramadol    Isolation: None   Infection: None   Code Status: Prior   Advance Care Planning Activity    Ht: 160 cm (62.99\")   Wt: 95.9 kg (211 lb 6.7 oz)    Admission Cmt: None   Principal Problem: Encephalopathy, toxic [G92.9]                 Active Insurance as of 4/8/2022     Primary Coverage     Payor Plan Insurance Group Employer/Plan Group    [facility] MEDICARE REPLACEMENT [facility] MEDICARE REPLACEMENT [id number]     Payor Plan Address Payor Plan Phone Number Payor Plan Fax Number Effective Dates    [address] [address]   1/1/2016 - None Entered    [facility] [id number]       Subscriber Name Subscriber Birth Date Member ID       JAYLYN FUENTES 1950 [id number]                 Emergency Contacts      (Rel.) Home Phone Work Phone Mobile Phone    Yen(POA)Sarah (Partner) [phone] -- --    Tiffanie Gonzalez (Sister) [phone] -- --              "

## 2022-04-12 NOTE — CASE MANAGEMENT/SOCIAL WORK
Case Management Discharge Note      Final Note: Home with sig other and [facility] . Transport by private auto         Selected Continued Care - Discharged on 4/11/2022 Admission date: 4/8/2022 - Discharge disposition: Home or Self Care    Destination    No services have been selected for the patient.              Durable Medical Equipment    No services have been selected for the patient.              Dialysis/Infusion    No services have been selected for the patient.              Home Medical Care Coordination complete.    Service Provider Selected Services Address Phone Fax Patient Preferred    [facility]  Home Health Services [address] [place] [address] [phone] --          Therapy    No services have been selected for the patient.              Community Resources    No services have been selected for the patient.              Community & DME    No services have been selected for the patient.                  Transportation Services  Private: Car    Final Discharge Disposition Code: 06 - home with home health care

## 2022-04-13 LAB
BACTERIA SPEC AEROBE CULT: NORMAL
BACTERIA SPEC AEROBE CULT: NORMAL

## 2022-10-03 ENCOUNTER — HOSPITAL ENCOUNTER (EMERGENCY)
Facility: HOSPITAL | Age: 72
Discharge: HOME | End: 2022-10-03
Attending: EMERGENCY MEDICINE | Admitting: EMERGENCY MEDICINE

## 2022-10-03 ENCOUNTER — APPOINTMENT (OUTPATIENT)
Dept: GENERAL RADIOLOGY | Facility: HOSPITAL | Age: 72
End: 2022-10-03

## 2022-10-03 VITALS
SYSTOLIC BLOOD PRESSURE: 178 MMHG | TEMPERATURE: 98.2 F | OXYGEN SATURATION: 92 % | RESPIRATION RATE: 16 BRPM | DIASTOLIC BLOOD PRESSURE: 97 MMHG | HEART RATE: 81 BPM

## 2022-10-03 DIAGNOSIS — R11.2 NAUSEA AND VOMITING, UNSPECIFIED VOMITING TYPE: Primary | ICD-10-CM

## 2022-10-03 LAB
ALBUMIN SERPL-MCNC: 4.2 G/DL (ref 3.5–5.2)
ALBUMIN/GLOB SERPL: 1.6 G/DL
ALP SERPL-CCNC: 83 U/L (ref 39–117)
ALT SERPL W P-5'-P-CCNC: 9 U/L (ref 1–33)
ANION GAP SERPL CALCULATED.3IONS-SCNC: 13.4 MMOL/L (ref 5–15)
AST SERPL-CCNC: 18 U/L (ref 1–32)
BASOPHILS # BLD AUTO: 0.03 10*3/MM3 (ref 0–0.2)
BASOPHILS NFR BLD AUTO: 0.5 % (ref 0–1.5)
BILIRUB SERPL-MCNC: 0.5 MG/DL (ref 0–1.2)
BUN SERPL-MCNC: 11 MG/DL (ref 8–23)
BUN/CREAT SERPL: 11.7 (ref 7–25)
CALCIUM SPEC-SCNC: 8.9 MG/DL (ref 8.6–10.5)
CHLORIDE SERPL-SCNC: 102 MMOL/L (ref 98–107)
CO2 SERPL-SCNC: 26.6 MMOL/L (ref 22–29)
CREAT SERPL-MCNC: 0.94 MG/DL (ref 0.57–1)
DEPRECATED RDW RBC AUTO: 51 FL (ref 37–54)
EGFRCR SERPLBLD CKD-EPI 2021: 64.2 ML/MIN/1.73
EOSINOPHIL # BLD AUTO: 0.05 10*3/MM3 (ref 0–0.4)
EOSINOPHIL NFR BLD AUTO: 0.8 % (ref 0.3–6.2)
ERYTHROCYTE [DISTWIDTH] IN BLOOD BY AUTOMATED COUNT: 15.7 % (ref 12.3–15.4)
GLOBULIN UR ELPH-MCNC: 2.6 GM/DL
GLUCOSE SERPL-MCNC: 109 MG/DL (ref 65–99)
HCT VFR BLD AUTO: 38.8 % (ref 34–46.6)
HGB BLD-MCNC: 12.4 G/DL (ref 12–15.9)
IMM GRANULOCYTES # BLD AUTO: 0.02 10*3/MM3 (ref 0–0.05)
IMM GRANULOCYTES NFR BLD AUTO: 0.3 % (ref 0–0.5)
LYMPHOCYTES # BLD AUTO: 0.86 10*3/MM3 (ref 0.7–3.1)
LYMPHOCYTES NFR BLD AUTO: 14.5 % (ref 19.6–45.3)
MCH RBC QN AUTO: 28.2 PG (ref 26.6–33)
MCHC RBC AUTO-ENTMCNC: 32 G/DL (ref 31.5–35.7)
MCV RBC AUTO: 88.4 FL (ref 79–97)
MONOCYTES # BLD AUTO: 0.34 10*3/MM3 (ref 0.1–0.9)
MONOCYTES NFR BLD AUTO: 5.7 % (ref 5–12)
NEUTROPHILS NFR BLD AUTO: 4.65 10*3/MM3 (ref 1.7–7)
NEUTROPHILS NFR BLD AUTO: 78.2 % (ref 42.7–76)
NRBC BLD AUTO-RTO: 0 /100 WBC (ref 0–0.2)
PLATELET # BLD AUTO: 293 10*3/MM3 (ref 140–450)
PMV BLD AUTO: 9 FL (ref 6–12)
POTASSIUM SERPL-SCNC: 3.6 MMOL/L (ref 3.5–5.2)
PROT SERPL-MCNC: 6.8 G/DL (ref 6–8.5)
RBC # BLD AUTO: 4.39 10*6/MM3 (ref 3.77–5.28)
SODIUM SERPL-SCNC: 142 MMOL/L (ref 136–145)
WBC NRBC COR # BLD: 5.95 10*3/MM3 (ref 3.4–10.8)

## 2022-10-03 PROCEDURE — 85025 COMPLETE CBC W/AUTO DIFF WBC: CPT | Performed by: EMERGENCY MEDICINE

## 2022-10-03 PROCEDURE — 25010000002 MORPHINE PER 10 MG: Performed by: EMERGENCY MEDICINE

## 2022-10-03 PROCEDURE — 71045 X-RAY EXAM CHEST 1 VIEW: CPT

## 2022-10-03 PROCEDURE — 25010000002 DROPERIDOL PER 5 MG: Performed by: EMERGENCY MEDICINE

## 2022-10-03 PROCEDURE — 96375 TX/PRO/DX INJ NEW DRUG ADDON: CPT

## 2022-10-03 PROCEDURE — 36415 COLL VENOUS BLD VENIPUNCTURE: CPT

## 2022-10-03 PROCEDURE — 80053 COMPREHEN METABOLIC PANEL: CPT | Performed by: EMERGENCY MEDICINE

## 2022-10-03 PROCEDURE — 99284 EMERGENCY DEPT VISIT MOD MDM: CPT

## 2022-10-03 PROCEDURE — 96374 THER/PROPH/DIAG INJ IV PUSH: CPT

## 2022-10-03 PROCEDURE — 25010000002 DIPHENHYDRAMINE PER 50 MG: Performed by: EMERGENCY MEDICINE

## 2022-10-03 RX ORDER — DROPERIDOL 2.5 MG/ML
2.5 INJECTION, SOLUTION INTRAMUSCULAR; INTRAVENOUS ONCE
Status: COMPLETED | OUTPATIENT
Start: 2022-10-03 | End: 2022-10-03

## 2022-10-03 RX ORDER — ACETAMINOPHEN 500 MG
1000 TABLET ORAL ONCE
Status: DISCONTINUED | OUTPATIENT
Start: 2022-10-03 | End: 2022-10-03 | Stop reason: HOSPADM

## 2022-10-03 RX ORDER — ONDANSETRON HYDROCHLORIDE 8 MG/1
8 TABLET, FILM COATED ORAL EVERY 8 HOURS PRN
Qty: 15 TABLET | Refills: 0 | Status: SHIPPED | OUTPATIENT
Start: 2022-10-03

## 2022-10-03 RX ORDER — MORPHINE SULFATE 2 MG/ML
2 INJECTION, SOLUTION INTRAMUSCULAR; INTRAVENOUS ONCE
Status: COMPLETED | OUTPATIENT
Start: 2022-10-03 | End: 2022-10-03

## 2022-10-03 RX ORDER — DIPHENHYDRAMINE HYDROCHLORIDE 50 MG/ML
50 INJECTION INTRAMUSCULAR; INTRAVENOUS ONCE
Status: COMPLETED | OUTPATIENT
Start: 2022-10-03 | End: 2022-10-03

## 2022-10-03 RX ADMIN — MORPHINE SULFATE 2 MG: 2 INJECTION, SOLUTION INTRAMUSCULAR; INTRAVENOUS at 09:58

## 2022-10-03 RX ADMIN — SODIUM CHLORIDE 1000 ML: 9 INJECTION, SOLUTION INTRAVENOUS at 09:54

## 2022-10-03 RX ADMIN — DROPERIDOL 2.5 MG: 2.5 INJECTION, SOLUTION INTRAMUSCULAR; INTRAVENOUS at 09:56

## 2022-10-03 RX ADMIN — DIPHENHYDRAMINE HYDROCHLORIDE 50 MG: 50 INJECTION, SOLUTION INTRAMUSCULAR; INTRAVENOUS at 11:01

## 2022-10-03 NOTE — ED TRIAGE NOTES
Nausea, abd pain, diarrhea and dizziness after eating out Friday night.  Her abd pain feels like a tightness that goes around upper abd and lower ribs    Patient was placed in face mask during first look triage.  Patient was wearing a face mask throughout encounter.  I wore personal protective equipment throughout the encounter.  Hand hygiene was performed before and after patient encounter.

## 2022-10-03 NOTE — ED PROVIDER NOTES
EMERGENCY DEPARTMENT ENCOUNTER    Room Number:  11/11  Date of encounter:  10/3/2022  PCP: Annie Romero MD  Historian: Patient, sister at bedside    I used full protective equipment while examining this patient.  This includes face mask, gloves and protective eyewear.  I washed my hands before entering the room and immediately upon leaving the room      HPI:  Chief Complaint: Nausea vomiting diarrhea  A complete HPI/ROS/PMH/PSH/SH/FH are unobtainable due to: None    Context: Jaylyn Fuentes is a 72 y.o. female who presents to the ED c/o nausea vomiting and diarrhea.  Patient presents with vomiting and diarrhea onset 2 days ago.  She thinks symptoms may be related to eating some chicken wings.  She has had moderate vomiting and moderate diarrhea.  Symptoms are worsened by eating, improved by nothing.  She denies significant abdominal pain.  No known fever although she has had some chills and sweats.  Patient does report prior abdominal surgeries including cholecystectomy, appendectomy and hysterectomy.      MEDICAL RECORD REVIEW  I reviewed prior medical records note the patient has a history of multiple medical problems including hypertension, depression and hyperlipidemia.  She does have a history of chronic pain syndrome and takes hydrocodone on a regular basis.    PAST MEDICAL HISTORY  Active Ambulatory Problems     Diagnosis Date Noted   • Osteoarthrosis, localized, primary, shoulder region 02/13/2017   • Hypertension 02/13/2017   • Depressed 02/13/2017   • Status post total replacement of left shoulder 02/13/2017   • Duodenitis 09/27/2021   • Abnormal CT scan, small bowel 09/27/2021   • Nausea and vomiting 09/27/2021   • SIN (obstructive sleep apnea) 09/30/2021   • Snoring 09/30/2021   • Hypersomnia 09/30/2021   • Non-restorative sleep 09/30/2021   • Obesity (BMI 30-39.9) 09/30/2021   • Poor memory 09/30/2021   • Frequent falls 10/22/2021   • Migraine headache 10/26/2021   • Altered mental status  04/08/2022   • Dehydration 04/08/2022   • Encephalopathy, toxic 04/08/2022   • Polypharmacy 04/08/2022   • Chronic prescription opiate use 04/08/2022   • Urinary incontinence 04/08/2022     Resolved Ambulatory Problems     Diagnosis Date Noted   • Vascular dementia with behavior disturbance 10/26/2021     Past Medical History:   Diagnosis Date   • Abdominal pain    • Abnormal CT of the abdomen    • Abnormal glucose    • Abnormal liver enzymes    • Abnormal urinalysis    • Achilles tendinitis    • Acid reflux disease    • Acromioclavicular joint arthritis    • Acromioclavicular joint pain, bilateral    • Aftercare following knee joint replacement surgery    • Alcohol abuse    • Alternating constipation and diarrhea    • Anemia    • Arthralgia of multiple sites    • Arthritis    • BMI 34.0-34.9,adult    • Carpal tunnel syndrome    • Cataract    • Cellulitis of extremity    • Chronic insomnia    • Chronic kidney disease, stage III (moderate) ([facility])    • Contact dermatitis    • Contusion of bone    • Cough    • Decreased range of motion    • Degeneration of cervical intervertebral disc    • Dermatitis    • Diarrhea    • Dry eye    • Dysphagia    • Edema of extremities    • Esophagitis    • Fatigue    • Flu vaccine need    • GERD (gastroesophageal reflux disease)    • Glenohumeral arthritis, left    • Hemorrhoids    • Hiatal hernia    • Hip pain    • History of colonic polyps    • History of DVT in adulthood    • History of transfusion    • Hyperactivity of bladder    • Hyperlipidemia    • Hypokalemia    • Irritable bowel syndrome with diarrhea    • Lactose intolerance    • Left ankle pain    • Leg cramps    • Leg swelling    • Limitation of joint motion of ankle, left    • Lower extremity tendinopathy    • Medicare annual wellness visit, subsequent    • Methicillin resistant Staphylococcus aureus infection    • Microscopic hematuria    • Migraine    • Obstructive sleep apnea    • Osteoarthritis, chronic    • Pain, foot,  left, chronic    • Pancreatitis, chronic (HCC)    • Pes planus    • PONV (postoperative nausea and vomiting)    • Rectal leakage    • Retrocalcaneal bursitis    • Right ankle instability    • Right ankle pain    • Right ankle sprain    • Right ankle swelling    • Rotator cuff tear, left    • S/P shoulder surgery    • Shoulder region pain    • Spinal stenosis    • Swallowing difficulty    • Urge incontinence of urine    • Visit for screening mammogram    • Wears contact lenses    • Wears dentures    • Wears hearing aid    • Weight gain          PAST SURGICAL HISTORY  Past Surgical History:   Procedure Laterality Date   • BACK SURGERY      LUMBAR FUSION    • CERVICAL FUSION      plates, rods, and screws   • CHOLECYSTECTOMY     • COLONOSCOPY      2 YEARS AGO    • ENDOSCOPY N/A 10/14/2021    Procedure: ESOPHAGOGASTRODUODENOSCOPY with biopsy and polypectomy;  Surgeon: Hussain Basilio MD;  Location: [facility] OR;  Service: Gastroenterology;  Laterality: N/A;  gastritis and polyps   • EYE SURGERY      LASER FOR KERITONOSIS    • GALLBLADDER SURGERY     • HERNIA REPAIR     • HYSTERECTOMY     • INGUINAL HERNIA REPAIR     • JOINT REPLACEMENT      BILATERAL KNEES, BILATERAL HIPS   • NECK SURGERY     • TN RECONSTR TOTAL SHOULDER IMPLANT Left 2/13/2017    Procedure: TOTAL SHOULDER REPLACEMENT LEFT;  Surgeon: Benigno Chavez MD;  Location: [facility] OR;  Service: Orthopedics   • REPLACEMENT TOTAL KNEE     • SHOULDER LIGAMENT REPAIR Right    • TONSILLECTOMY     • TOTAL HIP ARTHROPLASTY     • UPPER GASTROINTESTINAL ENDOSCOPY           FAMILY HISTORY  Family History   Problem Relation Age of Onset   • Arthritis Other    • Colonic polyp Other    • Coronary artery disease Other          SOCIAL HISTORY  Social History     Socioeconomic History   • Marital status: Single   Tobacco Use   • Smoking status: Former Smoker   • Smokeless tobacco: Never Used   Vaping Use   • Vaping Use: Never used   Substance and Sexual Activity   •  Alcohol use: Yes     Comment: [employer] SOCIALLY    • Drug use: No   • Sexual activity: Defer         ALLERGIES  Baclofen, Keflex [cephalexin], Melatonin, Sulfa antibiotics, and Tramadol       REVIEW OF SYSTEMS  Review of Systems   Constitutional: Positive for fatigue. Negative for fever.   HENT: Negative.  Negative for sore throat.    Eyes: Negative.    Respiratory: Negative.  Negative for cough.    Cardiovascular: Negative.  Negative for chest pain.   Gastrointestinal: Positive for diarrhea, nausea and vomiting.   Genitourinary: Negative.  Negative for dysuria.   Musculoskeletal: Positive for arthralgias and back pain.   Skin: Negative.  Negative for rash.   Neurological: Negative.  Negative for headaches.   All other systems reviewed and are negative.          PHYSICAL EXAM    I have reviewed the triage vital signs and nursing notes.    ED Triage Vitals [10/03/22 0849]   Temp Heart Rate Resp BP SpO2   98.2 °F (36.8 °C) 81 16 173/99 94 %      Temp src Heart Rate Source Patient Position BP Location FiO2 (%)   Tympanic Monitor -- -- --       Physical Exam  GENERAL: Alert female who is laying on her side in mild distress.  Triage vitals reviewed notable for 173/99  HENT: nares patent  EYES: no scleral icterus  CV: regular rhythm, regular rate  RESPIRATORY: normal effort  ABDOMEN: soft  MUSCULOSKELETAL: no deformity  NEURO: Strength sensation and coordination are grossly intact.  Speech and mentation are unremarkable  SKIN: warm, dry      LAB RESULTS  Recent Results (from the past 24 hour(s))   Comprehensive Metabolic Panel    Collection Time: 10/03/22  9:53 AM    Specimen: Blood   Result Value Ref Range    Glucose 109 (H) 65 - 99 mg/dL    BUN 11 8 - 23 mg/dL    Creatinine 0.94 0.57 - 1.00 mg/dL    Sodium 142 136 - 145 mmol/L    Potassium 3.6 3.5 - 5.2 mmol/L    Chloride 102 98 - 107 mmol/L    CO2 26.6 22.0 - 29.0 mmol/L    Calcium 8.9 8.6 - 10.5 mg/dL    Total Protein 6.8 6.0 - 8.5 g/dL    Albumin 4.20 3.50 -  5.20 g/dL    ALT (SGPT) 9 1 - 33 U/L    AST (SGOT) 18 1 - 32 U/L    Alkaline Phosphatase 83 39 - 117 U/L    Total Bilirubin 0.5 0.0 - 1.2 mg/dL    Globulin 2.6 gm/dL    A/G Ratio 1.6 g/dL    BUN/Creatinine Ratio 11.7 7.0 - 25.0    Anion Gap 13.4 5.0 - 15.0 mmol/L    eGFR 64.2 >60.0 mL/min/1.73   CBC Auto Differential    Collection Time: 10/03/22  9:53 AM    Specimen: Blood   Result Value Ref Range    WBC 5.95 3.40 - 10.80 10*3/mm3    RBC 4.39 3.77 - 5.28 10*6/mm3    Hemoglobin 12.4 12.0 - 15.9 g/dL    Hematocrit 38.8 34.0 - 46.6 %    MCV 88.4 79.0 - 97.0 fL    MCH 28.2 26.6 - 33.0 pg    MCHC 32.0 31.5 - 35.7 g/dL    RDW 15.7 (H) 12.3 - 15.4 %    RDW-SD 51.0 37.0 - 54.0 fl    MPV 9.0 6.0 - 12.0 fL    Platelets 293 140 - 450 10*3/mm3    Neutrophil % 78.2 (H) 42.7 - 76.0 %    Lymphocyte % 14.5 (L) 19.6 - 45.3 %    Monocyte % 5.7 5.0 - 12.0 %    Eosinophil % 0.8 0.3 - 6.2 %    Basophil % 0.5 0.0 - 1.5 %    Immature Grans % 0.3 0.0 - 0.5 %    Neutrophils, Absolute 4.65 1.70 - 7.00 10*3/mm3    Lymphocytes, Absolute 0.86 0.70 - 3.10 10*3/mm3    Monocytes, Absolute 0.34 0.10 - 0.90 10*3/mm3    Eosinophils, Absolute 0.05 0.00 - 0.40 10*3/mm3    Basophils, Absolute 0.03 0.00 - 0.20 10*3/mm3    Immature Grans, Absolute 0.02 0.00 - 0.05 10*3/mm3    nRBC 0.0 0.0 - 0.2 /100 WBC       Ordered the above labs and independently reviewed the results.      RADIOLOGY  XR Chest 1 View    Result Date: 10/3/2022  XR CHEST 1 VW-  HISTORY:  Dizziness, nausea.  COMPARISON:  Chest radiograph 04/18/2022  FINDINGS:  A single view of the chest was obtained. The cardiac silhouette and mediastinal and hilar contours are not significantly changed. There are calcified lymph nodes. There is calcific aortic atherosclerosis. There is a calcified granuloma in the upper right lung. There is no focal consolidation or effusion. There is partially imaged fusion hardware in the lower cervical and upper lumbar spine. There is a left shoulder arthroplasty.   This report was finalized on 10/3/2022 10:04 AM by Dr. Mary Ledesma M.D.        I ordered the above noted radiological studies. Reviewed by me and discussed with radiologist.  See dictation for official radiology interpretation.      PROCEDURES  Procedures      MEDICATIONS GIVEN IN ER    Medications   acetaminophen (TYLENOL) tablet 1,000 mg (has no administration in time range)   sodium chloride 0.9 % bolus 1,000 mL (1,000 mL Intravenous New Bag 10/3/22 0954)   droperidol (INAPSINE) injection 2.5 mg (2.5 mg Intravenous Given 10/3/22 0956)   morphine injection 2 mg (2 mg Intravenous Given 10/3/22 0958)   diphenhydrAMINE (BENADRYL) injection 50 mg (50 mg Intravenous Given 10/3/22 1101)         PROGRESS, DATA ANALYSIS, CONSULTS, AND MEDICAL DECISION MAKING    All labs have been independently reviewed by me.  All radiology studies have been reviewed by me and discussed with radiologist dictating the report.   EKG's independently viewed and interpreted by me.  Discussion below represents my analysis of pertinent findings related to patient's condition, differential diagnosis, treatment plan and final disposition.      ED Course as of 10/03/22 1223   Mon Oct 03, 2022   1039 Chest x-ray discussed with Dr. Ledesma shows no active acute disease [DB]   1039 Labs are reviewed and are pretty benign with normal white blood cell count and hemoglobin.  Chemistries are benign. [DB]   1042 Patient is feeling better after IV antiemetics and IV fluid.  She is having a bit of the dystonic reaction with a feeling of motor restlessness.  We will give some IV Benadryl.  Abdominal exam is benign without significant tenderness.  I did discuss doing a CT scan of the abdomen for further assessment but patient does not want any imaging or CT.  I think the pretest probability of significant pathology is low and therefore I am okay with holding off on CT scan for now.  We will reassess after little bit more fluids and some IV Benadryl [DB]    1221 On repeat evaluation patient is feeling much better after Tylenol and Benadryl.  She is anxious to go home.  Abdominal exam remains benign. [DB]      ED Course User Index  [DB] Gaston Montez MD       AS OF 12:23 EDT VITALS:    BP - 161/90  HR - 84  TEMP - 98.2 °F (36.8 °C) (Tympanic)  O2 SATS - 92%      DIAGNOSIS  Final diagnoses:   Nausea and vomiting, unspecified vomiting type         DISPOSITION  DISCHARGE    Patient discharged in stable condition.    Reviewed implications of results, diagnosis, meds, responsibility to follow up, warning signs and symptoms of possible worsening, potential complications and reasons to return to ER, including increased pain, fever or as needed.    Patient/Family voiced understanding of above instructions.    Discussed plan for discharge, as there is no emergent indication for admission. Patient referred to primary care provider for BP management due to today's BP. Pt/family is agreeable and understands need for follow up and repeat testing.  Pt is aware that discharge does not mean that nothing is wrong but it indicates no emergency is present that requires admission and they must continue care with follow-up as given below or physician of their choice.     FOLLOW-UP  Annie Romero MD  [address] [id number] [phone]    In 1 week  If Not Better         Where to Get Your Medications      These medications were sent to [facility]/pharmacy #[id number] - [place], [place] - [address] [address]. AT [facility] - [phone]  - [phone]   [address] [address], [facility] [address]    Phone: [phone]   · ondansetron 8 MG tablet        Medication List      No changes were made to your prescriptions during this visit.                Gaston Montez MD  10/03/22 1222

## 2022-10-06 ENCOUNTER — APPOINTMENT (OUTPATIENT)
Dept: CT IMAGING | Facility: HOSPITAL | Age: 72
End: 2022-10-06

## 2022-10-06 ENCOUNTER — HOSPITAL ENCOUNTER (OUTPATIENT)
Facility: HOSPITAL | Age: 72
Discharge: HOME | End: 2022-10-09
Attending: EMERGENCY MEDICINE | Admitting: INTERNAL MEDICINE

## 2022-10-06 DIAGNOSIS — I10 HYPERTENSION, UNSPECIFIED TYPE: ICD-10-CM

## 2022-10-06 DIAGNOSIS — R11.2 NAUSEA AND VOMITING, UNSPECIFIED VOMITING TYPE: ICD-10-CM

## 2022-10-06 DIAGNOSIS — R19.7 DIARRHEA, UNSPECIFIED TYPE: ICD-10-CM

## 2022-10-06 DIAGNOSIS — R11.2 NAUSEA AND VOMITING: ICD-10-CM

## 2022-10-06 DIAGNOSIS — R10.9 ABDOMINAL PAIN: ICD-10-CM

## 2022-10-06 DIAGNOSIS — K52.9 COLITIS: Primary | ICD-10-CM

## 2022-10-06 LAB
ALBUMIN SERPL-MCNC: 4.4 G/DL (ref 3.5–5.2)
ALBUMIN/GLOB SERPL: 1.5 G/DL
ALP SERPL-CCNC: 79 U/L (ref 39–117)
ALT SERPL W P-5'-P-CCNC: 12 U/L (ref 1–33)
ANION GAP SERPL CALCULATED.3IONS-SCNC: 13.1 MMOL/L (ref 5–15)
AST SERPL-CCNC: 18 U/L (ref 1–32)
BACTERIA UR QL AUTO: ABNORMAL /HPF
BASOPHILS # BLD AUTO: 0.04 10*3/MM3 (ref 0–0.2)
BASOPHILS NFR BLD AUTO: 0.6 % (ref 0–1.5)
BILIRUB SERPL-MCNC: 0.6 MG/DL (ref 0–1.2)
BILIRUB UR QL STRIP: NEGATIVE
BUN SERPL-MCNC: 13 MG/DL (ref 8–23)
BUN/CREAT SERPL: 14.4 (ref 7–25)
CALCIUM SPEC-SCNC: 9.2 MG/DL (ref 8.6–10.5)
CHLORIDE SERPL-SCNC: 98 MMOL/L (ref 98–107)
CLARITY UR: CLEAR
CO2 SERPL-SCNC: 29.9 MMOL/L (ref 22–29)
COLOR UR: YELLOW
CREAT SERPL-MCNC: 0.9 MG/DL (ref 0.57–1)
D-LACTATE SERPL-SCNC: 1.1 MMOL/L (ref 0.5–2)
DEPRECATED RDW RBC AUTO: 47.9 FL (ref 37–54)
EGFRCR SERPLBLD CKD-EPI 2021: 67.6 ML/MIN/1.73
EOSINOPHIL # BLD AUTO: 0.05 10*3/MM3 (ref 0–0.4)
EOSINOPHIL NFR BLD AUTO: 0.7 % (ref 0.3–6.2)
ERYTHROCYTE [DISTWIDTH] IN BLOOD BY AUTOMATED COUNT: 15.5 % (ref 12.3–15.4)
GLOBULIN UR ELPH-MCNC: 2.9 GM/DL
GLUCOSE SERPL-MCNC: 106 MG/DL (ref 65–99)
GLUCOSE UR STRIP-MCNC: NEGATIVE MG/DL
HCT VFR BLD AUTO: 39.8 % (ref 34–46.6)
HGB BLD-MCNC: 13.7 G/DL (ref 12–15.9)
HGB UR QL STRIP.AUTO: ABNORMAL
HOLD SPECIMEN: NORMAL
HOLD SPECIMEN: NORMAL
HYALINE CASTS UR QL AUTO: ABNORMAL /LPF
IMM GRANULOCYTES # BLD AUTO: 0.02 10*3/MM3 (ref 0–0.05)
IMM GRANULOCYTES NFR BLD AUTO: 0.3 % (ref 0–0.5)
KETONES UR QL STRIP: ABNORMAL
LEUKOCYTE ESTERASE UR QL STRIP.AUTO: NEGATIVE
LIPASE SERPL-CCNC: 36 U/L (ref 13–60)
LYMPHOCYTES # BLD AUTO: 1.7 10*3/MM3 (ref 0.7–3.1)
LYMPHOCYTES NFR BLD AUTO: 24.2 % (ref 19.6–45.3)
MCH RBC QN AUTO: 29.6 PG (ref 26.6–33)
MCHC RBC AUTO-ENTMCNC: 34.4 G/DL (ref 31.5–35.7)
MCV RBC AUTO: 86 FL (ref 79–97)
MONOCYTES # BLD AUTO: 0.65 10*3/MM3 (ref 0.1–0.9)
MONOCYTES NFR BLD AUTO: 9.3 % (ref 5–12)
NEUTROPHILS NFR BLD AUTO: 4.56 10*3/MM3 (ref 1.7–7)
NEUTROPHILS NFR BLD AUTO: 64.9 % (ref 42.7–76)
NITRITE UR QL STRIP: NEGATIVE
NRBC BLD AUTO-RTO: 0 /100 WBC (ref 0–0.2)
PH UR STRIP.AUTO: 6 [PH] (ref 5–8)
PLATELET # BLD AUTO: 339 10*3/MM3 (ref 140–450)
PMV BLD AUTO: 8.8 FL (ref 6–12)
POTASSIUM SERPL-SCNC: 3.2 MMOL/L (ref 3.5–5.2)
PROCALCITONIN SERPL-MCNC: 0.04 NG/ML (ref 0–0.25)
PROT SERPL-MCNC: 7.3 G/DL (ref 6–8.5)
PROT UR QL STRIP: ABNORMAL
RBC # BLD AUTO: 4.63 10*6/MM3 (ref 3.77–5.28)
RBC # UR STRIP: ABNORMAL /HPF
REF LAB TEST METHOD: ABNORMAL
SARS-COV-2 ORF1AB RESP QL NAA+PROBE: NOT DETECTED
SODIUM SERPL-SCNC: 141 MMOL/L (ref 136–145)
SP GR UR STRIP: >=1.03 (ref 1–1.03)
SQUAMOUS #/AREA URNS HPF: ABNORMAL /HPF
UROBILINOGEN UR QL STRIP: ABNORMAL
WBC # UR STRIP: ABNORMAL /HPF
WBC NRBC COR # BLD: 7.02 10*3/MM3 (ref 3.4–10.8)
WHOLE BLOOD HOLD COAG: NORMAL
WHOLE BLOOD HOLD SPECIMEN: NORMAL

## 2022-10-06 PROCEDURE — G0378 HOSPITAL OBSERVATION PER HR: HCPCS

## 2022-10-06 PROCEDURE — 85025 COMPLETE CBC W/AUTO DIFF WBC: CPT

## 2022-10-06 PROCEDURE — 96375 TX/PRO/DX INJ NEW DRUG ADDON: CPT

## 2022-10-06 PROCEDURE — 25010000002 PIPERACILLIN SOD-TAZOBACTAM PER 1 G: Performed by: NURSE PRACTITIONER

## 2022-10-06 PROCEDURE — 83605 ASSAY OF LACTIC ACID: CPT | Performed by: EMERGENCY MEDICINE

## 2022-10-06 PROCEDURE — 25010000002 ONDANSETRON PER 1 MG: Performed by: NURSE PRACTITIONER

## 2022-10-06 PROCEDURE — 84145 PROCALCITONIN (PCT): CPT | Performed by: NURSE PRACTITIONER

## 2022-10-06 PROCEDURE — C9803 HOPD COVID-19 SPEC COLLECT: HCPCS

## 2022-10-06 PROCEDURE — 36415 COLL VENOUS BLD VENIPUNCTURE: CPT

## 2022-10-06 PROCEDURE — U0004 COV-19 TEST NON-CDC HGH THRU: HCPCS | Performed by: EMERGENCY MEDICINE

## 2022-10-06 PROCEDURE — 80053 COMPREHEN METABOLIC PANEL: CPT | Performed by: EMERGENCY MEDICINE

## 2022-10-06 PROCEDURE — 74177 CT ABD & PELVIS W/CONTRAST: CPT

## 2022-10-06 PROCEDURE — 83690 ASSAY OF LIPASE: CPT | Performed by: EMERGENCY MEDICINE

## 2022-10-06 PROCEDURE — 96376 TX/PRO/DX INJ SAME DRUG ADON: CPT

## 2022-10-06 PROCEDURE — 96365 THER/PROPH/DIAG IV INF INIT: CPT

## 2022-10-06 PROCEDURE — 81001 URINALYSIS AUTO W/SCOPE: CPT | Performed by: EMERGENCY MEDICINE

## 2022-10-06 PROCEDURE — 99284 EMERGENCY DEPT VISIT MOD MDM: CPT

## 2022-10-06 PROCEDURE — 25010000002 IOPAMIDOL 61 % SOLUTION: Performed by: EMERGENCY MEDICINE

## 2022-10-06 RX ORDER — SODIUM CHLORIDE 0.9 % (FLUSH) 0.9 %
10 SYRINGE (ML) INJECTION AS NEEDED
Status: DISCONTINUED | OUTPATIENT
Start: 2022-10-06 | End: 2022-10-09 | Stop reason: HOSPADM

## 2022-10-06 RX ORDER — DICYCLOMINE HYDROCHLORIDE 10 MG/1
20 CAPSULE ORAL 4 TIMES DAILY PRN
Status: DISCONTINUED | OUTPATIENT
Start: 2022-10-06 | End: 2022-10-06

## 2022-10-06 RX ORDER — PANTOPRAZOLE SODIUM 40 MG/1
40 TABLET, DELAYED RELEASE ORAL EVERY MORNING
Status: DISCONTINUED | OUTPATIENT
Start: 2022-10-07 | End: 2022-10-09

## 2022-10-06 RX ORDER — ONDANSETRON 2 MG/ML
4 INJECTION INTRAMUSCULAR; INTRAVENOUS EVERY 6 HOURS PRN
Status: DISCONTINUED | OUTPATIENT
Start: 2022-10-06 | End: 2022-10-09 | Stop reason: HOSPADM

## 2022-10-06 RX ORDER — HYDROCODONE BITARTRATE AND ACETAMINOPHEN 5; 325 MG/1; MG/1
1 TABLET ORAL EVERY 4 HOURS PRN
Status: DISCONTINUED | OUTPATIENT
Start: 2022-10-06 | End: 2022-10-09 | Stop reason: HOSPADM

## 2022-10-06 RX ORDER — OXYBUTYNIN CHLORIDE 10 MG/1
10 TABLET, EXTENDED RELEASE ORAL DAILY
Status: DISCONTINUED | OUTPATIENT
Start: 2022-10-06 | End: 2022-10-09 | Stop reason: HOSPADM

## 2022-10-06 RX ORDER — ONDANSETRON 4 MG/1
4 TABLET, FILM COATED ORAL EVERY 6 HOURS PRN
Status: DISCONTINUED | OUTPATIENT
Start: 2022-10-06 | End: 2022-10-09 | Stop reason: HOSPADM

## 2022-10-06 RX ORDER — ONDANSETRON 4 MG/1
8 TABLET, FILM COATED ORAL EVERY 8 HOURS PRN
Status: DISCONTINUED | OUTPATIENT
Start: 2022-10-06 | End: 2022-10-09 | Stop reason: HOSPADM

## 2022-10-06 RX ORDER — POTASSIUM CHLORIDE 750 MG/1
40 TABLET, FILM COATED, EXTENDED RELEASE ORAL AS NEEDED
Status: DISCONTINUED | OUTPATIENT
Start: 2022-10-06 | End: 2022-10-07 | Stop reason: SDUPTHER

## 2022-10-06 RX ORDER — NITROGLYCERIN 0.4 MG/1
0.4 TABLET SUBLINGUAL
Status: DISCONTINUED | OUTPATIENT
Start: 2022-10-06 | End: 2022-10-09 | Stop reason: HOSPADM

## 2022-10-06 RX ORDER — SODIUM CHLORIDE 0.9 % (FLUSH) 0.9 %
10 SYRINGE (ML) INJECTION EVERY 12 HOURS SCHEDULED
Status: DISCONTINUED | OUTPATIENT
Start: 2022-10-06 | End: 2022-10-09 | Stop reason: HOSPADM

## 2022-10-06 RX ORDER — LISINOPRIL 20 MG/1
20 TABLET ORAL 2 TIMES DAILY
Status: DISCONTINUED | OUTPATIENT
Start: 2022-10-06 | End: 2022-10-09 | Stop reason: HOSPADM

## 2022-10-06 RX ORDER — VILAZODONE HYDROCHLORIDE 40 MG/1
40 TABLET ORAL DAILY
Status: DISCONTINUED | OUTPATIENT
Start: 2022-10-07 | End: 2022-10-09 | Stop reason: HOSPADM

## 2022-10-06 RX ORDER — OLANZAPINE 2.5 MG/1
2.5 TABLET ORAL NIGHTLY
Status: DISCONTINUED | OUTPATIENT
Start: 2022-10-06 | End: 2022-10-09 | Stop reason: HOSPADM

## 2022-10-06 RX ORDER — PREGABALIN 100 MG/1
300 CAPSULE ORAL EVERY EVENING
Status: DISCONTINUED | OUTPATIENT
Start: 2022-10-06 | End: 2022-10-09 | Stop reason: HOSPADM

## 2022-10-06 RX ORDER — PREGABALIN 75 MG/1
150 CAPSULE ORAL EVERY MORNING
Status: DISCONTINUED | OUTPATIENT
Start: 2022-10-07 | End: 2022-10-09 | Stop reason: HOSPADM

## 2022-10-06 RX ORDER — POTASSIUM CHLORIDE 1.5 G/1.77G
40 POWDER, FOR SOLUTION ORAL AS NEEDED
Status: DISCONTINUED | OUTPATIENT
Start: 2022-10-06 | End: 2022-10-07 | Stop reason: SDUPTHER

## 2022-10-06 RX ORDER — CETIRIZINE HYDROCHLORIDE 10 MG/1
10 TABLET ORAL DAILY PRN
Status: DISCONTINUED | OUTPATIENT
Start: 2022-10-06 | End: 2022-10-09 | Stop reason: HOSPADM

## 2022-10-06 RX ORDER — ONDANSETRON 2 MG/ML
4 INJECTION INTRAMUSCULAR; INTRAVENOUS ONCE
Status: COMPLETED | OUTPATIENT
Start: 2022-10-06 | End: 2022-10-06

## 2022-10-06 RX ORDER — DICYCLOMINE HYDROCHLORIDE 10 MG/ML
20 INJECTION INTRAMUSCULAR 4 TIMES DAILY PRN
Status: DISCONTINUED | OUTPATIENT
Start: 2022-10-06 | End: 2022-10-09 | Stop reason: HOSPADM

## 2022-10-06 RX ADMIN — IOPAMIDOL 100 ML: 612 INJECTION, SOLUTION INTRAVENOUS at 16:13

## 2022-10-06 RX ADMIN — ONDANSETRON 4 MG: 2 INJECTION INTRAMUSCULAR; INTRAVENOUS at 20:26

## 2022-10-06 RX ADMIN — TAZOBACTAM SODIUM AND PIPERACILLIN SODIUM 3.38 G: 375; 3 INJECTION, SOLUTION INTRAVENOUS at 17:54

## 2022-10-06 RX ADMIN — SODIUM CHLORIDE 1000 ML: 9 INJECTION, SOLUTION INTRAVENOUS at 15:21

## 2022-10-06 RX ADMIN — ONDANSETRON 4 MG: 2 INJECTION INTRAMUSCULAR; INTRAVENOUS at 15:21

## 2022-10-06 NOTE — PROGRESS NOTES
Clinical Pharmacy Services: Medication History    Jaylyn Fuentes is a 72 y.o. female presenting to [facility] for   Chief Complaint   Patient presents with   • Abdominal Pain   • Vomiting   • Nausea   • Diarrhea       She  has a past medical history of Abdominal pain, Abnormal CT of the abdomen, Abnormal glucose, Abnormal liver enzymes, Abnormal urinalysis, Achilles tendinitis, Acid reflux disease, Acromioclavicular joint arthritis, Acromioclavicular joint pain, bilateral, Aftercare following knee joint replacement surgery, Alcohol abuse, Alternating constipation and diarrhea, Anemia, Arthralgia of multiple sites, Arthritis, BMI 34.0-34.9,adult, Carpal tunnel syndrome, Cataract, Cellulitis of extremity, Chronic insomnia, Chronic kidney disease, stage III (moderate) ([facility]), Contact dermatitis, Contusion of bone, Cough, Decreased range of motion, Degeneration of cervical intervertebral disc, Depressed, Dermatitis, Diarrhea, Dry eye, Dysphagia, Edema of extremities, Esophagitis, Fatigue, Flu vaccine need, GERD (gastroesophageal reflux disease), Glenohumeral arthritis, left, Hemorrhoids, Hiatal hernia, Hip pain, History of colonic polyps, History of DVT in adulthood, History of transfusion, Hyperactivity of bladder, Hyperlipidemia, Hypertension, Hypokalemia, Irritable bowel syndrome with diarrhea, Lactose intolerance, Left ankle pain, Leg cramps, Leg swelling, Limitation of joint motion of ankle, left, Lower extremity tendinopathy, Medicare annual wellness visit, subsequent, Methicillin resistant Staphylococcus aureus infection, Microscopic hematuria, Migraine, Obstructive sleep apnea, Osteoarthritis, chronic, Pain, foot, left, chronic, Pancreatitis, chronic (HCC), Pes planus, PONV (postoperative nausea and vomiting), Rectal leakage, Retrocalcaneal bursitis, Right ankle instability, Right ankle pain, Right ankle sprain, Right ankle swelling, Rotator cuff tear, left, S/P shoulder surgery, Shoulder  region pain, Spinal stenosis, Swallowing difficulty, Urge incontinence of urine, Urinary incontinence, Visit for screening mammogram, Wears contact lenses, Wears dentures, Wears hearing aid, and Weight gain.    Allergies as of 10/06/2022 - Reviewed 10/06/2022   Allergen Reaction Noted   • Baclofen Other (See Comments) 06/06/2020   • Keflex [cephalexin] GI Intolerance 02/12/2017   • Melatonin Confusion 12/03/2021   • Sulfa antibiotics Rash 02/12/2017   • Tramadol Anxiety, Other (See Comments), and Confusion 04/10/2022       Medication information was obtained from: Patient  Pharmacy and Phone Number:     Prior to Admission Medications     Prescriptions Last Dose Informant Patient Reported? Taking?    cetirizine (zyrTEC) 10 MG tablet  Self Yes Yes    Take 10 mg by mouth Daily As Needed.    dexlansoprazole (Dexilant) 60 MG capsule  Self Yes Yes    Take 1 capsule by mouth Daily.    diphenoxylate-atropine (LOMOTIL) 2.5-0.025 MG per tablet  Self Yes Yes    Take 1 tablet by mouth 4 (Four) Times a Day As Needed for diarrhea.    fesoterodine fumarate (TOVIAZ ER) 8 MG tablet sustained-release 24 hour capsule  Self Yes Yes    Take 8 mg by mouth Daily.    HYDROcodone-acetaminophen (NORCO) 5-325 MG per tablet  Self No Yes    Take 1 tablet by mouth Every 4 (Four) Hours As Needed for Moderate Pain .    lisinopril (PRINIVIL,ZESTRIL) 20 MG tablet  Self Yes Yes    Take 20 mg by mouth 2 (Two) Times a Day.    OLANZapine (zyPREXA) 2.5 MG tablet  Self Yes Yes    Take 2.5 mg by mouth Every Night.    ondansetron (ZOFRAN) 8 MG tablet  Self No Yes    Take 1 tablet by mouth Every 8 (Eight) Hours As Needed for Vomiting.    pregabalin (LYRICA) 150 MG capsule  Self Yes Yes    Take 300 mg by mouth Every Evening.    pregabalin (LYRICA) 150 MG capsule  Self No Yes    Take 1 capsule by mouth Every Morning.    Riboflavin 100 MG tablet  Self Yes Yes    Take 100 mg by mouth Daily.    vilazodone (VIIBRYD) 40 MG tablet tablet  Self Yes Yes    Take 40 mg  by mouth Daily.            Medication notes:     This medication list is complete to the best of my knowledge as of 10/6/2022    Please call if questions.    Justin De La Vega  Medication History Technician   [phone]    10/6/2022 18:37 EDT

## 2022-10-06 NOTE — PROGRESS NOTES
MD ATTESTATION NOTE    The ASHISH and I have discussed this patient's history, physical exam, and treatment plan.  I have reviewed the documentation and personally had a face to face interaction with the patient. I affirm the documentation and agree with the treatment and plan.  The attached note describes my personal findings.      I provided a substantive portion of the care of the patient.  I personally performed the physical exam in its entirety, and below are my findings.  For this patient encounter, the patient wore surgical mask, I wore full protective PPE including N95 and eye protection  History is obtained from the patient as well as the patient's partner in bed 33 in the emergency department.  Brief HPI: This is a 72-year-old female history history of depression, high blood pressure migraines, and previous history of cognitive impairment in April 2022 with chronic memory problem.  Patient symptoms started about 6 days ago with some crampy abdominal pain and nausea.  She did have some diarrhea as well that started at that same time.  She had about 1-2 episodes of diarrhea a day.  The pain was worse after eating or drinking.  It is described as a crampy pain and more in the lower portion of her abdomen, specifically in the left lower quadrant.  She was seen in the emergency department on October 3 and had lab work.  She has had worsening of her symptoms and worsening of the diarrhea and return to the emergency department today.  Denies any blood in the diarrhea or black diarrhea.  She is now having 2-3 episodes and possibly 4 episodes of diarrhea a day.  She is not been on any recent antibiotics and unaware of any rotten food.  No raw food.  And no travel exposure.  Her temperature max prior to arrival here according to her partner was 99.5.  No definitive fever noted.  She has had a history of GI problems with diarrhea and pain in the past.  She sees a GI doctor at [facility] in the past.  Never has a formal  diagnosis of her condition.  She denies any chest pain or shortness of breath.  She has been eating very little and is been drinking liquids but not drinking many liquids.  Currently patient denies any abdominal pain.  If you do press on her belly it makes her have a sensation she needs to have a bowel movement or urinate.  General : Elderly female patient is awake alert and oriented.  Patient is a little forgetful but is at baseline.  Hemodynamically stable patient does not appear to toxic or septic.  HEENT: NCAT  CV: Heart is regular with no murmurs  Respiratory: CTA bilaterally  Abd: Soft and obese.  Normal bowel sounds.  Patient has some mild reproducible pain in the left lower quadrant.  There is no guarding or rebound.  Ext: No acute abnormalities.  Intact distal pulses to upper and lower extremities bilaterally.  No cyanosis, coolness or pallor  Skin: No rash  Neuro: Cranial nerves II through XII grossly intact as tested.  No acute lateralizing deficits.  Psych: Normal mood and affect    I have reviewed the patient's vital signs, lab work, EKG and imaging.    Plan:  1.  Abdominal pain: CT scan of the abdomen pelvis suggest mild transverse colitis.  There is no obvious fluid collection or abscess or free air.  Patient's lab work is unremarkable other than a mildly low potassium 3.2.  We will continue with some gentle IV hydration and IV Zosyn.  Patient's lactic acid is normal and she is afebrile here.  GI has been consulted.  We will continue to treat nausea and pain accordingly.  2.  Hypokalemia potassium 3.2 on initial draw we will continue to trend potassium and replace as needed.    Lab work just from October 3 visit essentially shows no acute change from today's visit.  I talked with the patient as well as significant other informed of the treatment plan and all questions answered at this time.        Note Disclaimer: At [facility], we believe that sharing information builds trust and better  relationships. You are receiving this note because you recently visited [facility]. It is possible you will see health information before a provider has talked with you about it. This kind of information can be easy to misunderstand. To help you fully understand what it means for your health, we urge you to discuss this note with your provider.

## 2022-10-06 NOTE — ED PROVIDER NOTES
EMERGENCY DEPARTMENT ENCOUNTER    Room Number:  119/1  Date of encounter:  10/6/2022  PCP: Annie Romero MD  Historian: Patient and partner      PPE    Patient was placed in face mask in first look. Patient was wearing facemask when I entered the room and throughout our encounter. I wore full protective equipment throughout this patient encounter including a face mask, and gloves. Hand hygiene was performed before donning protective equipment and after removal when leaving the room.        HPI:  Chief Complaint: Abdominal pain  A complete HPI/ROS/PMH/PSH/SH/FH are unobtainable due to: Nothing    Context: Jaylyn Fuentes is a 72 y.o. female with a history of hypertension, depression, chronic pancreatitis who arrives to the ED via private vehicle from home.  Patient presents with c/o mild, constant, crampy left-sided abdominal pain that began Saturday.   Patient also complains of nausea since Saturday, diarrhea that started yesterday.  Patient states her symptoms started after they went out to eat on Friday night.  She was seen here Monday and was sent home with Zofran which she states is not providing her any relief.  Patient states she is also had a cough since Saturday.  Patient denies fever, chills, vomiting, dysuria, chest pain, shortness of breath.  Patient states that nothing makes the symptoms better and p.o. intake worsens symptoms.          PAST MEDICAL HISTORY  Active Ambulatory Problems     Diagnosis Date Noted   • Osteoarthrosis, localized, primary, shoulder region 02/13/2017   • Hypertension 02/13/2017   • Depressed 02/13/2017   • Status post total replacement of left shoulder 02/13/2017   • Duodenitis 09/27/2021   • Abnormal CT scan, small bowel 09/27/2021   • Nausea and vomiting 09/27/2021   • SIN (obstructive sleep apnea) 09/30/2021   • Snoring 09/30/2021   • Hypersomnia 09/30/2021   • Non-restorative sleep 09/30/2021   • Obesity (BMI 30-39.9) 09/30/2021   • Poor memory 09/30/2021   •  Frequent falls 10/22/2021   • Migraine headache 10/26/2021   • Altered mental status 04/08/2022   • Dehydration 04/08/2022   • Encephalopathy, toxic 04/08/2022   • Polypharmacy 04/08/2022   • Chronic prescription opiate use 04/08/2022   • Urinary incontinence 04/08/2022     Resolved Ambulatory Problems     Diagnosis Date Noted   • Vascular dementia with behavior disturbance 10/26/2021     Past Medical History:   Diagnosis Date   • Abdominal pain    • Abnormal CT of the abdomen    • Abnormal glucose    • Abnormal liver enzymes    • Abnormal urinalysis    • Achilles tendinitis    • Acid reflux disease    • Acromioclavicular joint arthritis    • Acromioclavicular joint pain, bilateral    • Aftercare following knee joint replacement surgery    • Alcohol abuse    • Alternating constipation and diarrhea    • Anemia    • Arthralgia of multiple sites    • Arthritis    • BMI 34.0-34.9,adult    • Carpal tunnel syndrome    • Cataract    • Cellulitis of extremity    • Chronic insomnia    • Chronic kidney disease, stage III (moderate) ([facility])    • Contact dermatitis    • Contusion of bone    • Cough    • Decreased range of motion    • Degeneration of cervical intervertebral disc    • Dermatitis    • Diarrhea    • Dry eye    • Dysphagia    • Edema of extremities    • Esophagitis    • Fatigue    • Flu vaccine need    • GERD (gastroesophageal reflux disease)    • Glenohumeral arthritis, left    • Hemorrhoids    • Hiatal hernia    • Hip pain    • History of colonic polyps    • History of DVT in adulthood    • History of transfusion    • Hyperactivity of bladder    • Hyperlipidemia    • Hypokalemia    • Irritable bowel syndrome with diarrhea    • Lactose intolerance    • Left ankle pain    • Leg cramps    • Leg swelling    • Limitation of joint motion of ankle, left    • Lower extremity tendinopathy    • Medicare annual wellness visit, subsequent    • Methicillin resistant Staphylococcus aureus infection    • Microscopic hematuria    •  Migraine    • Obstructive sleep apnea    • Osteoarthritis, chronic    • Pain, foot, left, chronic    • Pancreatitis, chronic (HCC)    • Pes planus    • PONV (postoperative nausea and vomiting)    • Rectal leakage    • Retrocalcaneal bursitis    • Right ankle instability    • Right ankle pain    • Right ankle sprain    • Right ankle swelling    • Rotator cuff tear, left    • S/P shoulder surgery    • Shoulder region pain    • Spinal stenosis    • Swallowing difficulty    • Urge incontinence of urine    • Visit for screening mammogram    • Wears contact lenses    • Wears dentures    • Wears hearing aid    • Weight gain          PAST SURGICAL HISTORY  Past Surgical History:   Procedure Laterality Date   • BACK SURGERY      LUMBAR FUSION    • CERVICAL FUSION      plates, rods, and screws   • CHOLECYSTECTOMY     • COLONOSCOPY      2 YEARS AGO    • ENDOSCOPY N/A 10/14/2021    Procedure: ESOPHAGOGASTRODUODENOSCOPY with biopsy and polypectomy;  Surgeon: Hussain Basilio MD;  Location: [facility] OR;  Service: Gastroenterology;  Laterality: N/A;  gastritis and polyps   • EYE SURGERY      LASER FOR KERITONOSIS    • GALLBLADDER SURGERY     • HERNIA REPAIR     • HYSTERECTOMY     • INGUINAL HERNIA REPAIR     • JOINT REPLACEMENT      BILATERAL KNEES, BILATERAL HIPS   • NECK SURGERY     • MA RECONSTR TOTAL SHOULDER IMPLANT Left 2/13/2017    Procedure: TOTAL SHOULDER REPLACEMENT LEFT;  Surgeon: Benigno Chavez MD;  Location: [facility] OR;  Service: Orthopedics   • REPLACEMENT TOTAL KNEE     • SHOULDER LIGAMENT REPAIR Right    • TONSILLECTOMY     • TOTAL HIP ARTHROPLASTY     • UPPER GASTROINTESTINAL ENDOSCOPY           FAMILY HISTORY  Family History   Problem Relation Age of Onset   • Arthritis Other    • Colonic polyp Other    • Coronary artery disease Other          SOCIAL HISTORY  Social History     Socioeconomic History   • Marital status: Single   Tobacco Use   • Smoking status: Former Smoker   • Smokeless tobacco: Never  Used   Vaping Use   • Vaping Use: Never used   Substance and Sexual Activity   • Alcohol use: Yes     Comment: [employer] SOCIALLY    • Drug use: No   • Sexual activity: Defer         ALLERGIES  Baclofen, Keflex [cephalexin], Melatonin, Sulfa antibiotics, and Tramadol        REVIEW OF SYSTEMS  Review of Systems     All systems reviewed and negative except for those discussed in HPI.        PHYSICAL EXAM    ED Triage Vitals   Temp Heart Rate Resp BP SpO2   10/06/22 1257 10/06/22 1257 10/06/22 1257 10/06/22 1259 10/06/22 1257   98 °F (36.7 °C) 79 18 (!) 165/103 94 %       Physical Exam  GENERAL: Well appearing, nontoxic appearing, not distressed  HENT: normocephalic, atraumatic  EYES: no scleral icterus, PERRL  CV: regular rhythm, regular rate, no murmur  RESPIRATORY: normal effort, CTAB  ABDOMEN: soft, normal bowel sounds, diffuse left-sided tenderness, no rebound, guarding or rigidity  MUSCULOSKELETAL: no deformity  NEURO: alert, moves all extremities, follows commands, mental status normal/baseline  SKIN: warm, dry, no rash   Psych: Appropriate mood and affect  Nursing notes and vital signs reviewed      LAB RESULTS  Recent Results (from the past 24 hour(s))   Comprehensive Metabolic Panel    Collection Time: 10/06/22  1:14 PM    Specimen: Blood   Result Value Ref Range    Glucose 106 (H) 65 - 99 mg/dL    BUN 13 8 - 23 mg/dL    Creatinine 0.90 0.57 - 1.00 mg/dL    Sodium 141 136 - 145 mmol/L    Potassium 3.2 (L) 3.5 - 5.2 mmol/L    Chloride 98 98 - 107 mmol/L    CO2 29.9 (H) 22.0 - 29.0 mmol/L    Calcium 9.2 8.6 - 10.5 mg/dL    Total Protein 7.3 6.0 - 8.5 g/dL    Albumin 4.40 3.50 - 5.20 g/dL    ALT (SGPT) 12 1 - 33 U/L    AST (SGOT) 18 1 - 32 U/L    Alkaline Phosphatase 79 39 - 117 U/L    Total Bilirubin 0.6 0.0 - 1.2 mg/dL    Globulin 2.9 gm/dL    A/G Ratio 1.5 g/dL    BUN/Creatinine Ratio 14.4 7.0 - 25.0    Anion Gap 13.1 5.0 - 15.0 mmol/L    eGFR 67.6 >60.0 mL/min/1.73   Lipase    Collection Time: 10/06/22   1:14 PM    Specimen: Blood   Result Value Ref Range    Lipase 36 13 - 60 U/L   Lactic Acid, Plasma    Collection Time: 10/06/22  1:14 PM    Specimen: Blood   Result Value Ref Range    Lactate 1.1 0.5 - 2.0 mmol/L   Green Top (Gel)    Collection Time: 10/06/22  1:14 PM   Result Value Ref Range    Extra Tube Hold for add-ons.    Lavender Top    Collection Time: 10/06/22  1:14 PM   Result Value Ref Range    Extra Tube hold for add-on    Gold Top - SST    Collection Time: 10/06/22  1:14 PM   Result Value Ref Range    Extra Tube Hold for add-ons.    Light Blue Top    Collection Time: 10/06/22  1:14 PM   Result Value Ref Range    Extra Tube Hold for add-ons.    CBC Auto Differential    Collection Time: 10/06/22  1:14 PM    Specimen: Blood   Result Value Ref Range    WBC 7.02 3.40 - 10.80 10*3/mm3    RBC 4.63 3.77 - 5.28 10*6/mm3    Hemoglobin 13.7 12.0 - 15.9 g/dL    Hematocrit 39.8 34.0 - 46.6 %    MCV 86.0 79.0 - 97.0 fL    MCH 29.6 26.6 - 33.0 pg    MCHC 34.4 31.5 - 35.7 g/dL    RDW 15.5 (H) 12.3 - 15.4 %    RDW-SD 47.9 37.0 - 54.0 fl    MPV 8.8 6.0 - 12.0 fL    Platelets 339 140 - 450 10*3/mm3    Neutrophil % 64.9 42.7 - 76.0 %    Lymphocyte % 24.2 19.6 - 45.3 %    Monocyte % 9.3 5.0 - 12.0 %    Eosinophil % 0.7 0.3 - 6.2 %    Basophil % 0.6 0.0 - 1.5 %    Immature Grans % 0.3 0.0 - 0.5 %    Neutrophils, Absolute 4.56 1.70 - 7.00 10*3/mm3    Lymphocytes, Absolute 1.70 0.70 - 3.10 10*3/mm3    Monocytes, Absolute 0.65 0.10 - 0.90 10*3/mm3    Eosinophils, Absolute 0.05 0.00 - 0.40 10*3/mm3    Basophils, Absolute 0.04 0.00 - 0.20 10*3/mm3    Immature Grans, Absolute 0.02 0.00 - 0.05 10*3/mm3    nRBC 0.0 0.0 - 0.2 /100 WBC   Urinalysis With Microscopic If Indicated (No Culture) - Urine, Clean Catch    Collection Time: 10/06/22  4:53 PM    Specimen: Urine, Clean Catch   Result Value Ref Range    Color, UA Yellow Yellow, Straw    Appearance, UA Clear Clear    pH, UA 6.0 5.0 - 8.0    Specific Gravity, UA >=1.030 1.005 - 1.030     Glucose, UA Negative Negative    Ketones, UA 15 mg/dL (1+) (A) Negative    Bilirubin, UA Negative Negative    Blood, UA Moderate (2+) (A) Negative    Protein, UA Trace (A) Negative    Leuk Esterase, UA Negative Negative    Nitrite, UA Negative Negative    Urobilinogen, UA 1.0 E.U./dL 0.2 - 1.0 E.U./dL   Urinalysis, Microscopic Only - Urine, Clean Catch    Collection Time: 10/06/22  4:53 PM    Specimen: Urine, Clean Catch   Result Value Ref Range    RBC, UA 13-20 (A) None Seen, 0-2 /HPF    WBC, UA 3-5 (A) None Seen, 0-2 /HPF    Bacteria, UA None Seen None Seen /HPF    Squamous Epithelial Cells, UA 0-2 None Seen, 0-2 /HPF    Hyaline Casts, UA 3-6 None Seen /LPF    Methodology Automated Microscopy        Ordered the above labs and independently reviewed the results.      RADIOLOGY  CT Abdomen Pelvis With Contrast    Result Date: 10/6/2022  CT ABDOMEN PELVIS W CONTRAST-  INDICATIONS: Pain  TECHNIQUE: Radiation dose reduction techniques were utilized, including automated exposure control and exposure modulation based on body size. Enhanced ABDOMEN AND PELVIS CT  COMPARISON: 9/15/2021  FINDINGS:  The gallbladder is surgically absent, with mild intrahepatic and extrahepatic biliary ductal dilatation. Likely focal fat is seen anteriorly at the left hepatic lobe..  No hydronephrosis is noted. Portions of the distal ureters and urinary bladder are obscured by bilateral hip arthroplasty hardware artifact.  Otherwise unremarkable appearance of the liver, spleen, adrenal glands, pancreas, kidneys, bladder.  No bowel obstruction. Appearance of mild wall thickening predominantly the transverse and descending colon is likely at least in part from lack of distention, but could be evidence of mild nonspecific colitis. Minimal hiatal hernia is apparent. Colonic diverticula are seen that do not appear inflamed.  No free intraperitoneal gas or free fluid.  Scattered small mesenteric and para-aortic lymph nodes are seen that are not  significant by size criteria.  Abdominal aorta is not aneurysmal. Aortic and other arterial calcifications are present.  The lung bases show mild atelectasis.  Degenerative and surgical changes are seen in the spine. No acute fracture is identified.          1. Appearance of mild wall thickening predominantly the transverse and descending colon is likely at least in part from lack of distention, but could be evidence of mild nonspecific colitis. Colonic diverticulosis. 2. No obstructive uropathy.  This report was finalized on 10/6/2022 4:43 PM by Dr. Serg Figueroa M.D.        I ordered the above noted radiological studies and viewed the images on the PACS system.       MEDICAL RECORD REVIEW  Medical records reviewed in [facility], patient was seen here October 3 for similar symptoms.  She had unremarkable lab work, received IV fluids and was feeling better during that visit.      PROCEDURES    Procedures        DIFFERENTIAL DIAGNOSIS  Differential diagnosis for abdominal pain include but are not limited to the following:    -Hepatobiliary pathology such as cholecystitis, cholangitis, and symptomatic cholelithiasis  -GERD  -Pancreatitis  -Small or large bowel obstruction  -Appendicitis  -Diverticulitis  -UTI including pyelonephritis  -Ureteral stone  -Zoster  -Colitis, including infectious and ischemic  -Atypical ACS              PROGRESS, DATA ANALYSIS, CONSULTS, AND MEDICAL DECISION MAKING        ED Course as of 10/06/22 1957   u Oct 06, 2022   1328 WBC: 7.02 [DC]   1328 Hemoglobin: 13.7 [DC]   1328 Platelets: 339 [DC]   1453 Patient is a 72-year-old who presents today with diffuse left-sided abdominal pain, nausea and diarrhea.  Lab work so far looks unremarkable normal white count, BUN and creatinine.  We will do CT of the abdomen and pelvis to evaluate for colitis, diverticulitis or other abnormalities.  Patient will also receive IV fluids and antiemetics. [MS]   7541 Reviewed pt's history and workup with   Martin.  After a bedside evaluation, he agrees with the plan of care.     [MS]   1707 Consult Note    Discussed care with SOTERO Madison  Reviewed patient's history, exam, results and need for admission secondary to Colitis, vomiting, diarrhea  Dr. Sousa accepts the patient to be admitted to observation unit     [MS]      ED Course User Index  [DC] Chandan Salazar MD  [MS] Jes Xie, APRN     ADMISSION    Discussed treatment plan and reason for admission with pt/family and admitting physician.  Pt/family voiced understanding of the plan for admission for further testing/treatment as needed.      DIAGNOSIS  Final diagnoses:   Colitis   Nausea and vomiting, unspecified vomiting type   Diarrhea, unspecified type   Hypertension, unspecified type           MEDICATIONS GIVEN IN ED    Medications   sodium chloride 0.9 % flush 10 mL (has no administration in time range)   nitroglycerin (NITROSTAT) SL tablet 0.4 mg (has no administration in time range)   sodium chloride 0.9 % flush 10 mL (10 mL Intravenous Not Given 10/6/22 1948)   sodium chloride 0.9 % flush 10 mL (has no administration in time range)   ondansetron (ZOFRAN) tablet 4 mg (has no administration in time range)     Or   ondansetron (ZOFRAN) injection 4 mg (has no administration in time range)   potassium chloride (K-DUR,KLOR-CON) ER tablet 40 mEq (has no administration in time range)   potassium chloride (KLOR-CON) packet 40 mEq (has no administration in time range)   lisinopril (PRINIVIL,ZESTRIL) tablet 20 mg (has no administration in time range)   HYDROcodone-acetaminophen (NORCO) 5-325 MG per tablet 1 tablet (has no administration in time range)   oxybutynin XL (DITROPAN-XL) 24 hr tablet 10 mg (has no administration in time range)   pantoprazole (PROTONIX) EC tablet 40 mg (has no administration in time range)   cetirizine (zyrTEC) tablet 10 mg (has no administration in time range)   OLANZapine (zyPREXA) tablet 2.5 mg (has no administration  in time range)   ondansetron (ZOFRAN) tablet 8 mg (has no administration in time range)   pregabalin (LYRICA) capsule 300 mg (has no administration in time range)   pregabalin (LYRICA) capsule 150 mg (has no administration in time range)   Vitamin B-2 (RIBOFLAVIN) tablet 100 mg (has no administration in time range)   vilazodone (VIIBRYD) tablet 40 mg (has no administration in time range)   dicyclomine (BENTYL) capsule 20 mg (has no administration in time range)   sodium chloride 0.9 % bolus 1,000 mL (0 mL Intravenous Stopped 10/6/22 1750)   ondansetron (ZOFRAN) injection 4 mg (4 mg Intravenous Given 10/6/22 1521)   iopamidol (ISOVUE-300) 61 % injection 100 mL (100 mL Intravenous Given 10/6/22 1613)   piperacillin-tazobactam (ZOSYN) 3.375 g in iso-osmotic dextrose 50 ml (premix) (3.375 g Intravenous New Bag 10/6/22 1754)           COURSE & MEDICAL DECISION MAKING  Any/All labs and Any/All Imaging studies that were ordered were reviewed and are noted above.  Results were reviewed/discussed with the patient and they were also made aware of online access.    Pt also made aware that some labs, such as cultures, will not be resulted during ER visit and followup with PMD is necessary.        Jes Xie, APRN  10/06/22 1957

## 2022-10-06 NOTE — ED TRIAGE NOTES
Chief Complaint   Patient presents with   • Abdominal Pain   • Vomiting   • Nausea   • Diarrhea     Presents via private vehicle with above complaints x5 days.     Patient was placed in face mask during first look triage.  Patient was wearing a face mask throughout encounter.  I wore personal protective equipment throughout the encounter.  Hand hygiene was performed before and after patient encounter.

## 2022-10-06 NOTE — ED PROVIDER NOTES
Pt presents to the ED c/o  of left-sided abdominal crampy pain that started on Saturday after eating chicken wings at a restaurant.  Seen here Monday sent with Zofran but no relief.  No fevers but has been chilled.  No vomiting but has had nausea.  No diarrhea, no dysuria.  Pain is improved while laying here.     On exam,   General: No acute distress, nontoxic, nondiaphoretic  HEENT: Mucous membranes moist, atraumatic, EOMI  Neck: Full ROM  Pulm: Symmetric chest rise, nonlabored, lungs CTAB  Cardiovascular: Regular rate and rhythm, intact distal pulses  GI: Soft, minimal generalized lower abdominal discomfort to palpation, nondistended, no rebound, no guarding, bowel sounds present  MSK: Full ROM, no deformity  Skin: Warm, dry  Neuro: Awake, alert, oriented x 4, GCS 15, moving all extremities, no focal deficits  Psych: Calm, cooperative      N95, protective eye goggles, and gloves used during this encounter. Patient in surgical mask.      Plan:   ED Course as of 10/07/22 1854   Thu Oct 06, 2022   1328 WBC: 7.02 [DC]   1328 Hemoglobin: 13.7 [DC]   1328 Platelets: 339 [DC]   1453 Patient is a 72-year-old who presents today with diffuse left-sided abdominal pain, nausea and diarrhea.  Lab work so far looks unremarkable normal white count, BUN and creatinine.  We will do CT of the abdomen and pelvis to evaluate for colitis, diverticulitis or other abnormalities.  Patient will also receive IV fluids and antiemetics. [MS]   0213 Reviewed pt's history and workup with Dr. Salazar.  After a bedside evaluation, he agrees with the plan of care.     [MS]   2527 Consult Note    Discussed care with SOTERO Madison  Reviewed patient's history, exam, results and need for admission secondary to Colitis, vomiting, diarrhea  Dr. Sousa accepts the patient to be admitted to observation unit     [MS]      ED Course User Index  [DC] Chandan Salazar MD  [MS] Jes Xie, GRACIE     Lab results are so far reassuring, awaiting CT  scan results with final disposition pending.  Can potentially discharge home if reassuring CT scan.      Update: Patient diagnosed with colitis, plan for observation unit stay.     Attestation:  The ASHISH and I have discussed this patient's history, physical exam, and treatment plan.  I have reviewed the documentation and personally had a face to face interaction with the patient. I affirm the documentation and agree with the treatment and plan.  The attached note describes my personal findings.          Chandan Salazar MD  10/07/22 6033

## 2022-10-06 NOTE — H&P
[facility]   HISTORY AND PHYSICAL    Patient Name: Jaylyn Fuentes  : 1950  MRN: [id number]  Primary Care Physician:  Annie Romero MD  Date of admission: 10/6/2022    Subjective   Subjective     Chief Complaint:   Chief Complaint   Patient presents with   • Abdominal Pain   • Vomiting   • Nausea   • Diarrhea         HPI:    Jaylyn Fuentes is a pleasant afebrile ambulatory 72 y.o.  female with a past medical history of hypertension, stage 3 CKD, and irritable bowel syndrome.     She presents to the Emergency department at [facility] today and has been admitted to the observation unit for abdominal pain.  She was seen in the ER 3 days ago and had normal lab work-up and was discharged home. She returned today with recurrent abdominal pain, nausea, vomiting and diarrhea. She denies any recent antibiotic usage.     She reports a mild low-grade temp of 99.5 at home.    Review of Systems   All systems were reviewed and negative except for: abdominal pain, nausea, vomiting and diarrhea.    Personal History     Past Medical History:   Diagnosis Date   • Abdominal pain    • Abnormal CT of the abdomen    • Abnormal glucose    • Abnormal liver enzymes    • Abnormal urinalysis    • Achilles tendinitis     left   • Acid reflux disease    • Acromioclavicular joint arthritis    • Acromioclavicular joint pain, bilateral    • Aftercare following knee joint replacement surgery    • Alcohol abuse    • Alternating constipation and diarrhea    • Anemia    • Arthralgia of multiple sites    • Arthritis    • BMI 34.0-34.9,adult    • Carpal tunnel syndrome    • Cataract    • Cellulitis of extremity    • Chronic insomnia    • Chronic kidney disease, stage III (moderate) ([facility])    • Contact dermatitis    • Contusion of bone    • Cough    • Decreased range of motion    • Degeneration of cervical intervertebral disc    • Depressed    • Dermatitis    • Diarrhea    • Dry eye    • Dysphagia    •  Edema of extremities    • Esophagitis    • Fatigue    • Flu vaccine need    • GERD (gastroesophageal reflux disease)    • Glenohumeral arthritis, left    • Hemorrhoids    • Hiatal hernia    • Hip pain    • History of colonic polyps    • History of DVT in adulthood    • History of transfusion    • Hyperactivity of bladder    • Hyperlipidemia    • Hypertension    • Hypokalemia    • Irritable bowel syndrome with diarrhea    • Lactose intolerance    • Left ankle pain    • Leg cramps    • Leg swelling    • Limitation of joint motion of ankle, left    • Lower extremity tendinopathy    • Medicare annual wellness visit, subsequent    • Methicillin resistant Staphylococcus aureus infection    • Microscopic hematuria    • Migraine    • Obstructive sleep apnea    • Osteoarthritis, chronic    • Pain, foot, left, chronic    • Pancreatitis, chronic (HCC)    • Pes planus    • PONV (postoperative nausea and vomiting)     SCOPOLAMINE HELPS-USED DURING LAST SURGERY    • Rectal leakage    • Retrocalcaneal bursitis    • Right ankle instability    • Right ankle pain    • Right ankle sprain    • Right ankle swelling    • Rotator cuff tear, left    • S/P shoulder surgery    • Shoulder region pain    • Spinal stenosis    • Swallowing difficulty    • Urge incontinence of urine    • Urinary incontinence    • Visit for screening mammogram    • Wears contact lenses    • Wears dentures     UPPER AND LOWER    • Wears hearing aid     DOESN'T HAVE WITH HER   • Weight gain        Past Surgical History:   Procedure Laterality Date   • BACK SURGERY      LUMBAR FUSION    • CERVICAL FUSION      plates, rods, and screws   • CHOLECYSTECTOMY     • COLONOSCOPY      2 YEARS AGO    • ENDOSCOPY N/A 10/14/2021    Procedure: ESOPHAGOGASTRODUODENOSCOPY with biopsy and polypectomy;  Surgeon: Hussain Basilio MD;  Location: [facility] OR;  Service: Gastroenterology;  Laterality: N/A;  gastritis and polyps   • EYE SURGERY      LASER FOR KERITONOSIS    •  "GALLBLADDER SURGERY     • HERNIA REPAIR     • HYSTERECTOMY     • INGUINAL HERNIA REPAIR     • JOINT REPLACEMENT      BILATERAL KNEES, BILATERAL HIPS   • NECK SURGERY     • [place] RECONSTR TOTAL SHOULDER IMPLANT Left 2/13/2017    Procedure: TOTAL SHOULDER REPLACEMENT LEFT;  Surgeon: Benigno Chavez MD;  Location: [facility];  Service: Orthopedics   • REPLACEMENT TOTAL KNEE     • SHOULDER LIGAMENT REPAIR Right    • TONSILLECTOMY     • TOTAL HIP ARTHROPLASTY     • UPPER GASTROINTESTINAL ENDOSCOPY         Family History: family history includes Arthritis in an other family member; Colonic polyp in an other family member; Coronary artery disease in an other family member. Otherwise pertinent FHx was reviewed and not pertinent to current issue.    Social History:  reports that she has quit smoking. She has never used smokeless tobacco. She reports current alcohol use. She reports that she does not use drugs.    Home Medications:  HYDROcodone-acetaminophen, OLANZapine, SUMAtriptan, ZOLMitriptan, calcium carbonate-cholecalciferol, cetirizine, dexlansoprazole, diphenoxylate-atropine, fesoterodine fumarate, lisinopril, ondansetron, pregabalin, rosuvastatin, tolterodine, and vilazodone    Allergies:  Allergies   Allergen Reactions   • Baclofen Other (See Comments)     \"makes me crazy\"   • Keflex [Cephalexin] GI Intolerance   • Melatonin Confusion   • Sulfa Antibiotics Rash     SKIN BRIGHT RED    • Tramadol Anxiety, Other (See Comments) and Confusion     Per pt's MD       Objective   Objective     Vitals:   Temp:  [98 °F (36.7 °C)] 98 °F (36.7 °C)  Heart Rate:  [68-79] 75  Resp:  [18] 18  BP: (147-185)/() 156/87  Physical Exam    Constitutional: Awake, alert   Eyes: PERRLA, sclerae anicteric, no conjunctival injection   HENT: NCAT, mucous membranes moist   Neck: Supple, no thyromegaly, no lymphadenopathy, trachea midline   Respiratory: Clear to auscultation bilaterally, nonlabored respirations    Cardiovascular: RRR, no " murmurs, rubs, or gallops, palpable pedal pulses bilaterally   Gastrointestinal: Positive bowel sounds, soft, mild diffuse lower abdominal discomfort to palpation without rebound or guarding, nondistended   Musculoskeletal: No bilateral ankle edema, no clubbing or cyanosis to extremities   Psychiatric: Appropriate affect, cooperative   Neurologic: Oriented x 3, strength symmetric in all extremities, Cranial Nerves grossly intact to confrontation, speech clear   Skin: No rashes     Result Review    Result Review:  I have personally reviewed the results from the time of this admission to 10/6/2022 18:26 EDT and agree with these findings:  [x]  Laboratory list / accordion  []  Microbiology  [x]  Radiology  []  EKG/Telemetry   []  Cardiology/Vascular   []  Pathology  []  Old records  []  Other:  Most notable findings include: ct abdomen/pelvis shows mild colitis changes, potassium 3.2,       Assessment & Plan   Assessment / Plan     Brief Patient Summary:  Jaylyn Fuentes is a 72 y.o. female who is being evaluated for colitis.     Active Hospital Problems:  Active Hospital Problems    Diagnosis    • Colitis      Plan:     Colitis  Consult to GI  IV Zosyn per GI recommendation  Stool for pcr panel and c diff    Hypokalemia  Electrolyte protocol ordered  Repeat BMP in AM    Hypertension  Monitor  Continue home meds      DVT prophylaxis:  Mechanical DVT prophylaxis orders are present.    CODE STATUS:    Level Of Support Discussed With: Patient  Code Status (Patient has no pulse and is not breathing): CPR (Attempt to Resuscitate)  Medical Interventions (Patient has pulse or is breathing): Full Support    Admission Status:  I believe this patient meets observation status.    Electronically signed by GRACIE Madison, 10/06/22, 6:26 PM EDT.         I have worn appropriate PPE during this patient encounter, sanitized my hands both with entering and exiting patient's room.

## 2022-10-07 LAB
ALBUMIN SERPL-MCNC: 4.2 G/DL (ref 3.5–5.2)
ALBUMIN/GLOB SERPL: 1.9 G/DL
ALP SERPL-CCNC: 72 U/L (ref 39–117)
ALT SERPL W P-5'-P-CCNC: 13 U/L (ref 1–33)
ANION GAP SERPL CALCULATED.3IONS-SCNC: 10.7 MMOL/L (ref 5–15)
ANION GAP SERPL CALCULATED.3IONS-SCNC: 11.2 MMOL/L (ref 5–15)
AST SERPL-CCNC: 20 U/L (ref 1–32)
BILIRUB SERPL-MCNC: 0.6 MG/DL (ref 0–1.2)
BUN SERPL-MCNC: 12 MG/DL (ref 8–23)
BUN SERPL-MCNC: 12 MG/DL (ref 8–23)
BUN/CREAT SERPL: 13 (ref 7–25)
BUN/CREAT SERPL: 13.2 (ref 7–25)
CALCIUM SPEC-SCNC: 8.6 MG/DL (ref 8.6–10.5)
CALCIUM SPEC-SCNC: 8.8 MG/DL (ref 8.6–10.5)
CHLORIDE SERPL-SCNC: 101 MMOL/L (ref 98–107)
CHLORIDE SERPL-SCNC: 103 MMOL/L (ref 98–107)
CO2 SERPL-SCNC: 28.3 MMOL/L (ref 22–29)
CO2 SERPL-SCNC: 28.8 MMOL/L (ref 22–29)
CREAT SERPL-MCNC: 0.91 MG/DL (ref 0.57–1)
CREAT SERPL-MCNC: 0.92 MG/DL (ref 0.57–1)
DEPRECATED RDW RBC AUTO: 47.3 FL (ref 37–54)
EGFRCR SERPLBLD CKD-EPI 2021: 65.9 ML/MIN/1.73
EGFRCR SERPLBLD CKD-EPI 2021: 66.8 ML/MIN/1.73
ERYTHROCYTE [DISTWIDTH] IN BLOOD BY AUTOMATED COUNT: 15.2 % (ref 12.3–15.4)
GLOBULIN UR ELPH-MCNC: 2.2 GM/DL
GLUCOSE BLDC GLUCOMTR-MCNC: 158 MG/DL (ref 70–130)
GLUCOSE SERPL-MCNC: 126 MG/DL (ref 65–99)
GLUCOSE SERPL-MCNC: 96 MG/DL (ref 65–99)
HCT VFR BLD AUTO: 35.4 % (ref 34–46.6)
HGB BLD-MCNC: 12.1 G/DL (ref 12–15.9)
MAGNESIUM SERPL-MCNC: 2 MG/DL (ref 1.6–2.4)
MCH RBC QN AUTO: 29.2 PG (ref 26.6–33)
MCHC RBC AUTO-ENTMCNC: 34.2 G/DL (ref 31.5–35.7)
MCV RBC AUTO: 85.5 FL (ref 79–97)
PLATELET # BLD AUTO: 280 10*3/MM3 (ref 140–450)
PMV BLD AUTO: 9.2 FL (ref 6–12)
POTASSIUM SERPL-SCNC: 2.8 MMOL/L (ref 3.5–5.2)
POTASSIUM SERPL-SCNC: 2.9 MMOL/L (ref 3.5–5.2)
PROT SERPL-MCNC: 6.4 G/DL (ref 6–8.5)
RBC # BLD AUTO: 4.14 10*6/MM3 (ref 3.77–5.28)
SODIUM SERPL-SCNC: 140 MMOL/L (ref 136–145)
SODIUM SERPL-SCNC: 143 MMOL/L (ref 136–145)
WBC NRBC COR # BLD: 6.81 10*3/MM3 (ref 3.4–10.8)

## 2022-10-07 PROCEDURE — 85027 COMPLETE CBC AUTOMATED: CPT | Performed by: NURSE PRACTITIONER

## 2022-10-07 PROCEDURE — 82962 GLUCOSE BLOOD TEST: CPT

## 2022-10-07 PROCEDURE — G0378 HOSPITAL OBSERVATION PER HR: HCPCS

## 2022-10-07 PROCEDURE — 80053 COMPREHEN METABOLIC PANEL: CPT | Performed by: NURSE PRACTITIONER

## 2022-10-07 PROCEDURE — 96361 HYDRATE IV INFUSION ADD-ON: CPT

## 2022-10-07 PROCEDURE — 87040 BLOOD CULTURE FOR BACTERIA: CPT | Performed by: NURSE PRACTITIONER

## 2022-10-07 PROCEDURE — 25010000002 SODIUM CHLORIDE 0.9 % WITH KCL 20 MEQ 20-0.9 MEQ/L-% SOLUTION: Performed by: INTERNAL MEDICINE

## 2022-10-07 PROCEDURE — 83735 ASSAY OF MAGNESIUM: CPT

## 2022-10-07 PROCEDURE — 96376 TX/PRO/DX INJ SAME DRUG ADON: CPT

## 2022-10-07 PROCEDURE — 99214 OFFICE O/P EST MOD 30 MIN: CPT | Performed by: INTERNAL MEDICINE

## 2022-10-07 PROCEDURE — 25010000002 ONDANSETRON PER 1 MG: Performed by: NURSE PRACTITIONER

## 2022-10-07 RX ORDER — SODIUM CHLORIDE AND POTASSIUM CHLORIDE 150; 900 MG/100ML; MG/100ML
100 INJECTION, SOLUTION INTRAVENOUS CONTINUOUS
Status: DISCONTINUED | OUTPATIENT
Start: 2022-10-07 | End: 2022-10-09

## 2022-10-07 RX ORDER — AMOXICILLIN AND CLAVULANATE POTASSIUM 875; 125 MG/1; MG/1
1 TABLET, FILM COATED ORAL 2 TIMES DAILY
Qty: 20 TABLET | Refills: 0 | Status: SHIPPED | OUTPATIENT
Start: 2022-10-07 | End: 2022-10-09 | Stop reason: HOSPADM

## 2022-10-07 RX ORDER — DROPERIDOL 2.5 MG/ML
1.25 INJECTION, SOLUTION INTRAMUSCULAR; INTRAVENOUS ONCE
Status: DISCONTINUED | OUTPATIENT
Start: 2022-10-07 | End: 2022-10-09 | Stop reason: HOSPADM

## 2022-10-07 RX ORDER — POTASSIUM CHLORIDE 750 MG/1
40 TABLET, FILM COATED, EXTENDED RELEASE ORAL AS NEEDED
Status: DISCONTINUED | OUTPATIENT
Start: 2022-10-07 | End: 2022-10-09 | Stop reason: HOSPADM

## 2022-10-07 RX ORDER — ACETAMINOPHEN 325 MG/1
650 TABLET ORAL EVERY 6 HOURS PRN
Status: DISCONTINUED | OUTPATIENT
Start: 2022-10-07 | End: 2022-10-09 | Stop reason: HOSPADM

## 2022-10-07 RX ORDER — POTASSIUM CHLORIDE 1.5 G/1.77G
40 POWDER, FOR SOLUTION ORAL AS NEEDED
Status: DISCONTINUED | OUTPATIENT
Start: 2022-10-07 | End: 2022-10-09 | Stop reason: HOSPADM

## 2022-10-07 RX ORDER — POTASSIUM CHLORIDE 7.45 MG/ML
10 INJECTION INTRAVENOUS
Status: DISCONTINUED | OUTPATIENT
Start: 2022-10-07 | End: 2022-10-09 | Stop reason: HOSPADM

## 2022-10-07 RX ADMIN — CETIRIZINE HYDROCHLORIDE 10 MG: 10 TABLET ORAL at 10:00

## 2022-10-07 RX ADMIN — POTASSIUM CHLORIDE AND SODIUM CHLORIDE 100 ML/HR: 900; 150 INJECTION, SOLUTION INTRAVENOUS at 21:27

## 2022-10-07 RX ADMIN — OLANZAPINE 2.5 MG: 2.5 TABLET ORAL at 22:58

## 2022-10-07 RX ADMIN — Medication 10 ML: at 09:59

## 2022-10-07 RX ADMIN — POTASSIUM CHLORIDE 40 MEQ: 750 TABLET, EXTENDED RELEASE ORAL at 14:09

## 2022-10-07 RX ADMIN — PANTOPRAZOLE SODIUM 40 MG: 40 TABLET, DELAYED RELEASE ORAL at 09:59

## 2022-10-07 RX ADMIN — OXYBUTYNIN CHLORIDE 10 MG: 10 TABLET, EXTENDED RELEASE ORAL at 10:00

## 2022-10-07 RX ADMIN — VILAZODONE HYDROCHLORIDE 40 MG: 40 TABLET, FILM COATED ORAL at 10:00

## 2022-10-07 RX ADMIN — PREGABALIN 300 MG: 100 CAPSULE ORAL at 16:31

## 2022-10-07 RX ADMIN — Medication 10 ML: at 21:39

## 2022-10-07 RX ADMIN — Medication 100 MG: at 09:59

## 2022-10-07 RX ADMIN — LISINOPRIL 20 MG: 20 TABLET ORAL at 21:27

## 2022-10-07 RX ADMIN — PREGABALIN 150 MG: 75 CAPSULE ORAL at 09:58

## 2022-10-07 RX ADMIN — HYDROCODONE BITARTRATE AND ACETAMINOPHEN 1 TABLET: 5; 325 TABLET ORAL at 21:38

## 2022-10-07 RX ADMIN — POTASSIUM CHLORIDE 40 MEQ: 750 TABLET, EXTENDED RELEASE ORAL at 09:59

## 2022-10-07 RX ADMIN — LISINOPRIL 20 MG: 20 TABLET ORAL at 09:59

## 2022-10-07 RX ADMIN — ONDANSETRON 4 MG: 2 INJECTION INTRAMUSCULAR; INTRAVENOUS at 04:12

## 2022-10-07 RX ADMIN — ACETAMINOPHEN 650 MG: 325 TABLET, FILM COATED ORAL at 02:26

## 2022-10-07 NOTE — CONSULTS
CONSULT NOTE    INTERNAL MEDICINE   [facility]       Patient Identification:  Name: Jaylyn Fuentes  Age: 72 y.o.  Sex: female  :  1950  MRN: [id number]             Date of Consultation:  10/07/22          Primary Care Physician: Annie Romero MD                               Requesting Physician: dr cruz  Reason for Consultation:assuming care    Chief Complaint:  72 year old female who was sent to the observation unit with abdominal pain and nausea; she was seen in the ED a few days ago sent home with zofran but did not get better; she has a history of chronic pancreatitis; ct demonstrated colitis; she was seen by gi and endoscopy was planned so she will require admission to the hospital    History of Present Illness:   As above      Past Medical History:  Past Medical History:   Diagnosis Date   • Abdominal pain    • Abnormal CT of the abdomen    • Abnormal glucose    • Abnormal liver enzymes    • Abnormal urinalysis    • Achilles tendinitis     left   • Acid reflux disease    • Acromioclavicular joint arthritis    • Acromioclavicular joint pain, bilateral    • Aftercare following knee joint replacement surgery    • Alcohol abuse    • Alternating constipation and diarrhea    • Anemia    • Arthralgia of multiple sites    • Arthritis    • BMI 34.0-34.9,adult    • Carpal tunnel syndrome    • Cataract    • Cellulitis of extremity    • Chronic insomnia    • Chronic kidney disease, stage III (moderate) (HCC)    • Contact dermatitis    • Contusion of bone    • Cough    • Decreased range of motion    • Degeneration of cervical intervertebral disc    • Depressed    • Dermatitis    • Diarrhea    • Dry eye    • Dysphagia    • Edema of extremities    • Esophagitis    • Fatigue    • Flu vaccine need    • GERD (gastroesophageal reflux disease)    • Glenohumeral arthritis, left    • Hemorrhoids    • Hiatal hernia    • Hip pain    • History of colonic polyps    • History of DVT in adulthood     • History of transfusion    • Hyperactivity of bladder    • Hyperlipidemia    • Hypertension    • Hypokalemia    • Irritable bowel syndrome with diarrhea    • Lactose intolerance    • Left ankle pain    • Leg cramps    • Leg swelling    • Limitation of joint motion of ankle, left    • Lower extremity tendinopathy    • Medicare annual wellness visit, subsequent    • Methicillin resistant Staphylococcus aureus infection    • Microscopic hematuria    • Migraine    • Obstructive sleep apnea    • Osteoarthritis, chronic    • Pain, foot, left, chronic    • Pancreatitis, chronic (HCC)    • Pes planus    • PONV (postoperative nausea and vomiting)     SCOPOLAMINE HELPS-USED DURING LAST SURGERY    • Rectal leakage    • Retrocalcaneal bursitis    • Right ankle instability    • Right ankle pain    • Right ankle sprain    • Right ankle swelling    • Rotator cuff tear, left    • S/P shoulder surgery    • Shoulder region pain    • Spinal stenosis    • Swallowing difficulty    • Urge incontinence of urine    • Urinary incontinence    • Visit for screening mammogram    • Wears contact lenses    • Wears dentures     UPPER AND LOWER    • Wears hearing aid     DOESN'T HAVE WITH HER   • Weight gain      Past Surgical History:  Past Surgical History:   Procedure Laterality Date   • BACK SURGERY      LUMBAR FUSION    • CERVICAL FUSION      plates, rods, and screws   • CHOLECYSTECTOMY     • COLONOSCOPY      2 YEARS AGO    • ENDOSCOPY N/A 10/14/2021    Procedure: ESOPHAGOGASTRODUODENOSCOPY with biopsy and polypectomy;  Surgeon: Hussain Basilio MD;  [place] [facility];  Service: Gastroenterology;  Laterality: N/A;  gastritis and polyps   • EYE SURGERY      LASER FOR KERITONOSIS    • GALLBLADDER SURGERY     • HERNIA REPAIR     • HYSTERECTOMY     • INGUINAL HERNIA REPAIR     • JOINT REPLACEMENT      BILATERAL KNEES, BILATERAL HIPS   • NECK SURGERY     • ND RECONSTR TOTAL SHOULDER IMPLANT Left 2/13/2017    Procedure: TOTAL SHOULDER  REPLACEMENT LEFT;  Surgeon: Benigno Chavez MD;  Location: [facility];  Service: Orthopedics   • REPLACEMENT TOTAL KNEE     • SHOULDER LIGAMENT REPAIR Right    • TONSILLECTOMY     • TOTAL HIP ARTHROPLASTY     • UPPER GASTROINTESTINAL ENDOSCOPY        Home Meds:  Medications Prior to Admission   Medication Sig Dispense Refill Last Dose   • cetirizine (zyrTEC) 10 MG tablet Take 10 mg by mouth Daily As Needed.      • dexlansoprazole (Dexilant) 60 MG capsule Take 1 capsule by mouth Daily.      • diphenoxylate-atropine (LOMOTIL) 2.5-0.025 MG per tablet Take 1 tablet by mouth 4 (Four) Times a Day As Needed for diarrhea.      • fesoterodine fumarate (TOVIAZ ER) 8 MG tablet sustained-release 24 hour capsule Take 8 mg by mouth Daily.      • HYDROcodone-acetaminophen (NORCO) 5-325 MG per tablet Take 1 tablet by mouth Every 4 (Four) Hours As Needed for Moderate Pain . 10 tablet 0    • lisinopril (PRINIVIL,ZESTRIL) 20 MG tablet Take 20 mg by mouth 2 (Two) Times a Day.      • OLANZapine (zyPREXA) 2.5 MG tablet Take 2.5 mg by mouth Every Night.      • ondansetron (ZOFRAN) 8 MG tablet Take 1 tablet by mouth Every 8 (Eight) Hours As Needed for Vomiting. 15 tablet 0    • pregabalin (LYRICA) 150 MG capsule Take 300 mg by mouth Every Evening.      • pregabalin (LYRICA) 150 MG capsule Take 1 capsule by mouth Every Morning.      • Riboflavin 100 MG tablet Take 100 mg by mouth Daily.      • vilazodone (VIIBRYD) 40 MG tablet tablet Take 40 mg by mouth Daily.        Current Meds:     Current Facility-Administered Medications:   •  acetaminophen (TYLENOL) tablet 650 mg, 650 mg, Oral, Q6H PRN, Valeri Smallshy S, APRN, 650 mg at 10/07/22 0226  •  cetirizine (zyrTEC) tablet 10 mg, 10 mg, Oral, Daily PRN, Hilary Whiting, APRN, 10 mg at 10/07/22 1000  •  dicyclomine (BENTYL) injection 20 mg, 20 mg, Intramuscular, 4x Daily PRN, Karine Smlals S, APRN  •  droperidol (INAPSINE) injection 1.25 mg, 1.25 mg, Intravenous, Once, Karine Smalls, APRN  •   "HYDROcodone-acetaminophen (NORCO) 5-325 MG per tablet 1 tablet, 1 tablet, Oral, Q4H PRN, Herth, Hilary, APRN  •  lisinopril (PRINIVIL,ZESTRIL) tablet 20 mg, 20 mg, Oral, BID, Herth, Hilary, APRN, 20 mg at 10/07/22 0959  •  nitroglycerin (NITROSTAT) SL tablet 0.4 mg, 0.4 mg, Sublingual, Q5 Min PRN, [facility], Hilary, APRN  •  OLANZapine (zyPREXA) tablet 2.5 mg, 2.5 mg, Oral, Nightly, Herth, Hilary, APRN  •  ondansetron (ZOFRAN) tablet 4 mg, 4 mg, Oral, Q6H PRN **OR** ondansetron (ZOFRAN) injection 4 mg, 4 mg, Intravenous, Q6H PRN, [facility], [facility], APRN, 4 mg at 10/07/22 0412  •  ondansetron (ZOFRAN) tablet 8 mg, 8 mg, Oral, Q8H PRN, [facility], [facility], APRN  •  oxybutynin XL (DITROPAN-XL) 24 hr tablet 10 mg, 10 mg, Oral, Daily, Herth, [facility], APRN, 10 mg at 10/07/22 1000  •  pantoprazole (PROTONIX) EC tablet 40 mg, 40 mg, Oral, QAM, Herth, Hilary, APRN, 40 mg at 10/07/22 0959  •  potassium chloride (K-DUR,KLOR-CON) ER tablet 40 mEq, 40 mEq, Oral, PRN, 40 mEq at 10/07/22 1409 **OR** potassium chloride (KLOR-CON) packet 40 mEq, 40 mEq, Oral, PRN **OR** potassium chloride 10 mEq in 100 mL IVPB, 10 mEq, Intravenous, Q1H PRN, Karine Smalls, GRACIE  •  pregabalin (LYRICA) capsule 150 mg, 150 mg, Oral, QAM, Herth, Hilary, APRN, 150 mg at 10/07/22 0958  •  pregabalin (LYRICA) capsule 300 mg, 300 mg, Oral, Q PM, Herth, Hilary, APRN, 300 mg at 10/07/22 1631  •  sodium chloride 0.9 % flush 10 mL, 10 mL, Intravenous, PRN, Chandan Salazar MD  •  sodium chloride 0.9 % flush 10 mL, 10 mL, Intravenous, Q12H, Hilary Whiting APRN, 10 mL at 10/07/22 0959  •  sodium chloride 0.9 % flush 10 mL, 10 mL, Intravenous, PRN, Hilary Whiting APRN  •  vilazodone (VIIBRYD) tablet 40 mg, 40 mg, Oral, Daily, Hilary Whiting APRN, 40 mg at 10/07/22 1000  •  Vitamin B-2 (RIBOFLAVIN) tablet 100 mg, 100 mg, Oral, Daily, Hilary Whiting APRN, 100 mg at 10/07/22 0959  Allergies:  Allergies   Allergen Reactions   • Baclofen Other (See Comments)     \"makes me crazy\"   • Keflex " "[Cephalexin] GI Intolerance   • Melatonin Confusion   • Sulfa Antibiotics Rash     SKIN BRIGHT RED    • Tramadol Anxiety, Other (See Comments) and Confusion     Per pt's MD     Social History:   Social History     Socioeconomic History   • Marital status: Single   Tobacco Use   • Smoking status: Former Smoker   • Smokeless tobacco: Never Used   Vaping Use   • Vaping Use: Never used   Substance and Sexual Activity   • Alcohol use: Yes     Comment: [employer]    • Drug use: No   • Sexual activity: Defer     Family History:  Family History   Problem Relation Age of Onset   • Arthritis Other    • Colonic polyp Other    • Coronary artery disease Other           Review of Systems  See history of present illness and past medical history.  Patient denies headache, dizziness, syncope, falls, trauma, change in vision, change in hearing, change in taste, changes in weight, changes in appetite, focal weakness, numbness, or paresthesia.  Patient denies chest pain, palpitations, dyspnea, orthopnea, PND, cough, sinus pressure, rhinorrhea, epistaxis, hemoptysis, nausea, vomiting,hematemesis, diarrhea, constipation or hematchezia.  Denies cold or heat intolerance, polydipsia, polyuria, polyphagia. Denies hematuria, pyuria, dysuria, hesitancy, frequency or urgency. Denies consumption of raw and under cooked meats foods or change in water source.  Denies fever, chills, sweats, night sweats.  Denies missing any routine medications. Remainder of ROS is negative.      Vitals:   /81 (BP Location: Left arm, Patient Position: Lying)   Pulse 71   Temp 98.5 °F (36.9 °C) (Oral)   Resp 16   Ht 161.5 cm (63.6\")   Wt 93.9 kg (207 lb)   SpO2 97%   BMI 35.98 kg/m²   I/O:     Intake/Output Summary (Last 24 hours) at 10/7/2022 1740  Last data filed at 10/6/2022 1750  Gross per 24 hour   Intake 1000 ml   Output --   Net 1000 ml     Exam:  General Appearance:    Alert, cooperative, no distress, appears stated age   Head:    " Normocephalic, without obvious abnormality, atraumatic   Eyes:    PERRL, conjunctivae/corneas clear, EOM's intact, both eyes   Ears:    Normal external ear canals, both ears   Nose:   Nares normal, septum midline, mucosa normal, no drainage    or sinus tenderness   Throat:   Lips, tongue, gums normal; oral mucosa pink and moist   Neck:   Supple, symmetrical, trachea midline, no adenopathy;     thyroid:  no enlargement/tenderness/nodules; no carotid    bruit or JVD   Back:     Symmetric, no curvature, ROM normal, no CVA tenderness   Lungs:     Decreased breath sounds bilaterally, respirations unlabored   Chest Wall:    No tenderness or deformity    Heart:    Regular rate and rhythm, S1 and S2 normal, no murmur, rub   or gallop   Abdomen:     Soft, nontender, bowel sounds active all four quadrants,     no masses, no hepatomegaly, no splenomegaly   Extremities:   Extremities normal, atraumatic, no cyanosis or edema   Pulses:   Pulses palpable in all extremities; symmetric all extremities   Skin:   Skin color normal, Skin is warm and dry,  no rashes or palpable lesions   Neurologic:   CNII-XII intact, motor strength grossly intact, sensation grossly intact to light touch, no focal deficits noted       Data Review:  Labs in chart were reviewed.  WBC   Date Value Ref Range Status   10/07/2022 6.81 3.40 - 10.80 10*3/mm3 Final     Hemoglobin   Date Value Ref Range Status   10/07/2022 12.1 12.0 - 15.9 g/dL Final     Hematocrit   Date Value Ref Range Status   10/07/2022 35.4 34.0 - 46.6 % Final     Platelets   Date Value Ref Range Status   10/07/2022 280 140 - 450 10*3/mm3 Final     Sodium   Date Value Ref Range Status   10/07/2022 140 136 - 145 mmol/L Final     Potassium   Date Value Ref Range Status   10/07/2022 2.9 (L) 3.5 - 5.2 mmol/L Final     Chloride   Date Value Ref Range Status   10/07/2022 101 98 - 107 mmol/L Final     CO2   Date Value Ref Range Status   10/07/2022 28.3 22.0 - 29.0 mmol/L Final     BUN   Date Value  Ref Range Status   10/07/2022 12 8 - 23 mg/dL Final     Creatinine   Date Value Ref Range Status   10/07/2022 0.91 0.57 - 1.00 mg/dL Final     Glucose   Date Value Ref Range Status   10/07/2022 126 (H) 65 - 99 mg/dL Final     Calcium   Date Value Ref Range Status   10/07/2022 8.8 8.6 - 10.5 mg/dL Final     Magnesium   Date Value Ref Range Status   10/07/2022 2.0 1.6 - 2.4 mg/dL Final     AST (SGOT)   Date Value Ref Range Status   10/07/2022 20 1 - 32 U/L Final     ALT (SGPT)   Date Value Ref Range Status   10/07/2022 13 1 - 33 U/L Final     Alkaline Phosphatase   Date Value Ref Range Status   10/07/2022 72 39 - 117 U/L Final     No results found for: APTT, INR            Imaging Results (Last 7 Days)     Procedure Component Value Units Date/Time    CT Abdomen Pelvis With Contrast [[id number]] Collected: 10/06/22 1638     Updated: 10/06/22 1646    Narrative:      CT ABDOMEN PELVIS W CONTRAST-     INDICATIONS: Pain     TECHNIQUE: Radiation dose reduction techniques were utilized, including  automated exposure control and exposure modulation based on body size.  Enhanced ABDOMEN AND PELVIS CT     COMPARISON: 9/15/2021     FINDINGS:     The gallbladder is surgically absent, with mild intrahepatic and  extrahepatic biliary ductal dilatation. Likely focal fat is seen  anteriorly at the left hepatic lobe..     No hydronephrosis is noted. Portions of the distal ureters and urinary  bladder are obscured by bilateral hip arthroplasty hardware artifact.     Otherwise unremarkable appearance of the liver, spleen, adrenal glands,  pancreas, kidneys, bladder.     No bowel obstruction. Appearance of mild wall thickening predominantly  the transverse and descending colon is likely at least in part from lack  of distention, but could be evidence of mild nonspecific colitis.  Minimal hiatal hernia is apparent. Colonic diverticula are seen that do  not appear inflamed.     No free intraperitoneal gas or free fluid.     Scattered small  mesenteric and para-aortic lymph nodes are seen that are  not significant by size criteria.     Abdominal aorta is not aneurysmal. Aortic and other arterial  calcifications are present.     The lung bases show mild atelectasis.     Degenerative and surgical changes are seen in the spine. No acute  fracture is identified.             Impression:            1. Appearance of mild wall thickening predominantly the transverse and  descending colon is likely at least in part from lack of distention, but  could be evidence of mild nonspecific colitis. Colonic diverticulosis.  2. No obstructive uropathy.     This report was finalized on 10/6/2022 4:43 PM by Dr. Serg Figueroa M.D.           Past Medical History:   Diagnosis Date   • Abdominal pain    • Abnormal CT of the abdomen    • Abnormal glucose    • Abnormal liver enzymes    • Abnormal urinalysis    • Achilles tendinitis     left   • Acid reflux disease    • Acromioclavicular joint arthritis    • Acromioclavicular joint pain, bilateral    • Aftercare following knee joint replacement surgery    • Alcohol abuse    • Alternating constipation and diarrhea    • Anemia    • Arthralgia of multiple sites    • Arthritis    • BMI 34.0-34.9,adult    • Carpal tunnel syndrome    • Cataract    • Cellulitis of extremity    • Chronic insomnia    • Chronic kidney disease, stage III (moderate) ([facility])    • Contact dermatitis    • Contusion of bone    • Cough    • Decreased range of motion    • Degeneration of cervical intervertebral disc    • Depressed    • Dermatitis    • Diarrhea    • Dry eye    • Dysphagia    • Edema of extremities    • Esophagitis    • Fatigue    • Flu vaccine need    • GERD (gastroesophageal reflux disease)    • Glenohumeral arthritis, left    • Hemorrhoids    • Hiatal hernia    • Hip pain    • History of colonic polyps    • History of DVT in adulthood    • History of transfusion    • Hyperactivity of bladder    • Hyperlipidemia    • Hypertension    •  Hypokalemia    • Irritable bowel syndrome with diarrhea    • Lactose intolerance    • Left ankle pain    • Leg cramps    • Leg swelling    • Limitation of joint motion of ankle, left    • Lower extremity tendinopathy    • Medicare annual wellness visit, subsequent    • Methicillin resistant Staphylococcus aureus infection    • Microscopic hematuria    • Migraine    • Obstructive sleep apnea    • Osteoarthritis, chronic    • Pain, foot, left, chronic    • Pancreatitis, chronic (HCC)    • Pes planus    • PONV (postoperative nausea and vomiting)     SCOPOLAMINE HELPS-USED DURING LAST SURGERY    • Rectal leakage    • Retrocalcaneal bursitis    • Right ankle instability    • Right ankle pain    • Right ankle sprain    • Right ankle swelling    • Rotator cuff tear, left    • S/P shoulder surgery    • Shoulder region pain    • Spinal stenosis    • Swallowing difficulty    • Urge incontinence of urine    • Urinary incontinence    • Visit for screening mammogram    • Wears contact lenses    • Wears dentures     UPPER AND LOWER    • Wears hearing aid     DOESN'T HAVE WITH HER   • Weight gain        Assessment:  Active Hospital Problems    Diagnosis  POA   • Colitis [K52.9]  Yes      Resolved Hospital Problems   No resolved problems to display.   hypertension  Nausea and vomiting  Hypokalemia  Hyperglycemia  Obesity  Chronic pancreatitis    Plan:  Will replace potassium  Gi is planning for endoscopy  Trend blood sugar  Monitor on telemetry  Continue antiemetics, fluids  D.w patient and provider from the observation unit    Noris Arriola MD   10/7/2022  17:40 EDT

## 2022-10-07 NOTE — PLAN OF CARE
Goal Outcome Evaluation:               Patient has been restless the entire night getting up and down in her room. She is complaining of wanting to use the restroom and then not go when she gets there. Vss. Will continue to monitor for any changes

## 2022-10-07 NOTE — CONSULTS
Gastroenterology   Initial Inpatient Consult Note    Referring Provider: Hilary Whiting    Reason for Consultation: colitis on ct    Subjective     History of present illness:    72 y.o. female who we are asked to see in the observation unit.  She was previously seen in the emergency room 4 days ago with normal labs and was sent home.  This was for abdominal pain and nausea and vomiting diarrhea.  This did not improve so she returns for evaluation.  She does have a history of IBS and stage III kidney disease.  She reported low-grade temperatures and urgent need for bowel movements that are not always productive.  CT scan reveals mild wall thickening predominantly in the transverse and descending colon partially from lack of distention but could be evidence of a nonspecific colitis.  There is diverticulosis.    Started on antibiotics in the ER   UPPER GI ENDOSCOPY (10/14/2021 10:27) erosions  SCANNED - COLONOSCOPY (05/11/2018) mild erosions of the sigmoid    Potassium 2.8  Hemoglobin 12.1  White count 6.81    Past Medical History:  Past Medical History:   Diagnosis Date   • Abdominal pain    • Abnormal CT of the abdomen    • Abnormal glucose    • Abnormal liver enzymes    • Abnormal urinalysis    • Achilles tendinitis     left   • Acid reflux disease    • Acromioclavicular joint arthritis    • Acromioclavicular joint pain, bilateral    • Aftercare following knee joint replacement surgery    • Alcohol abuse    • Alternating constipation and diarrhea    • Anemia    • Arthralgia of multiple sites    • Arthritis    • BMI 34.0-34.9,adult    • Carpal tunnel syndrome    • Cataract    • Cellulitis of extremity    • Chronic insomnia    • Chronic kidney disease, stage III (moderate) ([facility])    • Contact dermatitis    • Contusion of bone    • Cough    • Decreased range of motion    • Degeneration of cervical intervertebral disc    • Depressed    • Dermatitis    • Diarrhea    • Dry eye    • Dysphagia    • Edema of extremities    •  Esophagitis    • Fatigue    • Flu vaccine need    • GERD (gastroesophageal reflux disease)    • Glenohumeral arthritis, left    • Hemorrhoids    • Hiatal hernia    • Hip pain    • History of colonic polyps    • History of DVT in adulthood    • History of transfusion    • Hyperactivity of bladder    • Hyperlipidemia    • Hypertension    • Hypokalemia    • Irritable bowel syndrome with diarrhea    • Lactose intolerance    • Left ankle pain    • Leg cramps    • Leg swelling    • Limitation of joint motion of ankle, left    • Lower extremity tendinopathy    • Medicare annual wellness visit, subsequent    • Methicillin resistant Staphylococcus aureus infection    • Microscopic hematuria    • Migraine    • Obstructive sleep apnea    • Osteoarthritis, chronic    • Pain, foot, left, chronic    • Pancreatitis, chronic (HCC)    • Pes planus    • PONV (postoperative nausea and vomiting)     SCOPOLAMINE HELPS-USED DURING LAST SURGERY    • Rectal leakage    • Retrocalcaneal bursitis    • Right ankle instability    • Right ankle pain    • Right ankle sprain    • Right ankle swelling    • Rotator cuff tear, left    • S/P shoulder surgery    • Shoulder region pain    • Spinal stenosis    • Swallowing difficulty    • Urge incontinence of urine    • Urinary incontinence    • Visit for screening mammogram    • Wears contact lenses    • Wears dentures     UPPER AND LOWER    • Wears hearing aid     DOESN'T HAVE WITH HER   • Weight gain      Past Surgical History:  Past Surgical History:   Procedure Laterality Date   • BACK SURGERY      LUMBAR FUSION    • CERVICAL FUSION      plates, rods, and screws   • CHOLECYSTECTOMY     • COLONOSCOPY      2 YEARS AGO    • ENDOSCOPY N/A 10/14/2021    Procedure: ESOPHAGOGASTRODUODENOSCOPY with biopsy and polypectomy;  Surgeon: Hussain Basilio MD;  Location: [facility] OR;  Service: Gastroenterology;  Laterality: N/A;  gastritis and polyps   • EYE SURGERY      LASER FOR KERITONOSIS    • GALLBLADDER  SURGERY     • HERNIA REPAIR     • HYSTERECTOMY     • INGUINAL HERNIA REPAIR     • JOINT REPLACEMENT      BILATERAL KNEES, BILATERAL HIPS   • NECK SURGERY     • MS RECONSTR TOTAL SHOULDER IMPLANT Left 2/13/2017    Procedure: TOTAL SHOULDER REPLACEMENT LEFT;  Surgeon: Benigno Chavez MD;  Location: [facility];  Service: Orthopedics   • REPLACEMENT TOTAL KNEE     • SHOULDER LIGAMENT REPAIR Right    • TONSILLECTOMY     • TOTAL HIP ARTHROPLASTY     • UPPER GASTROINTESTINAL ENDOSCOPY        Social History:   Social History     Tobacco Use   • Smoking status: Former Smoker   • Smokeless tobacco: Never Used   Substance Use Topics   • Alcohol use: Yes     Comment: [employer] SOCIALLY       Family History:  Family History   Problem Relation Age of Onset   • Arthritis Other    • Colonic polyp Other    • Coronary artery disease Other        Home Meds:  Medications Prior to Admission   Medication Sig Dispense Refill Last Dose   • cetirizine (zyrTEC) 10 MG tablet Take 10 mg by mouth Daily As Needed.      • dexlansoprazole (Dexilant) 60 MG capsule Take 1 capsule by mouth Daily.      • diphenoxylate-atropine (LOMOTIL) 2.5-0.025 MG per tablet Take 1 tablet by mouth 4 (Four) Times a Day As Needed for diarrhea.      • fesoterodine fumarate (TOVIAZ ER) 8 MG tablet sustained-release 24 hour capsule Take 8 mg by mouth Daily.      • HYDROcodone-acetaminophen (NORCO) 5-325 MG per tablet Take 1 tablet by mouth Every 4 (Four) Hours As Needed for Moderate Pain . 10 tablet 0    • lisinopril (PRINIVIL,ZESTRIL) 20 MG tablet Take 20 mg by mouth 2 (Two) Times a Day.      • OLANZapine (zyPREXA) 2.5 MG tablet Take 2.5 mg by mouth Every Night.      • ondansetron (ZOFRAN) 8 MG tablet Take 1 tablet by mouth Every 8 (Eight) Hours As Needed for Vomiting. 15 tablet 0    • pregabalin (LYRICA) 150 MG capsule Take 300 mg by mouth Every Evening.      • pregabalin (LYRICA) 150 MG capsule Take 1 capsule by mouth Every Morning.      • Riboflavin 100 MG  "tablet Take 100 mg by mouth Daily.      • vilazodone (VIIBRYD) 40 MG tablet tablet Take 40 mg by mouth Daily.        Current Meds:   droperidol, 1.25 mg, Intravenous, Once  lisinopril, 20 mg, Oral, BID  OLANZapine, 2.5 mg, Oral, Nightly  oxybutynin XL, 10 mg, Oral, Daily  pantoprazole, 40 mg, Oral, QAM  pregabalin, 150 mg, Oral, QAM  pregabalin, 300 mg, Oral, Q PM  sodium chloride, 10 mL, Intravenous, Q12H  vilazodone, 40 mg, Oral, Daily  Vitamin B-2, 100 mg, Oral, Daily      Allergies:  Allergies   Allergen Reactions   • Baclofen Other (See Comments)     \"makes me crazy\"   • Keflex [Cephalexin] GI Intolerance   • Melatonin Confusion   • Sulfa Antibiotics Rash     SKIN BRIGHT RED    • Tramadol Anxiety, Other (See Comments) and Confusion     Per pt's MD     Review of Systems  Pertinent items are noted in HPI, all other systems reviewed and negative    Objective     Vital Signs  Temp:  [98 °F (36.7 °C)-98.7 °F (37.1 °C)] 98.2 °F (36.8 °C)  Heart Rate:  [61-79] 69  Resp:  [14-18] 16  BP: (147-185)/() 172/76    Physical Exam:  CONSTITUTIONAL:  today's vital signs reviewed  EARS NOSE THROAT: trachea midline and no deformity of the nares  EYES: no scleral icterus  GASTROINTESTINAL: abdomen is soft, nontender, nondistended with normal active bowel sounds, no masses are appreciated  PSYCHIATRIC: appropriate mood and affect  RESPIRATORY: normal inspiratory effort with no increased work of breathing  NEUROLOGIC: patient is awake and alert  DERMATOLOGIC: skin is warm with no cyanosis  LYMPHATIC: no periumbilical lymphadenopathy     Results Review:              I reviewed the patient's new clinical results.    Results from last 7 days   Lab Units 10/07/22  0355 10/06/22  1314 10/03/22  0953   WBC 10*3/mm3 6.81 7.02 5.95   HEMOGLOBIN g/dL 12.1 13.7 12.4   HEMATOCRIT % 35.4 39.8 38.8   PLATELETS 10*3/mm3 280 339 293     Results from last 7 days   Lab Units 10/07/22  0355 10/06/22  1314 10/03/22  0953   SODIUM mmol/L 143 141 " 142   POTASSIUM mmol/L 2.8* 3.2* 3.6   CHLORIDE mmol/L 103 98 102   CO2 mmol/L 28.8 29.9* 26.6   BUN mg/dL 12 13 11   CREATININE mg/dL 0.92 0.90 0.94   CALCIUM mg/dL 8.6 9.2 8.9   BILIRUBIN mg/dL 0.6 0.6 0.5   ALK PHOS U/L 72 79 83   ALT (SGPT) U/L 13 12 9   AST (SGOT) U/L 20 18 18   GLUCOSE mg/dL 96 106* 109*         Lab Results   Lab Value Date/Time    LIPASE 36 10/06/2022 1314    LIPASE 15 09/12/2021 1320    LIPASE 16 02/14/2020 2046    LIPASE 85 06/06/2018 1001       Radiology:  CT Abdomen Pelvis With Contrast   Final Result           1. Appearance of mild wall thickening predominantly the transverse and   descending colon is likely at least in part from lack of distention, but   could be evidence of mild nonspecific colitis. Colonic diverticulosis.   2. No obstructive uropathy.       This report was finalized on 10/6/2022 4:43 PM by Dr. Serg Figueroa M.D.              Assessment & Plan   Patient Active Problem List   Diagnosis   • Osteoarthrosis, localized, primary, shoulder region   • Hypertension   • Depressed   • Status post total replacement of left shoulder   • Duodenitis   • Abnormal CT scan, small bowel   • Nausea and vomiting   • SIN (obstructive sleep apnea)   • Snoring   • Hypersomnia   • Non-restorative sleep   • Obesity (BMI 30-39.9)   • Poor memory   • Frequent falls   • Migraine headache   • Altered mental status   • Dehydration   • Encephalopathy, toxic   • Polypharmacy   • Chronic prescription opiate use   • Urinary incontinence   • Colitis       Assessment:  1. Nausea vomiting abdominal pain in the setting of the patient with history of mild sigmoid inflammation on colonoscopy 2018 and mild erosion on EGD 2020.  CT scan with possible thickening in the colon versus under distention  2. Hypokalemia  3. Mild abdominal pain, LLQ    Plan:  · Treatment is supportive currently  · Replete electrolytes today  · Antiemetics and hydration  · Evaluation with colonoscopy or bidirectional endoscopy  based on the patient's clinical course and this can be done inpatient or outpatient based on how she feels and tolerates po  · ? abx if no clinical improvement  · Pt understandably wants to go home but I let her know we need to replete her electrolytes and make sure she can tolerate po      I discussed the patients findings and my recommendations with patient and nursing staff.           You Sheth M.D.  [facility] [place] [address]  Office: [phone]

## 2022-10-07 NOTE — CASE MANAGEMENT/SOCIAL WORK
Discharge Planning Assessment  [facility]     Patient Name: Jaylyn Fuentes  MRN: [id number]  Today's Date: 10/7/2022    Admit Date: 10/6/2022    Plan: Introduced self and explained role of CCP. PPE used. Info on facesheet verified   Discharge Needs Assessment     Row Name 10/07/22 1017       Living Environment    People in Home friend(s)    Name(s) of People in Home elissa/MYAH Smith    Current Living Arrangements home    Primary Care Provided by self    Provides Primary Care For no one    Family Caregiver if Needed friend(s)    Family Caregiver Names friend/MYAH Smith    Quality of Family Relationships supportive    Able to Return to Prior Arrangements yes       Resource/Environmental Concerns    Resource/Environmental Concerns none       Transition Planning    Patient/Family Anticipates Transition to home with family    Patient/Family Anticipated Services at Transition none    Transportation Anticipated family or friend will provide       Discharge Needs Assessment    Equipment Currently Used at Home cpap;other (see comments)  walking stick prn    Concerns to be Addressed no discharge needs identified    Anticipated Changes Related to Illness none    Equipment Needed After Discharge none    Provided Post Acute Provider List? N/A    Provided Post Acute Provider Quality & Resource List? N/A               Discharge Plan     Row Name 10/07/22 1019       Plan    Plan Introduced self and explained role of CCP. PPE used. Info on facesheet verified    Provided Post Acute Provider List? N/A    Provided Post Acute Provider Quality & Resource List? N/A    Plan Comments Lives at home w/ friend/MYAH Smith and plans to return at d/c. Can arrange own transport. Uses walking stick prn and CPAP. Independent w/ ADLs. Denies any d/c needs or financial concerns              Continued Care and Services - Admitted Since 10/6/2022    Coordination has not been started for this encounter.          Demographic Summary    No  documentation.                Functional Status    No documentation.                Psychosocial    No documentation.                Abuse/Neglect    No documentation.                Legal    No documentation.                Substance Abuse    No documentation.                Patient Forms    No documentation.                   Kinjal Cummings RN     Epidermal Closure Graft Donor Site (Optional): simple interrupted

## 2022-10-07 NOTE — PLAN OF CARE
Goal Outcome Evaluation:    Patient has had no complaints of abdominal pain of discomfort, or nausea. The patient remains in contact plus isolation per infectious disease, but has not had a bm during this stay. The plan is to transfer for inpatient scope.

## 2022-10-07 NOTE — PROGRESS NOTES
ED OBSERVATION PROGRESS/DISCHARGE SUMMARY    Date of Admission: 10/6/2022   LOS: 0 days   PCP: Annie Romero MD    Patient seen at: Observation unit  Subjective   No acute events overnight.  Hospital Outcome:   72-year-old female was admitted to the observation unit for further evaluation of her abdominal pain.  She was seen in the emergency department 3 days ago had a normal lab work-up and was discharged home per her request.  She returned yesterday with worsening abdominal pain, nausea, vomiting, diarrhea.       CT abdomen and pelvis with contrast shows appearance of a mild wall thickening predominantly in the transverse descending colon is likely at least in part from her lack of distention but could be evidence of mild nonspecific colitis.  Stool cultures were ordered but she has not been able to provide a specimen while here.  Patient was seen and evaluated by Dr. Sheth with gastroenterology service he recommended antiemetics, hydration, electrolyte replacement.    She was offered bidirectional endoscopy as an inpatient or outpatient and she reports she would like to do this while she is already here.     I have spoken with Dr. Arriola who graciously accepts to admit to her service in transfer to [facility].     ROS:  General: no fevers, chills  Respiratory: no cough, dyspnea  Cardiovascular: no chest pain, palpitations  Abdomen: No abdominal pain, nausea, vomiting, or diarrhea  Neurologic: No focal weakness    Objective   Physical Exam:  I have reviewed the vital signs.  Temp:  [98 °F (36.7 °C)-98.7 °F (37.1 °C)] 98.2 °F (36.8 °C)  Heart Rate:  [61-79] 69  Resp:  [14-18] 16  BP: (147-185)/() 172/76  General Appearance:    Alert, cooperative, no distress  Head:    Normocephalic, atraumatic  Eyes:    Sclerae anicteric  Neck:   Supple, no mass  Lungs: Clear to auscultation bilaterally, respirations unlabored  Heart: Regular rate and rhythm, S1 and S2 normal, no murmur, rub or gallop  Abdomen:  Soft,  non-tender, bowel sounds active, nondistended  Extremities: No clubbing, cyanosis, or edema to lower extremities  Pulses:  2+ and symmetric in distal lower extremities  Skin: No rashes   Neurologic: Oriented x3, Normal strength to extremities    Results Review:    I have reviewed the labs, radiology results and diagnostic studies.    Results from last 7 days   Lab Units 10/06/22  1314   WBC 10*3/mm3 7.02   HEMOGLOBIN g/dL 13.7   HEMATOCRIT % 39.8   PLATELETS 10*3/mm3 339     Results from last 7 days   Lab Units 10/06/22  1314 10/03/22  0953   SODIUM mmol/L 141 142   POTASSIUM mmol/L 3.2* 3.6   CHLORIDE mmol/L 98 102   CO2 mmol/L 29.9* 26.6   BUN mg/dL 13 11   CREATININE mg/dL 0.90 0.94   CALCIUM mg/dL 9.2 8.9   BILIRUBIN mg/dL 0.6 0.5   ALK PHOS U/L 79 83   ALT (SGPT) U/L 12 9   AST (SGOT) U/L 18 18   GLUCOSE mg/dL 106* 109*     Imaging Results (Last 24 Hours)     Procedure Component Value Units Date/Time    CT Abdomen Pelvis With Contrast [[id number]] Collected: 10/06/22 1638     Updated: 10/06/22 1646    Narrative:      CT ABDOMEN PELVIS W CONTRAST-     INDICATIONS: Pain     TECHNIQUE: Radiation dose reduction techniques were utilized, including  automated exposure control and exposure modulation based on body size.  Enhanced ABDOMEN AND PELVIS CT     COMPARISON: 9/15/2021     FINDINGS:     The gallbladder is surgically absent, with mild intrahepatic and  extrahepatic biliary ductal dilatation. Likely focal fat is seen  anteriorly at the left hepatic lobe..     No hydronephrosis is noted. Portions of the distal ureters and urinary  bladder are obscured by bilateral hip arthroplasty hardware artifact.     Otherwise unremarkable appearance of the liver, spleen, adrenal glands,  pancreas, kidneys, bladder.     No bowel obstruction. Appearance of mild wall thickening predominantly  the transverse and descending colon is likely at least in part from lack  of distention, but could be evidence of mild nonspecific  colitis.  Minimal hiatal hernia is apparent. Colonic diverticula are seen that do  not appear inflamed.     No free intraperitoneal gas or free fluid.     Scattered small mesenteric and para-aortic lymph nodes are seen that are  not significant by size criteria.     Abdominal aorta is not aneurysmal. Aortic and other arterial  calcifications are present.     The lung bases show mild atelectasis.     Degenerative and surgical changes are seen in the spine. No acute  fracture is identified.             Impression:            1. Appearance of mild wall thickening predominantly the transverse and  descending colon is likely at least in part from lack of distention, but  could be evidence of mild nonspecific colitis. Colonic diverticulosis.  2. No obstructive uropathy.     This report was finalized on 10/6/2022 4:43 PM by Dr. Serg Figueroa M.D.             I have reviewed the medications.  ---------------------------------------------------------------------------------------------  Assessment & Plan   Assessment/Problem List    Colitis      Plan:  Colitis  Consult to GI  IV Zosyn per GI recommendation  Stool for pcr panel and c diff     Hypokalemia  Electrolyte protocol ordered  Repeat BMP in AM     Hypertension  Monitor  Continue home meds    Disposition: admitted to medicine    This will serve as a transfer note.     Karine Smalls, APRN 10/07/22 04:57 EDT

## 2022-10-08 PROBLEM — I10 ESSENTIAL HYPERTENSION: Status: ACTIVE | Noted: 2017-02-13

## 2022-10-08 PROBLEM — K86.1 CHRONIC PANCREATITIS (HCC): Status: ACTIVE | Noted: 2022-10-08

## 2022-10-08 PROBLEM — R73.9 HYPERGLYCEMIA: Status: ACTIVE | Noted: 2022-10-08

## 2022-10-08 PROBLEM — R10.9 ABDOMINAL PAIN: Status: ACTIVE | Noted: 2022-10-08

## 2022-10-08 PROBLEM — E87.6 HYPOKALEMIA: Status: ACTIVE | Noted: 2022-10-08

## 2022-10-08 LAB
ADV 40+41 DNA STL QL NAA+NON-PROBE: NOT DETECTED
ASTRO TYP 1-8 RNA STL QL NAA+NON-PROBE: NOT DETECTED
C CAYETANENSIS DNA STL QL NAA+NON-PROBE: NOT DETECTED
C COLI+JEJ+UPSA DNA STL QL NAA+NON-PROBE: NOT DETECTED
C DIFF TOX GENS STL QL NAA+PROBE: NEGATIVE
CRYPTOSP DNA STL QL NAA+NON-PROBE: NOT DETECTED
E HISTOLYT DNA STL QL NAA+NON-PROBE: NOT DETECTED
EAEC PAA PLAS AGGR+AATA ST NAA+NON-PRB: NOT DETECTED
EC STX1+STX2 GENES STL QL NAA+NON-PROBE: NOT DETECTED
EPEC EAE GENE STL QL NAA+NON-PROBE: NOT DETECTED
ETEC LTA+ST1A+ST1B TOX ST NAA+NON-PROBE: NOT DETECTED
G LAMBLIA DNA STL QL NAA+NON-PROBE: NOT DETECTED
HBA1C MFR BLD: 5.3 % (ref 4.8–5.6)
NOROVIRUS GI+II RNA STL QL NAA+NON-PROBE: NOT DETECTED
P SHIGELLOIDES DNA STL QL NAA+NON-PROBE: NOT DETECTED
POTASSIUM SERPL-SCNC: 4.1 MMOL/L (ref 3.5–5.2)
RVA RNA STL QL NAA+NON-PROBE: NOT DETECTED
S ENT+BONG DNA STL QL NAA+NON-PROBE: NOT DETECTED
SAPO I+II+IV+V RNA STL QL NAA+NON-PROBE: NOT DETECTED
SHIGELLA SP+EIEC IPAH ST NAA+NON-PROBE: NOT DETECTED
V CHOL+PARA+VUL DNA STL QL NAA+NON-PROBE: NOT DETECTED
V CHOLERAE DNA STL QL NAA+NON-PROBE: NOT DETECTED
Y ENTEROCOL DNA STL QL NAA+NON-PROBE: NOT DETECTED

## 2022-10-08 PROCEDURE — 87507 IADNA-DNA/RNA PROBE TQ 12-25: CPT | Performed by: NURSE PRACTITIONER

## 2022-10-08 PROCEDURE — G0378 HOSPITAL OBSERVATION PER HR: HCPCS

## 2022-10-08 PROCEDURE — 96372 THER/PROPH/DIAG INJ SC/IM: CPT

## 2022-10-08 PROCEDURE — 83036 HEMOGLOBIN GLYCOSYLATED A1C: CPT | Performed by: INTERNAL MEDICINE

## 2022-10-08 PROCEDURE — 84132 ASSAY OF SERUM POTASSIUM: CPT | Performed by: INTERNAL MEDICINE

## 2022-10-08 PROCEDURE — 25010000002 SODIUM CHLORIDE 0.9 % WITH KCL 20 MEQ 20-0.9 MEQ/L-% SOLUTION: Performed by: INTERNAL MEDICINE

## 2022-10-08 PROCEDURE — 87493 C DIFF AMPLIFIED PROBE: CPT | Performed by: NURSE PRACTITIONER

## 2022-10-08 PROCEDURE — 96361 HYDRATE IV INFUSION ADD-ON: CPT

## 2022-10-08 PROCEDURE — 86364 TISS TRNSGLTMNASE EA IG CLAS: CPT | Performed by: INTERNAL MEDICINE

## 2022-10-08 PROCEDURE — 25010000002 ENOXAPARIN PER 10 MG: Performed by: INTERNAL MEDICINE

## 2022-10-08 PROCEDURE — 86258 DGP ANTIBODY EACH IG CLASS: CPT | Performed by: INTERNAL MEDICINE

## 2022-10-08 PROCEDURE — 82784 ASSAY IGA/IGD/IGG/IGM EACH: CPT | Performed by: INTERNAL MEDICINE

## 2022-10-08 PROCEDURE — 99214 OFFICE O/P EST MOD 30 MIN: CPT | Performed by: INTERNAL MEDICINE

## 2022-10-08 PROCEDURE — 86231 EMA EACH IG CLASS: CPT | Performed by: INTERNAL MEDICINE

## 2022-10-08 RX ORDER — PEG-3350, SODIUM SULFATE, SODIUM CHLORIDE, POTASSIUM CHLORIDE, SODIUM ASCORBATE AND ASCORBIC ACID 7.5-2.691G
1000 KIT ORAL ONCE
Status: DISCONTINUED | OUTPATIENT
Start: 2022-10-08 | End: 2022-10-08 | Stop reason: CLARIF

## 2022-10-08 RX ORDER — ENOXAPARIN SODIUM 100 MG/ML
40 INJECTION SUBCUTANEOUS EVERY 24 HOURS
Status: DISCONTINUED | OUTPATIENT
Start: 2022-10-08 | End: 2022-10-09 | Stop reason: HOSPADM

## 2022-10-08 RX ORDER — PEG-3350, SODIUM SULFATE, SODIUM CHLORIDE, POTASSIUM CHLORIDE, SODIUM ASCORBATE AND ASCORBIC ACID 7.5-2.691G
1000 KIT ORAL ONCE
Status: DISCONTINUED | OUTPATIENT
Start: 2022-10-09 | End: 2022-10-08 | Stop reason: CLARIF

## 2022-10-08 RX ADMIN — PREGABALIN 300 MG: 100 CAPSULE ORAL at 16:29

## 2022-10-08 RX ADMIN — HYDROCODONE BITARTRATE AND ACETAMINOPHEN 1 TABLET: 5; 325 TABLET ORAL at 14:10

## 2022-10-08 RX ADMIN — POTASSIUM CHLORIDE AND SODIUM CHLORIDE 100 ML/HR: 900; 150 INJECTION, SOLUTION INTRAVENOUS at 20:26

## 2022-10-08 RX ADMIN — LISINOPRIL 20 MG: 20 TABLET ORAL at 09:30

## 2022-10-08 RX ADMIN — PANTOPRAZOLE SODIUM 40 MG: 40 TABLET, DELAYED RELEASE ORAL at 06:48

## 2022-10-08 RX ADMIN — PREGABALIN 150 MG: 75 CAPSULE ORAL at 06:48

## 2022-10-08 RX ADMIN — OLANZAPINE 2.5 MG: 2.5 TABLET ORAL at 20:25

## 2022-10-08 RX ADMIN — OXYBUTYNIN CHLORIDE 10 MG: 10 TABLET, EXTENDED RELEASE ORAL at 12:18

## 2022-10-08 RX ADMIN — Medication 100 MG: at 12:18

## 2022-10-08 RX ADMIN — Medication 10 ML: at 20:00

## 2022-10-08 RX ADMIN — POTASSIUM CHLORIDE AND SODIUM CHLORIDE 100 ML/HR: 900; 150 INJECTION, SOLUTION INTRAVENOUS at 06:45

## 2022-10-08 RX ADMIN — Medication 10 ML: at 09:31

## 2022-10-08 RX ADMIN — HYDROCODONE BITARTRATE AND ACETAMINOPHEN 1 TABLET: 5; 325 TABLET ORAL at 20:25

## 2022-10-08 RX ADMIN — ACETAMINOPHEN 650 MG: 325 TABLET, FILM COATED ORAL at 19:47

## 2022-10-08 RX ADMIN — LISINOPRIL 20 MG: 20 TABLET ORAL at 20:25

## 2022-10-08 RX ADMIN — VILAZODONE HYDROCHLORIDE 40 MG: 40 TABLET, FILM COATED ORAL at 12:18

## 2022-10-08 RX ADMIN — POTASSIUM CHLORIDE AND SODIUM CHLORIDE 100 ML/HR: 900; 150 INJECTION, SOLUTION INTRAVENOUS at 16:25

## 2022-10-08 RX ADMIN — POTASSIUM CHLORIDE 40 MEQ: 750 TABLET, EXTENDED RELEASE ORAL at 06:45

## 2022-10-08 RX ADMIN — ENOXAPARIN SODIUM 40 MG: 100 INJECTION SUBCUTANEOUS at 14:10

## 2022-10-08 NOTE — PLAN OF CARE
Goal Outcome Evaluation:  Plan of Care Reviewed With: patient        Progress: no change   A&Ox4. RA. Assist x1 to bathroom. NSR on monitor. Regular diet but patient has stuck to clear liquids as personal choice due to scope for tomorrow. Currently no orders for clears or bowel prep. Stool sent. Cdiff negative. Continuous fluids. Patient c/o pain; pain medication given; interventions successful per patient. No other complaints. All needs met. WCTM.

## 2022-10-08 NOTE — PLAN OF CARE
Goal Outcome Evaluation:  Plan of Care Reviewed With: patient        Progress: no change  Outcome Evaluation: Patient presents with no symptoms of n/v/d. IVFs started. K+ replaced. VSS. Patient appeared asleep most of the night.

## 2022-10-08 NOTE — PROGRESS NOTES
[facility]  Inpatient Progress Note    Reason for Follow Up: Nausea and vomiting and abnormal imaging  Subjective     Interval History:   No nausea and vomiting this morning, she would like the scopes to be done as an inpatient    Current Facility-Administered Medications:   •  acetaminophen (TYLENOL) tablet 650 mg, 650 mg, Oral, Q6H PRN, Karine Smalls, APRN, 650 mg at 10/07/22 0226  •  cetirizine (zyrTEC) tablet 10 mg, 10 mg, Oral, Daily PRN, Hilary Whiting, APRN, 10 mg at 10/07/22 1000  •  dicyclomine (BENTYL) injection 20 mg, 20 mg, Intramuscular, 4x Daily PRN, Karine Smalls APRN  •  droperidol (INAPSINE) injection 1.25 mg, 1.25 mg, Intravenous, Once, Karine Smalls, APRJAMES  •  HYDROcodone-acetaminophen (NORCO) 5-325 MG per tablet 1 tablet, 1 tablet, Oral, Q4H PRN, Hilary Whiting, APRN, 1 tablet at 10/07/22 2138  •  lisinopril (PRINIVIL,ZESTRIL) tablet 20 mg, 20 mg, Oral, BID, Hilary Whiting, APRN, 20 mg at 10/08/22 0930  •  nitroglycerin (NITROSTAT) SL tablet 0.4 mg, 0.4 mg, Sublingual, Q5 Min PRN, Hilary Whiting, APRN  •  OLANZapine (zyPREXA) tablet 2.5 mg, 2.5 mg, Oral, Nightly, Hilary Whiting, APRN, 2.5 mg at 10/07/22 2258  •  ondansetron (ZOFRAN) tablet 4 mg, 4 mg, Oral, Q6H PRN **OR** ondansetron (ZOFRAN) injection 4 mg, 4 mg, Intravenous, Q6H PRN, Hilary Whiting, APRN, 4 mg at 10/07/22 3862  •  ondansetron (ZOFRAN) tablet 8 mg, 8 mg, Oral, Q8H PRN, Hilary Whiting, APRN  •  oxybutynin XL (DITROPAN-XL) 24 hr tablet 10 mg, 10 mg, Oral, Daily, Hilary Whiting, APRN, 10 mg at 10/07/22 1000  •  pantoprazole (PROTONIX) EC tablet 40 mg, 40 mg, Oral, QAM, Hilary Whiting, APRN, 40 mg at 10/08/22 0648  •  potassium chloride (K-DUR,KLOR-CON) ER tablet 40 mEq, 40 mEq, Oral, PRN, 40 mEq at 10/08/22 0645 **OR** potassium chloride (KLOR-CON) packet 40 mEq, 40 mEq, Oral, PRN **OR** potassium chloride 10 mEq in 100 mL IVPB, 10 mEq, Intravenous, Q1H PRN, Karine Smalls, APRN  •  pregabalin (LYRICA) capsule 150 mg, 150  mg, Oral, QAM, Herth, Hilary, APRN, 150 mg at 10/08/22 0648  •  pregabalin (LYRICA) capsule 300 mg, 300 mg, Oral, Q PM, Herth, Hilary, APRN, 300 mg at 10/07/22 1631  •  sodium chloride 0.9 % flush 10 mL, 10 mL, Intravenous, PRN, Chandan Salazar MD  •  sodium chloride 0.9 % flush 10 mL, 10 mL, Intravenous, Q12H, Herth, Hilary, APRN, 10 mL at 10/08/22 0931  •  sodium chloride 0.9 % flush 10 mL, 10 mL, Intravenous, PRN, Herth, Hilary, APRN  •  sodium chloride 0.9 % with KCl 20 mEq/L infusion, 100 mL/hr, Intravenous, Continuous, Stingl, Noris Garner MD, Last Rate: 100 mL/hr at 10/08/22 0800, 100 mL/hr at 10/08/22 0800  •  vilazodone (VIIBRYD) tablet 40 mg, 40 mg, Oral, Daily, Herth, Hilary, APRN, 40 mg at 10/07/22 1000  •  Vitamin B-2 (RIBOFLAVIN) tablet 100 mg, 100 mg, Oral, Daily, [facility], Hilary, APRN, 100 mg at 10/07/22 0959  Review of Systems:    There is weakness of fatigue all other systems reviewed and    Objective     Vital Signs  Temp:  [98 °F (36.7 °C)-98.5 °F (36.9 °C)] 98.2 °F (36.8 °C)  Heart Rate:  [58-80] 62  Resp:  [16] 16  BP: (122-145)/(74-81) 145/80  Body mass index is 35.23 kg/m².    Intake/Output Summary (Last 24 hours) at 10/8/2022 1050  Last data filed at 10/8/2022 0932  Gross per 24 hour   Intake 1520 ml   Output --   Net 1520 ml     I/O this shift:  In: 360 [P.O.:360]  Out: -      Physical Exam:   General: patient awake, alert and cooperative   Eyes: Normal lids and lashes, no scleral icterus   Neck: supple, normal ROM   Skin: warm and dry, not jaundiced   Cardiovascular: regular rhythm and rate, no murmurs auscultated   Pulm: clear to auscultation bilaterally, regular and unlabored   Abdomen: soft, nontender, nondistended; normal bowel sounds   Extremities: no rash or edema   Psychiatric: Normal mood and behavior; memory intact     Results Review:     I reviewed the patient's new clinical results.    Results from last 7 days   Lab Units 10/07/22  0355 10/06/22  1314 10/03/22  0953   WBC 10*3/mm3  6.81 7.02 5.95   HEMOGLOBIN g/dL 12.1 13.7 12.4   HEMATOCRIT % 35.4 39.8 38.8   PLATELETS 10*3/mm3 280 339 293     Results from last 7 days   Lab Units 10/07/22  1408 10/07/22  0355 10/06/22  1314 10/03/22  0953   SODIUM mmol/L 140 143 141 142   POTASSIUM mmol/L 2.9* 2.8* 3.2* 3.6   CHLORIDE mmol/L 101 103 98 102   CO2 mmol/L 28.3 28.8 29.9* 26.6   BUN mg/dL 12 12 13 11   CREATININE mg/dL 0.91 0.92 0.90 0.94   CALCIUM mg/dL 8.8 8.6 9.2 8.9   BILIRUBIN mg/dL  --  0.6 0.6 0.5   ALK PHOS U/L  --  72 79 83   ALT (SGPT) U/L  --  13 12 9   AST (SGOT) U/L  --  20 18 18   GLUCOSE mg/dL 126* 96 106* 109*         Lab Results   Lab Value Date/Time    LIPASE 36 10/06/2022 1314    LIPASE 15 09/12/2021 1320    LIPASE 16 02/14/2020 2046    LIPASE 85 06/06/2018 1001       Radiology:  CT Abdomen Pelvis With Contrast   Final Result           1. Appearance of mild wall thickening predominantly the transverse and   descending colon is likely at least in part from lack of distention, but   could be evidence of mild nonspecific colitis. Colonic diverticulosis.   2. No obstructive uropathy.       This report was finalized on 10/6/2022 4:43 PM by Dr. Serg Figueroa M.D.              Assessment & Plan     Patient Active Problem List   Diagnosis   • Osteoarthrosis, localized, primary, shoulder region   • Hypertension   • Depressed   • Status post total replacement of left shoulder   • Duodenitis   • Abnormal CT scan, small bowel   • Nausea and vomiting   • SIN (obstructive sleep apnea)   • Snoring   • Hypersomnia   • Non-restorative sleep   • Obesity (BMI 30-39.9)   • Poor memory   • Frequent falls   • Migraine headache   • Altered mental status   • Dehydration   • Encephalopathy, toxic   • Polypharmacy   • Chronic prescription opiate use   • Urinary incontinence   • Colitis     memory    • Frequent falls   • Migraine headache   • Altered mental status   • Dehydration   • Encephalopathy, toxic   • Polypharmacy   • Chronic prescription  opiate use   • Urinary incontinence   • Colitis         Assessment:  1. Nausea vomiting abdominal pain in the setting of the patient with history of mild sigmoid inflammation on colonoscopy 2018 and mild erosion on EGD 2020.  CT scan with possible thickening in the colon versus under distention  2. Hypokalemia  3. Mild abdominal pain, LLQ     Plan:  • Treatment is supportive currently  • Replete electrolytes today  • Antiemetics and hydration  • She has chosen bidirectional endoscopy as an inpatient I will prep her tonight  • Scopes tomorrow  • Please check labs in the morning if her electrolytes are abnormal please correct them or her scopes will not get performed thank you       I discussed the patients findings and my recommendations with patient and nursing staff.    Kenneth Sanches MD

## 2022-10-08 NOTE — PROGRESS NOTES
Name: Jaylyn Fuentes ADMIT: 10/6/2022   : 1950  PCP: Annie Romero MD    MRN: [id number] LOS: 0 days   AGE/SEX: 72 y.o. female  ROOM: [facility]     Subjective   Subjective   Patient reports resolution of the nausea and vomiting and abdominal pain.  Continues with diarrhea.  No fresh bright blood per rectum or melena.  No fever or chills.  Tolerating clear liquids well.      Review of Systems  Cardio vascular/respiratory.  No chest pain/no shortness of breath/no cough/no palpitation  .  No dysuria or hematuria.  CNS.  No loss of consciousness or dizziness.  No focal neurological symptoms.     Objective   Objective   Vital Signs  Temp:  [98.2 °F (36.8 °C)-98.5 °F (36.9 °C)] 98.2 °F (36.8 °C)  Heart Rate:  [58-80] 73  Resp:  [16-18] 18  BP: (122-158)/(74-88) 158/88  SpO2:  [94 %-97 %] 97 %  on   ;   Device (Oxygen Therapy): room air    Intake/Output Summary (Last 24 hours) at 10/8/2022 1302  Last data filed at 10/8/2022 1143  Gross per 24 hour   Intake 2000 ml   Output --   Net 2000 ml     Body mass index is 35.23 kg/m².      10/06/22  1709 10/06/22  1826 10/07/22  2002   Weight: 93.9 kg (207 lb) 93.9 kg (207 lb) 90.2 kg (198 lb 14.4 oz)     Physical Exam  General.  Elderly female.  She is obese.  Alert and oriented x3.  In no apparent pain/distress/diaphoresis.  Normal mood and affect.  Eyes.  Pupils equal round and reactive.  Intact extraocular musculature.  No pallor or jaundice.  Oral cavity.  Moist mucous membrane.  Neck.  Supple.  No JVD.  No lymphadenopathy or thyromegaly.  Cardiovascular.  Regular rate and rhythm with no gallops or murmurs.  Chest.  Clear to auscultation bilaterally with scattered bilateral rhonchi  Abdomen.  Obese.  Soft lax.  No tenderness.  No organomegaly.  No guarding or rebound.  Extremities.  No clubbing/cyanosis/edema.  CNS.  No acute focal neurological deficits.      Results Review:      Results from last 7 days   Lab Units 10/07/22  1408 10/07/22  7754  10/06/22  1314 10/03/22  0953   SODIUM mmol/L 140 143 141 142   POTASSIUM mmol/L 2.9* 2.8* 3.2* 3.6   CHLORIDE mmol/L 101 103 98 102   CO2 mmol/L 28.3 28.8 29.9* 26.6   BUN mg/dL 12 12 13 11   CREATININE mg/dL 0.91 0.92 0.90 0.94   GLUCOSE mg/dL 126* 96 106* 109*   CALCIUM mg/dL 8.8 8.6 9.2 8.9   AST (SGOT) U/L  --  20 18 18   ALT (SGPT) U/L  --  13 12 9     Estimated Creatinine Clearance: 58.7 mL/min (by C-G formula based on SCr of 0.91 mg/dL).      Results from last 7 days   Lab Units 10/07/22  2007   GLUCOSE mg/dL 158*                 Results from last 7 days   Lab Units 10/07/22  0355   MAGNESIUM mg/dL 2.0           Invalid input(s): LDLCALC  Results from last 7 days   Lab Units 10/07/22  0355 10/06/22  1314 10/03/22  0953   WBC 10*3/mm3 6.81 7.02 5.95   HEMOGLOBIN g/dL 12.1 13.7 12.4   HEMATOCRIT % 35.4 39.8 38.8   PLATELETS 10*3/mm3 280 339 293   MCV fL 85.5 86.0 88.4   MCH pg 29.2 29.6 28.2   MCHC g/dL 34.2 34.4 32.0   RDW % 15.2 15.5* 15.7*   RDW-SD fl 47.3 47.9 51.0   MPV fL 9.2 8.8 9.0   NEUTROPHIL % %  --  64.9 78.2*   LYMPHOCYTE % %  --  24.2 14.5*   MONOCYTES % %  --  9.3 5.7   EOSINOPHIL % %  --  0.7 0.8   BASOPHIL % %  --  0.6 0.5   IMM GRAN % %  --  0.3 0.3   NEUTROS ABS 10*3/mm3  --  4.56 4.65   LYMPHS ABS 10*3/mm3  --  1.70 0.86   MONOS ABS 10*3/mm3  --  0.65 0.34   EOS ABS 10*3/mm3  --  0.05 0.05   BASOS ABS 10*3/mm3  --  0.04 0.03   IMMATURE GRANS (ABS) 10*3/mm3  --  0.02 0.02   NRBC /100 WBC  --  0.0 0.0             Results from last 7 days   Lab Units 10/06/22  1314   PROCALCITONIN ng/mL 0.04   LACTATE mmol/L 1.1         Results from last 7 days   Lab Units 10/06/22  1314   LIPASE U/L 36     Results from last 7 days   Lab Units 10/07/22  0355   BLOODCX  No growth at 24 hours         Results from last 7 days   Lab Units 10/06/22  1653   NITRITE UA  Negative   WBC UA /HPF 3-5*   BACTERIA UA /HPF None Seen   SQUAM EPITHEL UA /HPF 0-2           Imaging:  Imaging Results (Last 24 Hours)     ** No  results found for the last 24 hours. **             I reviewed the patient's new clinical results / labs / tests / procedures      Assessment/Plan     Active Hospital Problems    Diagnosis  POA   • **Colitis [K52.9]  Yes   • Abdominal pain [R10.9]  Yes   • Hypokalemia [E87.6]  Yes   • Chronic pancreatitis (HCC) [K86.1]  Yes   • Hyperglycemia [R73.9]  Yes   • N&V (nausea and vomiting) [R11.2]  Yes   • Essential hypertension [I10]  Yes      Resolved Hospital Problems   No resolved problems to display.           · Abdominal pain/nausea/vomiting secondary to possible colitis in a patient with a history of chronic pancreatitis/s/p cholecystectomy/GERD/hiatal hernia/IBS diarrhea/lactose intolerance.  Tolerating liquid diet well.  Benign GI examination.  Stool for C. difficile and PCR are pending.  Lipase, lactic acid, procalcitonin, white blood cell count, liver function test are normal.  GI recommends continuation of supportive care and bidirectional endoscopy tomorrow.  Noted if endoscopies are -1 should consider chronic pancreatitis/postcholecystectomy/celiac disease.  I will check endomysial antibody and tissue transglutaminase IgA.  Might benefit from cholestyramine for postcholecystectomy diarrhea or from pancreatic enzymes if deemed appropriate.  · Hypokalemia.  Mostly secondary to diarrhea.  Magnesium is normal.  Will substitute.  · Depression.  Continue Zyprexa and Viibryd.  Clinically stable.  · Hypertension.  Good blood pressure control with no evidence of angina or congestive heart failure.  Continue lisinopril.  · Hyperglycemia.  Will check A1c.  · VTE prophylaxis.  Initiate Lovenox.    Discussed my findings and plan of treatment with the patient  Disposition.  Home in 1 to 2 days depending on the results of the bidirectional endoscopy and diet advancement        Elsa Heath MD  [facility]  10/08/22  13:02 EDT

## 2022-10-09 ENCOUNTER — ANESTHESIA (OUTPATIENT)
Dept: GASTROENTEROLOGY | Facility: HOSPITAL | Age: 72
End: 2022-10-09

## 2022-10-09 ENCOUNTER — ANESTHESIA EVENT (OUTPATIENT)
Dept: GASTROENTEROLOGY | Facility: HOSPITAL | Age: 72
End: 2022-10-09

## 2022-10-09 VITALS
WEIGHT: 198.9 LBS | HEIGHT: 63 IN | TEMPERATURE: 98 F | HEART RATE: 63 BPM | RESPIRATION RATE: 13 BRPM | OXYGEN SATURATION: 100 % | SYSTOLIC BLOOD PRESSURE: 168 MMHG | DIASTOLIC BLOOD PRESSURE: 91 MMHG | BODY MASS INDEX: 35.24 KG/M2

## 2022-10-09 LAB
ALBUMIN SERPL-MCNC: 4 G/DL (ref 3.5–5.2)
ALBUMIN/GLOB SERPL: 1.4 G/DL
ALP SERPL-CCNC: 77 U/L (ref 39–117)
ALT SERPL W P-5'-P-CCNC: 19 U/L (ref 1–33)
ANION GAP SERPL CALCULATED.3IONS-SCNC: 10.7 MMOL/L (ref 5–15)
AST SERPL-CCNC: 20 U/L (ref 1–32)
BASOPHILS # BLD AUTO: 0.05 10*3/MM3 (ref 0–0.2)
BASOPHILS NFR BLD AUTO: 0.7 % (ref 0–1.5)
BILIRUB SERPL-MCNC: 0.6 MG/DL (ref 0–1.2)
BUN SERPL-MCNC: 7 MG/DL (ref 8–23)
BUN/CREAT SERPL: 7.3 (ref 7–25)
CALCIUM SPEC-SCNC: 9.1 MG/DL (ref 8.6–10.5)
CHLORIDE SERPL-SCNC: 108 MMOL/L (ref 98–107)
CO2 SERPL-SCNC: 25.3 MMOL/L (ref 22–29)
CREAT SERPL-MCNC: 0.96 MG/DL (ref 0.57–1)
DEPRECATED RDW RBC AUTO: 48.3 FL (ref 37–54)
EGFRCR SERPLBLD CKD-EPI 2021: 62.6 ML/MIN/1.73
EOSINOPHIL # BLD AUTO: 0.21 10*3/MM3 (ref 0–0.4)
EOSINOPHIL NFR BLD AUTO: 3.1 % (ref 0.3–6.2)
ERYTHROCYTE [DISTWIDTH] IN BLOOD BY AUTOMATED COUNT: 15.7 % (ref 12.3–15.4)
GLOBULIN UR ELPH-MCNC: 2.8 GM/DL
GLUCOSE SERPL-MCNC: 87 MG/DL (ref 65–99)
HCT VFR BLD AUTO: 38.2 % (ref 34–46.6)
HGB BLD-MCNC: 13.1 G/DL (ref 12–15.9)
IMM GRANULOCYTES # BLD AUTO: 0.03 10*3/MM3 (ref 0–0.05)
IMM GRANULOCYTES NFR BLD AUTO: 0.4 % (ref 0–0.5)
LYMPHOCYTES # BLD AUTO: 2.27 10*3/MM3 (ref 0.7–3.1)
LYMPHOCYTES NFR BLD AUTO: 33.8 % (ref 19.6–45.3)
MCH RBC QN AUTO: 29.3 PG (ref 26.6–33)
MCHC RBC AUTO-ENTMCNC: 34.3 G/DL (ref 31.5–35.7)
MCV RBC AUTO: 85.5 FL (ref 79–97)
MONOCYTES # BLD AUTO: 0.56 10*3/MM3 (ref 0.1–0.9)
MONOCYTES NFR BLD AUTO: 8.3 % (ref 5–12)
NEUTROPHILS NFR BLD AUTO: 3.59 10*3/MM3 (ref 1.7–7)
NEUTROPHILS NFR BLD AUTO: 53.7 % (ref 42.7–76)
NRBC BLD AUTO-RTO: 0 /100 WBC (ref 0–0.2)
PLATELET # BLD AUTO: 340 10*3/MM3 (ref 140–450)
PMV BLD AUTO: 9.6 FL (ref 6–12)
POTASSIUM SERPL-SCNC: 4.1 MMOL/L (ref 3.5–5.2)
PROT SERPL-MCNC: 6.8 G/DL (ref 6–8.5)
RBC # BLD AUTO: 4.47 10*6/MM3 (ref 3.77–5.28)
SODIUM SERPL-SCNC: 144 MMOL/L (ref 136–145)
WBC NRBC COR # BLD: 6.71 10*3/MM3 (ref 3.4–10.8)

## 2022-10-09 PROCEDURE — A9270 NON-COVERED ITEM OR SERVICE: HCPCS | Performed by: STUDENT IN AN ORGANIZED HEALTH CARE EDUCATION/TRAINING PROGRAM

## 2022-10-09 PROCEDURE — 63710000001 OXYBUTYNIN XL 10 MG TABLET SUSTAINED-RELEASE 24 HOUR: Performed by: NURSE PRACTITIONER

## 2022-10-09 PROCEDURE — A9270 NON-COVERED ITEM OR SERVICE: HCPCS | Performed by: NURSE PRACTITIONER

## 2022-10-09 PROCEDURE — G0378 HOSPITAL OBSERVATION PER HR: HCPCS

## 2022-10-09 PROCEDURE — 80053 COMPREHEN METABOLIC PANEL: CPT | Performed by: INTERNAL MEDICINE

## 2022-10-09 PROCEDURE — 25010000002 SODIUM CHLORIDE 0.9 % WITH KCL 20 MEQ 20-0.9 MEQ/L-% SOLUTION: Performed by: INTERNAL MEDICINE

## 2022-10-09 PROCEDURE — 63710000001 VILAZODONE 40 MG TABLET: Performed by: NURSE PRACTITIONER

## 2022-10-09 PROCEDURE — 63710000001 HYDROCODONE-ACETAMINOPHEN 5-325 MG TABLET: Performed by: NURSE PRACTITIONER

## 2022-10-09 PROCEDURE — 96361 HYDRATE IV INFUSION ADD-ON: CPT

## 2022-10-09 PROCEDURE — 63710000001 DIPHENOXYLATE-ATROPINE 2.5-0.025 MG TABLET: Performed by: STUDENT IN AN ORGANIZED HEALTH CARE EDUCATION/TRAINING PROGRAM

## 2022-10-09 PROCEDURE — 25010000002 ENOXAPARIN PER 10 MG: Performed by: INTERNAL MEDICINE

## 2022-10-09 PROCEDURE — 45385 COLONOSCOPY W/LESION REMOVAL: CPT | Performed by: INTERNAL MEDICINE

## 2022-10-09 PROCEDURE — 85007 BL SMEAR W/DIFF WBC COUNT: CPT | Performed by: INTERNAL MEDICINE

## 2022-10-09 PROCEDURE — 43239 EGD BIOPSY SINGLE/MULTIPLE: CPT | Performed by: INTERNAL MEDICINE

## 2022-10-09 PROCEDURE — 25010000002 PROPOFOL 10 MG/ML EMULSION: Performed by: ANESTHESIOLOGY

## 2022-10-09 PROCEDURE — 63710000001: Performed by: NURSE PRACTITIONER

## 2022-10-09 PROCEDURE — 63710000001 LISINOPRIL 20 MG TABLET: Performed by: NURSE PRACTITIONER

## 2022-10-09 PROCEDURE — 96372 THER/PROPH/DIAG INJ SC/IM: CPT

## 2022-10-09 PROCEDURE — 88305 TISSUE EXAM BY PATHOLOGIST: CPT | Performed by: INTERNAL MEDICINE

## 2022-10-09 PROCEDURE — 85025 COMPLETE CBC W/AUTO DIFF WBC: CPT | Performed by: INTERNAL MEDICINE

## 2022-10-09 RX ORDER — LIDOCAINE HYDROCHLORIDE 20 MG/ML
INJECTION, SOLUTION INFILTRATION; PERINEURAL AS NEEDED
Status: DISCONTINUED | OUTPATIENT
Start: 2022-10-09 | End: 2022-10-09 | Stop reason: SURG

## 2022-10-09 RX ORDER — SODIUM CHLORIDE, SODIUM LACTATE, POTASSIUM CHLORIDE, CALCIUM CHLORIDE 600; 310; 30; 20 MG/100ML; MG/100ML; MG/100ML; MG/100ML
INJECTION, SOLUTION INTRAVENOUS CONTINUOUS PRN
Status: DISCONTINUED | OUTPATIENT
Start: 2022-10-09 | End: 2022-10-09 | Stop reason: SURG

## 2022-10-09 RX ORDER — SODIUM CHLORIDE 9 MG/ML
30 INJECTION, SOLUTION INTRAVENOUS CONTINUOUS PRN
Status: DISCONTINUED | OUTPATIENT
Start: 2022-10-09 | End: 2022-10-09 | Stop reason: HOSPADM

## 2022-10-09 RX ORDER — PANTOPRAZOLE SODIUM 40 MG/1
40 TABLET, DELAYED RELEASE ORAL
Status: DISCONTINUED | OUTPATIENT
Start: 2022-10-09 | End: 2022-10-09 | Stop reason: HOSPADM

## 2022-10-09 RX ORDER — PANTOPRAZOLE SODIUM 40 MG/1
40 TABLET, DELAYED RELEASE ORAL
Qty: 60 TABLET | Refills: 0 | Status: SHIPPED | OUTPATIENT
Start: 2022-10-09 | End: 2022-11-08

## 2022-10-09 RX ORDER — DIPHENOXYLATE HYDROCHLORIDE AND ATROPINE SULFATE 2.5; .025 MG/1; MG/1
2 TABLET ORAL DAILY
Status: DISCONTINUED | OUTPATIENT
Start: 2022-10-09 | End: 2022-10-09 | Stop reason: HOSPADM

## 2022-10-09 RX ORDER — PROPOFOL 10 MG/ML
VIAL (ML) INTRAVENOUS AS NEEDED
Status: DISCONTINUED | OUTPATIENT
Start: 2022-10-09 | End: 2022-10-09 | Stop reason: SURG

## 2022-10-09 RX ORDER — DIPHENOXYLATE HYDROCHLORIDE AND ATROPINE SULFATE 2.5; .025 MG/1; MG/1
1 TABLET ORAL DAILY
Status: DISCONTINUED | OUTPATIENT
Start: 2022-10-09 | End: 2022-10-09

## 2022-10-09 RX ORDER — PROPOFOL 10 MG/ML
VIAL (ML) INTRAVENOUS CONTINUOUS PRN
Status: DISCONTINUED | OUTPATIENT
Start: 2022-10-09 | End: 2022-10-09 | Stop reason: SURG

## 2022-10-09 RX ADMIN — SODIUM CHLORIDE, POTASSIUM CHLORIDE, SODIUM LACTATE AND CALCIUM CHLORIDE: 600; 310; 30; 20 INJECTION, SOLUTION INTRAVENOUS at 09:24

## 2022-10-09 RX ADMIN — HYDROCODONE BITARTRATE AND ACETAMINOPHEN 1 TABLET: 5; 325 TABLET ORAL at 11:13

## 2022-10-09 RX ADMIN — LIDOCAINE HYDROCHLORIDE 100 MG: 20 INJECTION, SOLUTION INFILTRATION; PERINEURAL at 09:33

## 2022-10-09 RX ADMIN — POLYETHYLENE GLYCOL 3350, SODIUM SULFATE ANHYDROUS, SODIUM BICARBONATE, SODIUM CHLORIDE, POTASSIUM CHLORIDE 2000 ML: 236; 22.74; 6.74; 5.86; 2.97 POWDER, FOR SOLUTION ORAL at 04:58

## 2022-10-09 RX ADMIN — Medication 100 MG: at 11:13

## 2022-10-09 RX ADMIN — OXYBUTYNIN CHLORIDE 10 MG: 10 TABLET, EXTENDED RELEASE ORAL at 11:13

## 2022-10-09 RX ADMIN — ENOXAPARIN SODIUM 40 MG: 100 INJECTION SUBCUTANEOUS at 13:02

## 2022-10-09 RX ADMIN — Medication 100 MCG/KG/MIN: at 09:30

## 2022-10-09 RX ADMIN — DIPHENOXYLATE HYDROCHLORIDE AND ATROPINE SULFATE 2 TABLET: 2.5; .025 TABLET ORAL at 13:01

## 2022-10-09 RX ADMIN — POTASSIUM CHLORIDE AND SODIUM CHLORIDE 100 ML/HR: 900; 150 INJECTION, SOLUTION INTRAVENOUS at 01:06

## 2022-10-09 RX ADMIN — POLYETHYLENE GLYCOL 3350, SODIUM SULFATE ANHYDROUS, SODIUM BICARBONATE, SODIUM CHLORIDE, POTASSIUM CHLORIDE 2000 ML: 236; 22.74; 6.74; 5.86; 2.97 POWDER, FOR SOLUTION ORAL at 00:45

## 2022-10-09 RX ADMIN — Medication 10 ML: at 11:15

## 2022-10-09 RX ADMIN — LISINOPRIL 20 MG: 20 TABLET ORAL at 11:13

## 2022-10-09 RX ADMIN — VILAZODONE HYDROCHLORIDE 40 MG: 40 TABLET, FILM COATED ORAL at 11:13

## 2022-10-09 RX ADMIN — PROPOFOL 100 MG: 10 INJECTION, EMULSION INTRAVENOUS at 09:30

## 2022-10-09 NOTE — PLAN OF CARE
Goal Outcome Evaluation:  Plan of Care Reviewed With: patient        Progress: improving  Outcome Evaluation: Patient denies N/V/D. IVF fluids continue. K+ rechecked WNL. GI on call notified of patient orders not complete for Bidirectional endoscopy. New orders received. GoLytley taken PO by patient with large loose stool return. VSS.

## 2022-10-09 NOTE — ANESTHESIA PREPROCEDURE EVALUATION
Anesthesia Evaluation     Patient summary reviewed and Nursing notes reviewed   no history of anesthetic complications:  NPO Solid Status: > 6 hours  NPO Liquid Status: > 6 hours           Airway   Mallampati: II  TM distance: >3 FB  Neck ROM: full  no difficulty expected and No difficulty expected  Dental - normal exam     Pulmonary - normal exam    breath sounds clear to auscultation  (+) sleep apnea,   (-) rhonchi, decreased breath sounds, wheezes, rales, stridor  Cardiovascular - normal exam    NYHA Classification: I  ECG reviewed  Rhythm: regular  Rate: normal    (+) hypertension, hyperlipidemia,   (-) murmur, weak pulses, friction rub, systolic click, carotid bruits, JVD, peripheral edema      Neuro/Psych  (+) headaches, psychiatric history Depression,    GI/Hepatic/Renal/Endo    (+) obesity,  hiatal hernia, GERD,      Musculoskeletal     Abdominal  - normal exam    Abdomen: soft.   Substance History   (+) alcohol use,      OB/GYN negative ob/gyn ROS         Other   arthritis,                        Anesthesia Plan    ASA 3     MAC     intravenous induction     Anesthetic plan, risks, benefits, and alternatives have been provided, discussed and informed consent has been obtained with: patient.

## 2022-10-09 NOTE — PLAN OF CARE
Goal Outcome Evaluation:  Plan of Care Reviewed With: patient        Progress: improving   A&Ox4. RA. NS on monitor. Standby assist. Scope today. Diet advanced to regular cardiac diet. Patient c/o pain; medication given. Interventions successful per patient. No other complaints at this time. VSS. All needs met. Patient able to discharge if patient can tolerate diet.     1710- Patient's partner has been updated on requirements for discharge. Waiting for patient to eat regular meal to discharge; if patient does not have any issues after eating. Discharge paperwork complete and in chart.     1830- Patient finished dinner. No complaints. Ready for discharge.

## 2022-10-09 NOTE — DISCHARGE INSTRUCTIONS
Notify your primary care provider if you experience chest pain, difficulty breathing, fevers/chills, nausea or vomiting, bleeding in stool, excessive diarrhea, numbness or weakness or tingling in any part of your body.

## 2022-10-09 NOTE — DISCHARGE SUMMARY
Patient Name: Jaylyn Fuentes  : 1950  MRN: [id number]    Date of Admission: 10/6/2022  Date of Discharge:  10/9/2022  Primary Care Physician: Annie Romero MD      Chief Complaint:   Abdominal Pain, Vomiting, Nausea, and Diarrhea      Discharge Diagnoses     Active Hospital Problems    Diagnosis  POA   • **Colitis [K52.9]  Yes   • Abdominal pain [R10.9]  Yes   • Hypokalemia [E87.6]  Yes   • Chronic pancreatitis (HCC) [K86.1]  Yes   • Hyperglycemia [R73.9]  Yes   • N&V (nausea and vomiting) [R11.2]  Yes   • Essential hypertension [I10]  Yes      Resolved Hospital Problems   No resolved problems to display.        Hospital Course     Patient is a 72 y.o.  female with a past medical history of hypertension, stage 3 CKD, and irritable bowel syndrome, presented to the ED with nausea vomiting abdominal pain.  Patient has history of mild sigmoid inflammation seen on colonoscopy in  and mild erosion on EGD in .  CT of abdomen pelvis from admission showed possible thickening of the colon. GI was consulted.  Patient underwent EGD and colonoscopy on 10/09/2022.  Colonoscopy showed diverticulosis in the sigmoid colon and in the descending colon, Non-bleeding internal hemorrhoids, One 6 mm polyp in the descending colon, removed with a cold snare,Resected and retrieved.  EGD showed findings of acute gastritis. Biopsied.  Patient symptomatically improved.  Nausea and vomiting resolved.  Patient was initiated on regular diet prior to discharge, tolerated without complaints.  Patient was okay to be discharged from GI standpoint.  Discharged home on pantoprazole 40 mg twice daily for gastritis.  Follow-up with outpatient GI in 2 weeks.    Notify your primary care provider if you experience chest pain, difficulty breathing, fevers/chills, nausea or vomiting, bleeding in stool, excessive diarrhea, numbness or weakness or tingling in any part of your body.        Day of Discharge      Subjective:  Patient seen this afternoon.  Status post colonoscopy and EGD this morning.  Doing well.  Denies abdominal pain, just feels discomfort from colonoscopy.  No fevers no chills, no chest pain or shortness of breath.  Was initiated on regular diet, tolerated.  Also be discharged home.    Physical Exam:  Temp:  [97.6 °F (36.4 °C)-98 °F (36.7 °C)] 98 °F (36.7 °C)  Heart Rate:  [53-96] 63  Resp:  [13-18] 13  BP: (105-192)/(82-93) 168/91  Body mass index is 35.23 kg/m².  Physical Exam  General.    Alert and oriented x3.,  Not in distress.   Cardiovascular.  Regular rate and rhythm with no gallops or murmurs.  Chest.  Clear to auscultation bilaterally with scattered bilateral rhonchi  Abdomen.  Obese.  Soft lax.  No tenderness.  No organomegaly.  No guarding or rebound.  Extremities.  No significant peripheral edema, no cyanosis  Consultants     Consult Orders (all) (From admission, onward)     Start     Ordered    10/07/22 1413  Inpatient Hospitalist Consult  Once        Specialty:  Hospitalist  Provider:  Noris Arriola MD    10/07/22 1412    10/06/22 1710  Inpatient Gastroenterology Consult  Once        Specialty:  Gastroenterology  Provider:  Brandee Lucas MD    10/06/22 1710              Procedures     ESOPHAGOGASTRODUODENOSCOPY with biopsies, COLONOSCOPY to TI with polypectomy (cold snare)      Imaging Results (All)     Procedure Component Value Units Date/Time    CT Abdomen Pelvis With Contrast [[id number]] Collected: 10/06/22 1638     Updated: 10/06/22 1646    Narrative:      CT ABDOMEN PELVIS W CONTRAST-     INDICATIONS: Pain     TECHNIQUE: Radiation dose reduction techniques were utilized, including  automated exposure control and exposure modulation based on body size.  Enhanced ABDOMEN AND PELVIS CT     COMPARISON: 9/15/2021     FINDINGS:     The gallbladder is surgically absent, with mild intrahepatic and  extrahepatic biliary ductal dilatation. Likely focal fat is seen  anteriorly  at the left hepatic lobe..     No hydronephrosis is noted. Portions of the distal ureters and urinary  bladder are obscured by bilateral hip arthroplasty hardware artifact.     Otherwise unremarkable appearance of the liver, spleen, adrenal glands,  pancreas, kidneys, bladder.     No bowel obstruction. Appearance of mild wall thickening predominantly  the transverse and descending colon is likely at least in part from lack  of distention, but could be evidence of mild nonspecific colitis.  Minimal hiatal hernia is apparent. Colonic diverticula are seen that do  not appear inflamed.     No free intraperitoneal gas or free fluid.     Scattered small mesenteric and para-aortic lymph nodes are seen that are  not significant by size criteria.     Abdominal aorta is not aneurysmal. Aortic and other arterial  calcifications are present.     The lung bases show mild atelectasis.     Degenerative and surgical changes are seen in the spine. No acute  fracture is identified.             Impression:            1. Appearance of mild wall thickening predominantly the transverse and  descending colon is likely at least in part from lack of distention, but  could be evidence of mild nonspecific colitis. Colonic diverticulosis.  2. No obstructive uropathy.     This report was finalized on 10/6/2022 4:43 PM by Dr. Serg Figueroa M.D.           Results for orders placed during the hospital encounter of 02/20/17    Duplex venous upper extremity left CAR    Interpretation Summary  · Acute left upper extremity superficial thrombophlebitis noted in the cephalic (upper arm).  · All other left sided vessels appear normal.      Pertinent Labs     Results from last 7 days   Lab Units 10/09/22  0811 10/07/22  0355 10/06/22  1314 10/03/22  0953   WBC 10*3/mm3 6.71 6.81 7.02 5.95   HEMOGLOBIN g/dL 13.1 12.1 13.7 12.4   PLATELETS 10*3/mm3 340 280 339 293     Results from last 7 days   Lab Units 10/09/22  0811 10/08/22  1959 10/07/22  1408  10/07/22  0355 10/06/22  1314   SODIUM mmol/L 144  --  140 143 141   POTASSIUM mmol/L 4.1 4.1 2.9* 2.8* 3.2*   CHLORIDE mmol/L 108*  --  101 103 98   CO2 mmol/L 25.3  --  28.3 28.8 29.9*   BUN mg/dL 7*  --  12 12 13   CREATININE mg/dL 0.96  --  0.91 0.92 0.90   GLUCOSE mg/dL 87  --  126* 96 106*   Estimated Creatinine Clearance: 55.6 mL/min (by C-G formula based on SCr of 0.96 mg/dL).  Results from last 7 days   Lab Units 10/09/22  0811 10/07/22  0355 10/06/22  1314 10/03/22  0953   ALBUMIN g/dL 4.00 4.20 4.40 4.20   BILIRUBIN mg/dL 0.6 0.6 0.6 0.5   ALK PHOS U/L 77 72 79 83   AST (SGOT) U/L 20 20 18 18   ALT (SGPT) U/L 19 13 12 9     Results from last 7 days   Lab Units 10/09/22  0811 10/07/22  1408 10/07/22  0355 10/06/22  1314 10/03/22  0953   CALCIUM mg/dL 9.1 8.8 8.6 9.2 8.9   ALBUMIN g/dL 4.00  --  4.20 4.40 4.20   MAGNESIUM mg/dL  --   --  2.0  --   --      Results from last 7 days   Lab Units 10/06/22  1314   LIPASE U/L 36             Invalid input(s): LDLCALC  Results from last 7 days   Lab Units 10/07/22  0355   BLOODCX  No growth at 2 days     Results from last 7 days   Lab Units 10/06/22  1755   COVID19  Not Detected       Test Results Pending at Discharge     Pending Labs     Order Current Status    Tissue Pathology Exam Collected (10/09/22 0933)    Celiac Comprehensive Panel In process    Tissue Transglutaminase, IgA In process    Blood Culture - Blood, Arm, Right Preliminary result          Discharge Details        Discharge Medications      New Medications      Instructions Start Date   pantoprazole 40 MG EC tablet  Commonly known as: PROTONIX  Replaces: Dexilant 60 MG capsule   40 mg, Oral, 2 Times Daily Before Meals         Continue These Medications      Instructions Start Date   cetirizine 10 MG tablet  Commonly known as: zyrTEC   10 mg, Oral, Daily PRN      diphenoxylate-atropine 2.5-0.025 MG per tablet  Commonly known as: LOMOTIL   1 tablet, Oral, 4 Times Daily PRN      fesoterodine fumarate 8  "MG tablet sustained-release 24 hour tablet  Commonly known as: TOVIAZ ER   8 mg, Oral, Every 24 Hours Scheduled      HYDROcodone-acetaminophen 5-325 MG per tablet  Commonly known as: NORCO   1 tablet, Oral, Every 4 Hours PRN      lisinopril 20 MG tablet  Commonly known as: PRINIVIL,ZESTRIL   20 mg, Oral, 2 Times Daily      OLANZapine 2.5 MG tablet  Commonly known as: zyPREXA   2.5 mg, Oral, Nightly      ondansetron 8 MG tablet  Commonly known as: ZOFRAN   8 mg, Oral, Every 8 Hours PRN      pregabalin 150 MG capsule  Commonly known as: LYRICA   300 mg, Oral, Every Evening      pregabalin 150 MG capsule  Commonly known as: LYRICA   150 mg, Oral, Every Morning      Riboflavin 100 MG tablet   100 mg, Oral, Daily      vilazodone 40 MG tablet tablet  Commonly known as: VIIBRYD   40 mg, Oral, Daily         Stop These Medications    Dexilant 60 MG capsule  Generic drug: dexlansoprazole  Replaced by: pantoprazole 40 MG EC tablet            Allergies   Allergen Reactions   • Cephalexin GI Intolerance, Diarrhea, Itching and Nausea And Vomiting   • Sulfa Antibiotics Rash and Other (See Comments)     SKIN BRIGHT RED     SKIN BRIGHT RED    • Baclofen Other (See Comments) and Hallucinations     \"makes me crazy\"  \"I go crazy\"     • Melatonin Confusion   • Tramadol Anxiety, Other (See Comments) and Confusion     Per pt's MD  Unable to speak/ \"out of it\"        Discharge Disposition:  Home or Self Care      Discharge Diet:  Diet Order   Procedures   • Diet Regular; Cardiac       Discharge Activity:   Activity Instructions    As tolerated            CODE STATUS:    Code Status and Medical Interventions:   Ordered at: 10/06/22 3510     Level Of Support Discussed With:    Patient     Code Status (Patient has no pulse and is not breathing):    CPR (Attempt to Resuscitate)     Medical Interventions (Patient has pulse or is breathing):    Full Support       Future Appointments   Date Time Provider Department Center   3/9/2023  1:00 PM " Trevor Mast MD [facility]      Follow-up Information     You Sheth MD Follow up in 1 week(s).    Specialty: Gastroenterology  Why: make appointment for colonoscopy  Contact information:  [address]  [address]  [facility] [id number] [phone]             Kenneth Sanches MD Follow up in 2 week(s).    Specialty: Gastroenterology  Contact information:  [address] [facility]  [address]  [facility] [id number] [phone]             Annie Rmoero MD .    Specialty: Internal Medicine  Contact information:  [address]  [address]  [facility] [id number] [phone]                         Time Spent on Discharge:  Greater than 30 minutes      Mateusz Elias MD  [facility]  10/09/22  19:39 EDT

## 2022-10-09 NOTE — ANESTHESIA POSTPROCEDURE EVALUATION
"Patient: Jaylyn Fuentes    Procedure Summary     Date: 10/09/22 Room / Location: [facility] ENDOSCOPY 1 /  [facility]    Anesthesia Start: 0924 Anesthesia Stop: 0949    Procedures:       ESOPHAGOGASTRODUODENOSCOPY with biopsies (Esophagus)      COLONOSCOPY to TI with polypectomy (cold snare) Diagnosis:     Surgeons: Kenneth Sanches MD Provider: Serafin Santacruz MD    Anesthesia Type: MAC ASA Status: 3          Anesthesia Type: MAC    Vitals  Vitals Value Taken Time   /91 10/09/22 1007   Temp     Pulse 63 10/09/22 1007   Resp 13 10/09/22 1007   SpO2 100 % 10/09/22 1007           Post Anesthesia Care and Evaluation    Patient location during evaluation: bedside  Patient participation: complete - patient participated  Level of consciousness: sleepy but conscious  Pain score: 0  Pain management: adequate    Airway patency: patent  Anesthetic complications: No anesthetic complications    Cardiovascular status: acceptable  Respiratory status: acceptable  Hydration status: acceptable    Comments: /91 (BP Location: Left arm, Patient Position: Lying)   Pulse 63   Temp 36.7 °C (98 °F) (Oral)   Resp 13   Ht 160 cm (63\")   Wt 90.2 kg (198 lb 14.4 oz)   SpO2 100%   BMI 35.23 kg/m²         "

## 2022-10-10 NOTE — CASE MANAGEMENT/SOCIAL WORK
Case Management Discharge Note      Final Note: DC'd home 10/9    Provided Post Acute Provider List?: N/A  Provided Post Acute Provider Quality & Resource List?: N/A             Transportation Services  Private: Car    Final Discharge Disposition Code: 01 - home or self-care

## 2022-10-11 LAB
ENDOMYSIUM IGA SER QL: NEGATIVE
GLIADIN PEPTIDE IGA SER-ACNC: 4 UNITS (ref 0–19)
GLIADIN PEPTIDE IGG SER-ACNC: 2 UNITS (ref 0–19)
IGA SERPL-MCNC: 230 MG/DL (ref 64–422)
LAB AP CASE REPORT: NORMAL
PATH REPORT.FINAL DX SPEC: NORMAL
PATH REPORT.GROSS SPEC: NORMAL
TTG IGA SER-ACNC: <2 U/ML (ref 0–3)
TTG IGA SER-ACNC: <2 U/ML (ref 0–3)
TTG IGG SER-ACNC: <2 U/ML (ref 0–5)

## 2022-10-12 LAB — BACTERIA SPEC AEROBE CULT: NORMAL

## 2022-10-14 ENCOUNTER — OFFICE VISIT (OUTPATIENT)
Dept: GASTROENTEROLOGY | Facility: CLINIC | Age: 72
End: 2022-10-14

## 2022-10-14 VITALS
BODY MASS INDEX: 34.94 KG/M2 | HEIGHT: 63 IN | TEMPERATURE: 96 F | OXYGEN SATURATION: 95 % | WEIGHT: 197.2 LBS | HEART RATE: 91 BPM

## 2022-10-14 DIAGNOSIS — R19.7 DIARRHEA, UNSPECIFIED TYPE: Primary | ICD-10-CM

## 2022-10-14 DIAGNOSIS — R10.9 LEFT SIDED ABDOMINAL PAIN: ICD-10-CM

## 2022-10-14 DIAGNOSIS — R93.3 ABNORMAL CT SCAN, GASTROINTESTINAL TRACT: ICD-10-CM

## 2022-10-14 PROCEDURE — 99215 OFFICE O/P EST HI 40 MIN: CPT | Performed by: NURSE PRACTITIONER

## 2022-10-14 RX ORDER — AMOXICILLIN AND CLAVULANATE POTASSIUM 875; 125 MG/1; MG/1
TABLET, FILM COATED ORAL
COMMUNITY
Start: 2022-10-07

## 2022-10-14 RX ORDER — PANTOPRAZOLE SODIUM 40 MG/1
40 TABLET, DELAYED RELEASE ORAL
COMMUNITY
Start: 2022-10-09 | End: 2022-10-14 | Stop reason: SDUPTHER

## 2022-10-14 RX ORDER — DICYCLOMINE HYDROCHLORIDE 10 MG/1
10 CAPSULE ORAL 3 TIMES DAILY PRN
Qty: 60 CAPSULE | Refills: 3 | Status: SHIPPED | OUTPATIENT
Start: 2022-10-14

## 2022-10-14 NOTE — PROGRESS NOTES
Chief Complaint   Patient presents with   • Abdominal Pain       HPI    Jaylyn Fuentes is a  72 y.o. female here for a follow up visit for colitis hospital follow-up.    Patient seen by Dr. Sanches during hospital stay, she is new to me.    She presented to [facility] earlier this month we were asked to see the patient secondary to findings of colitis on CT.  Complained of abdominal pain with nausea and vomiting and diarrhea.  CT revealed mild wall thickening predominantly in the transverse and descending colon and diverticulosis.    She underwent bidirectional endoscopic examination personally reviewed:    EGD w/ acute gastritis otherwise normal.   C-scope w/ Diverticulosis, nonbleeding internal hemorrhoids, TA polyp in the descending colon.  Symptoms improved and she was discharged on twice daily dosing Protonix.    On visit today she reports no change in symptoms since hospital discharge.  She still complains of left-sided abdominal pain with diarrhea even nocturnal symptoms of diarrhea.  She was given a prescription for Augmentin upon hospital discharge for unclear reasons has only taken 1 or 2 tablets.  Pain described as aching sensation that comes and goes.  No rectal bleeding.    Recent lab work with normal hemogram and normal LFTs.    Follow-up CT performed at [facility] ordered by her primary care provider again with mild wall thickening of the ascending colon ?  Colitis  Past Medical History:   Diagnosis Date   • Abdominal pain    • Abnormal CT of the abdomen    • Abnormal glucose    • Abnormal liver enzymes    • Abnormal urinalysis    • Achilles tendinitis     left   • Acid reflux disease    • Acromioclavicular joint arthritis    • Acromioclavicular joint pain, bilateral    • Aftercare following knee joint replacement surgery    • Alcohol abuse    • Alternating constipation and diarrhea    • Anemia    • Arthralgia of multiple sites    • Arthritis    • BMI 34.0-34.9,adult    • Carpal tunnel syndrome    • Cataract     • Cellulitis of extremity    • Chronic insomnia    • Chronic kidney disease, stage III (moderate) ([facility])    • Contact dermatitis    • Contusion of bone    • Cough    • Decreased range of motion    • Degeneration of cervical intervertebral disc    • Depressed    • Dermatitis    • Diarrhea    • Dry eye    • Dysphagia    • Edema of extremities    • Esophagitis    • Fatigue    • Flu vaccine need    • GERD (gastroesophageal reflux disease)    • Glenohumeral arthritis, left    • Hemorrhoids    • Hiatal hernia    • Hip pain    • History of colonic polyps    • History of DVT in adulthood    • History of transfusion    • Hyperactivity of bladder    • Hyperlipidemia    • Hypertension    • Hypokalemia    • Irritable bowel syndrome with diarrhea    • Lactose intolerance    • Left ankle pain    • Leg cramps    • Leg swelling    • Limitation of joint motion of ankle, left    • Lower extremity tendinopathy    • Medicare annual wellness visit, subsequent    • Methicillin resistant Staphylococcus aureus infection    • Microscopic hematuria    • Migraine    • Obstructive sleep apnea    • Osteoarthritis, chronic    • Pain, foot, left, chronic    • Pancreatitis, chronic ([facility])    • Pes planus    • PONV (postoperative nausea and vomiting)     SCOPOLAMINE HELPS-USED DURING LAST SURGERY    • Rectal leakage    • Retrocalcaneal bursitis    • Right ankle instability    • Right ankle pain    • Right ankle sprain    • Right ankle swelling    • Rotator cuff tear, left    • S/P shoulder surgery    • Shoulder region pain    • Spinal stenosis    • Swallowing difficulty    • Urge incontinence of urine    • Urinary incontinence    • Visit for screening mammogram    • Wears contact lenses    • Wears dentures     UPPER AND LOWER    • Wears hearing aid     DOESN'T HAVE WITH HER   • Weight gain        Past Surgical History:   Procedure Laterality Date   • BACK SURGERY      LUMBAR FUSION    • CERVICAL FUSION      plates, rods, and screws   •  "CHOLECYSTECTOMY     • COLONOSCOPY      2 YEARS AGO    • COLONOSCOPY N/A 10/9/2022    Procedure: COLONOSCOPY to TI with polypectomy (cold snare);  Surgeon: Kenneth Sanches MD;  Location: [facility] ENDOSCOPY;  Service: Gastroenterology;  Laterality: N/A;  pre- abdominal pain, nausea and vomiting  post- polyp, hemorrhoids   • ENDOSCOPY N/A 10/14/2021    Procedure: ESOPHAGOGASTRODUODENOSCOPY with biopsy and polypectomy;  Surgeon: Hussain Basilio MD;  Location: [facility] OR;  Service: Gastroenterology;  Laterality: N/A;  gastritis and polyps   • ENDOSCOPY N/A 10/9/2022    Procedure: ESOPHAGOGASTRODUODENOSCOPY with biopsies;  Surgeon: Kenneth Sanches MD;  Location: [facility] ENDOSCOPY;  Service: Gastroenterology;  Laterality: N/A;  pre- abdominal pain, nausea vomiting  post-- gastritis   • EYE SURGERY      LASER FOR KERITONOSIS    • GALLBLADDER SURGERY     • HERNIA REPAIR     • HYSTERECTOMY     • INGUINAL HERNIA REPAIR     • JOINT REPLACEMENT      BILATERAL KNEES, BILATERAL HIPS   • NECK SURGERY     • [place] RECONSTR TOTAL SHOULDER IMPLANT Left 2/13/2017    Procedure: TOTAL SHOULDER REPLACEMENT LEFT;  Surgeon: Benigno Chavez MD;  Location: [facility];  Service: Orthopedics   • REPLACEMENT TOTAL KNEE     • SHOULDER LIGAMENT REPAIR Right    • TONSILLECTOMY     • TOTAL HIP ARTHROPLASTY     • UPPER GASTROINTESTINAL ENDOSCOPY         Scheduled Meds:     Continuous Infusions: No current facility-administered medications for this visit.      PRN Meds:     Allergies   Allergen Reactions   • Cephalexin GI Intolerance, Diarrhea, Itching and Nausea And Vomiting   • Sulfa Antibiotics Rash and Other (See Comments)     SKIN BRIGHT RED     SKIN BRIGHT RED    • Baclofen Other (See Comments) and Hallucinations     \"makes me crazy\"  \"I go crazy\"     • Melatonin Confusion   • Tramadol Anxiety, Other (See Comments) and Confusion     Per pt's MD  Unable to speak/ \"out of it\"        Social History     Socioeconomic History   • Marital " status: Single   Tobacco Use   • Smoking status: Former   • Smokeless tobacco: Never   Vaping Use   • Vaping Use: Never used   Substance and Sexual Activity   • Alcohol use: Yes     Comment: [employer] SOCIALLY    • Drug use: No   • Sexual activity: Defer       Family History   Problem Relation Age of Onset   • Arthritis Other    • Colonic polyp Other    • Coronary artery disease Other        Review of Systems   Constitutional: Negative for activity change, appetite change, fatigue, fever and unexpected weight change.   HENT: Negative for trouble swallowing.    Respiratory: Negative for apnea, cough, choking, chest tightness, shortness of breath and wheezing.    Cardiovascular: Negative for chest pain, palpitations and leg swelling.   Gastrointestinal: Positive for abdominal pain and diarrhea. Negative for abdominal distention, anal bleeding, blood in stool, constipation, nausea, rectal pain and vomiting.       Vitals:    10/14/22 1257   Pulse: 91   Temp: 96 °F (35.6 °C)   SpO2: 95%       Physical Exam  Constitutional:       Appearance: She is well-developed.   Abdominal:      General: Bowel sounds are normal. There is no distension.      Palpations: Abdomen is soft. There is no mass.      Tenderness: There is abdominal tenderness. There is no guarding.      Hernia: No hernia is present.   Skin:     General: Skin is warm and dry.      Capillary Refill: Capillary refill takes less than 2 seconds.   Neurological:      Mental Status: She is alert and oriented to person, place, and time.   Psychiatric:         Behavior: Behavior normal.     Assessment    Diagnoses and all orders for this visit:    1. Diarrhea, unspecified type (Primary)  -     Clostridioides difficile Toxin, PCR - Stool, Per Rectum  -     Calprotectin, Fecal - Stool, Per Rectum  -     Pancreatic Elastase, Fecal - Stool, Per Rectum  -     Stool Culture (Reference Lab) - Stool, Per Rectum    2. Left sided abdominal pain    3. Abnormal CT scan,  gastrointestinal tract    Other orders  -     dicyclomine (Bentyl) 10 MG capsule; Take 1 capsule by mouth 3 (Three) Times a Day As Needed (Abdominal pain and diarrhea).  Dispense: 60 capsule; Refill: 3      Plan    Jaylyn is not feeling much better after recent hospital discharge.  Reviewed discharge summary as well as endoscopic examination and pathology.  Follow-up CT still with evidence of possible colitis.  Mainly left-sided abdominal pain and diarrhea.  Took 1 or 2 tablets of Augmentin.  Recommend follow-up stool testing as above to rule out infectious colitis and assess for colonic inflammation.  Trial of antispasmodic Bentyl in the interim.  [place] diet for now.  Push fluids.  If symptoms worsen over the weekend return to [facility] ER.  Further recommendations pending stool testing results.         GRACIE Morley  [facility]  [address] [place] [address]  Office: [phone]    I spent 40 minutes caring for Pat on this date of service. This time includes time spent by me in the following activities: preparing for the visit, reviewing tests, obtaining and/or reviewing a separately obtained history, performing a medically appropriate examination and/or evaluation , counseling and educating the patient/family/caregiver, ordering medications, tests, or procedures, documenting information in the medical record, independently interpreting results and communicating that information with the patient/family/caregiver and care coordination.

## 2022-10-19 LAB
C DIFF TOX GENS STL QL NAA+PROBE: NEGATIVE
SPECIMEN STATUS: NORMAL

## 2022-10-20 ENCOUNTER — TELEPHONE (OUTPATIENT)
Dept: GASTROENTEROLOGY | Facility: CLINIC | Age: 72
End: 2022-10-20

## 2022-10-20 NOTE — TELEPHONE ENCOUNTER
So far the C. difficile stool test is negative. Can we check with the lab to see if they are processing the fecal calprotectin and pancreatic elastase as well as stool culture?

## 2022-10-20 NOTE — TELEPHONE ENCOUNTER
Caller: Jaylyn Fuentes    Relationship to patient: Self    Best call back number: [phone]    Patient is needing: PT HAS HAD MAJOR DIARRHEA FOR ONE MONTH AND NEED TO KNOW WHAT TO DO AND GET THE RESULTS OF THE STOOL SAME THAT WAS DROPPED OFF 10/18.

## 2022-10-20 NOTE — TELEPHONE ENCOUNTER
"Returned call    Pt is still having diarrhea, wanted results of stool studies, which are not back yet.  Her POA stated she is eating very little and when she does she has diarrhea.  She said yesterday she had \"major diarrhea\" twice that came with some urgency.  Today she has only gone once.  POA gave the pt 1 imodium per your recommendations, however this is the first time she had given the imodium.      They are asking if there is anything that can be done?    Pt is taking the dicyclomine TID as ordered.    Please let me know how you would like to proceed  Thanks    "

## 2022-10-23 LAB — CALPROTECTIN STL-MCNT: 49 UG/G (ref 0–120)

## 2022-10-25 LAB — ELASTASE PANC STL-MCNT: 399 UG ELAST./G

## 2022-11-09 ENCOUNTER — TELEPHONE (OUTPATIENT)
Dept: GASTROENTEROLOGY | Facility: CLINIC | Age: 72
End: 2022-11-09

## 2022-11-09 NOTE — TELEPHONE ENCOUNTER
Called pt and left detailed msg on identified vm advising of Dr Sanches and Shanna ZIMMERMAN's notes.  Advised to call back if any questions.

## 2022-11-09 NOTE — TELEPHONE ENCOUNTER
----- Message from GRACIE Morley sent at 10/26/2022  4:07 PM EDT -----  Please inform Jaylyn that initial stool testing is normal stool culture still pending.

## 2022-11-09 NOTE — TELEPHONE ENCOUNTER
----- Message from Kenneth Sanches MD sent at 10/11/2022 12:02 PM EDT -----  Tubular adenoma colon polyp  Continue all medication  Follow-up with PCP

## 2023-03-14 NOTE — ED TRIAGE NOTES
Patient arrived to ED via EMS called to scene by patient's family due Cough, confusion.  A&O x4 according to EMS and stroke assessment negative. Having difficulty finding words. However may be due to tramadol, aspirin, oxycodone post surgery. Pt got home yesterday. Has not been eating, but taking all meds. Surgery was for new pin placement in L hip.    14-Mar-2023 18:22

## 2025-03-24 ENCOUNTER — TRANSCRIBE ORDERS (OUTPATIENT)
Dept: ADMINISTRATIVE | Facility: HOSPITAL | Age: 75
End: 2025-03-24
Payer: MEDICARE

## 2025-03-24 DIAGNOSIS — Z12.31 BREAST CANCER SCREENING BY MAMMOGRAM: Primary | ICD-10-CM

## 2025-05-12 ENCOUNTER — HOSPITAL ENCOUNTER (OUTPATIENT)
Dept: MAMMOGRAPHY | Facility: HOSPITAL | Age: 75
Discharge: HOME | End: 2025-05-12
Payer: MEDICARE

## (undated) DEVICE — KT ORCA ORCAPOD DISP STRL

## (undated) DEVICE — AIRWY 90MM NO9

## (undated) DEVICE — SKIN AFFIX SURG ADHESIVE 72/CS 0.55ML: Brand: MEDLINE

## (undated) DEVICE — GOWN ,SIRUS,NONREINFORCED 3XL: Brand: MEDLINE

## (undated) DEVICE — RECIPROCATING BLADE, DOUBLE SIDED, OFFSET  (70.0 X 0.8 X 12.5MM)

## (undated) DEVICE — VIAL FORMLN CAP 10PCT 20ML

## (undated) DEVICE — GLV SURG SIGNATURE TOUCH PF LTX 8 STRL BX/50

## (undated) DEVICE — 3 BONE CEMENT MIXER: Brand: MIXEVAC

## (undated) DEVICE — 2000CC GUARDIAN II: Brand: GUARDIAN

## (undated) DEVICE — SST TWIST DRILL, STANDARD, 2.4MM DIA. X 127MM: Brand: MICROAIRE®

## (undated) DEVICE — ADAPT CLN BIOGUARD AIR/H2O DISP

## (undated) DEVICE — CANN O2 ETCO2 FITS ALL CONN CO2 SMPL A/ 7IN DISP LF

## (undated) DEVICE — FRCP BX RADJAW4 NDL 2.8 240CM LG OG BX40

## (undated) DEVICE — THE SINGLE USE ETRAP – POLYP TRAP IS USED FOR SUCTION RETRIEVAL OF ENDOSCOPICALLY REMOVED POLYPS.: Brand: ETRAP

## (undated) DEVICE — NERVE BLOCK SUPPORT KIT/BLUE: Brand: MEDLINE INDUSTRIES, INC.

## (undated) DEVICE — SNAR POLYP CAPTIVATOR RND STFF 2.4 240CM 10MM 1P/U

## (undated) DEVICE — KT PUMP PAIN ONQ CBLOC SELCTAFLO 400ML

## (undated) DEVICE — CANNULA,ADULT,SOFT-TOUCH,7TUBE,SC: Brand: MEDLINE

## (undated) DEVICE — SINGLE-USE BIOPSY FORCEPS: Brand: RADIAL JAW 4

## (undated) DEVICE — PK MAJ SHLDR SPLT 10

## (undated) DEVICE — ST NERV BLCK CONT CONTIPLEX ECHO CLSD 18G 2IN

## (undated) DEVICE — PAD ARMBRD SURG CONVOL 7.5X20X2IN

## (undated) DEVICE — SOL LR 1000ML

## (undated) DEVICE — TUBING, SUCTION, 1/4" X 10', STRAIGHT: Brand: MEDLINE

## (undated) DEVICE — ADAPT ST INFUS ADMIN SYR 70IN

## (undated) DEVICE — SUT MONOCRYL PLS ANTIB UND 3/0  PS1 27IN

## (undated) DEVICE — DISH PETRI 3.5IN MD STRL LF

## (undated) DEVICE — PAD GRND REM POLYHESIVE A/ DISP

## (undated) DEVICE — INTENDED USE FOR SURGICAL MARKING ON INTACT SKIN, ALSO PROVIDES A PERMANENT METHOD OF IDENTIFYING OBJECTS IN THE OPERATING ROOM: Brand: WRITESITE® REGULAR TIP SKIN MARKER

## (undated) DEVICE — DRAPE,SHOULDER,BEACH CHAIR,STERILE: Brand: MEDLINE

## (undated) DEVICE — BITEBLOCK OMNI BLOC

## (undated) DEVICE — MSK ENDO PORT O2 POM ELITE CURAPLEX A/

## (undated) DEVICE — ANTIBACTERIAL UNDYED BRAIDED (POLYGLACTIN 910), SYNTHETIC ABSORBABLE SUTURE: Brand: COATED VICRYL

## (undated) DEVICE — BNDG ELAS CO-FLEX SLF ADHR 4IN5YD LF STRL

## (undated) DEVICE — 3M™ WARMING BLANKET, LOWER BODY, 10 PER CASE, 42568: Brand: BAIR HUGGER™

## (undated) DEVICE — SENSR O2 OXIMAX FNGR A/ 18IN NONSTR

## (undated) DEVICE — Device

## (undated) DEVICE — LN SMPL CO2 SHTRM SD STREAM W/M LUER

## (undated) DEVICE — T4 PULLOVER TOGA, LARGE

## (undated) DEVICE — HOLDER: Brand: DEROYAL